# Patient Record
Sex: MALE | Race: BLACK OR AFRICAN AMERICAN | NOT HISPANIC OR LATINO | Employment: FULL TIME | URBAN - METROPOLITAN AREA
[De-identification: names, ages, dates, MRNs, and addresses within clinical notes are randomized per-mention and may not be internally consistent; named-entity substitution may affect disease eponyms.]

---

## 2018-03-26 ENCOUNTER — OFFICE VISIT (OUTPATIENT)
Dept: INTERNAL MEDICINE | Facility: CLINIC | Age: 48
End: 2018-03-26
Payer: COMMERCIAL

## 2018-03-26 VITALS
BODY MASS INDEX: 29.91 KG/M2 | TEMPERATURE: 99.1 F | SYSTOLIC BLOOD PRESSURE: 113 MMHG | WEIGHT: 168.8 LBS | OXYGEN SATURATION: 98 % | DIASTOLIC BLOOD PRESSURE: 77 MMHG | HEIGHT: 63 IN | HEART RATE: 91 BPM

## 2018-03-26 DIAGNOSIS — H53.8 BLURRED VISION: ICD-10-CM

## 2018-03-26 DIAGNOSIS — R35.0 URINARY FREQUENCY: Primary | ICD-10-CM

## 2018-03-26 DIAGNOSIS — K59.00 CONSTIPATION, UNSPECIFIED CONSTIPATION TYPE: ICD-10-CM

## 2018-03-26 DIAGNOSIS — R06.81 APNEA, TRANSIENT: ICD-10-CM

## 2018-03-26 DIAGNOSIS — Z13.1 SCREENING FOR DIABETES MELLITUS: ICD-10-CM

## 2018-03-26 DIAGNOSIS — R06.83 SNORING: ICD-10-CM

## 2018-03-26 LAB
ALBUMIN UR-MCNC: NEGATIVE MG/DL
APPEARANCE UR: CLEAR
BACTERIA #/AREA URNS HPF: ABNORMAL /HPF
BILIRUB UR QL STRIP: NEGATIVE
COLOR UR AUTO: YELLOW
GLUCOSE UR STRIP-MCNC: >=1000 MG/DL
HBA1C MFR BLD: >15 % (ref 4.3–6)
HGB UR QL STRIP: ABNORMAL
KETONES UR STRIP-MCNC: 15 MG/DL
LEUKOCYTE ESTERASE UR QL STRIP: NEGATIVE
NITRATE UR QL: NEGATIVE
PH UR STRIP: 5.5 PH (ref 5–7)
RBC #/AREA URNS AUTO: ABNORMAL /HPF
SOURCE: ABNORMAL
SP GR UR STRIP: 1.01 (ref 1–1.03)
UROBILINOGEN UR STRIP-ACNC: 0.2 EU/DL (ref 0.2–1)
WBC #/AREA URNS AUTO: ABNORMAL /HPF

## 2018-03-26 PROCEDURE — 84443 ASSAY THYROID STIM HORMONE: CPT | Performed by: INTERNAL MEDICINE

## 2018-03-26 PROCEDURE — G0103 PSA SCREENING: HCPCS | Performed by: INTERNAL MEDICINE

## 2018-03-26 PROCEDURE — 82043 UR ALBUMIN QUANTITATIVE: CPT | Performed by: INTERNAL MEDICINE

## 2018-03-26 PROCEDURE — 83036 HEMOGLOBIN GLYCOSYLATED A1C: CPT | Performed by: INTERNAL MEDICINE

## 2018-03-26 PROCEDURE — 81001 URINALYSIS AUTO W/SCOPE: CPT | Performed by: INTERNAL MEDICINE

## 2018-03-26 PROCEDURE — 36415 COLL VENOUS BLD VENIPUNCTURE: CPT | Performed by: INTERNAL MEDICINE

## 2018-03-26 PROCEDURE — 99204 OFFICE O/P NEW MOD 45 MIN: CPT | Performed by: INTERNAL MEDICINE

## 2018-03-26 PROCEDURE — 80053 COMPREHEN METABOLIC PANEL: CPT | Performed by: INTERNAL MEDICINE

## 2018-03-26 NOTE — PATIENT INSTRUCTIONS
Preventive Health Recommendations  Male Ages 40 to 49    Yearly exam:             See your health care provider every year in order to  o   Review health changes.   o   Discuss preventive care.    o   Review your medicines if your doctor has prescribed any.    You should be tested each year for STDs (sexually transmitted diseases) if you re at risk.     Have a cholesterol test every 5 years.     Have a colonoscopy (test for colon cancer) if someone in your family has had colon cancer or polyps before age 50.     After age 45, have a diabetes test (fasting glucose). If you are at risk for diabetes, you should have this test every 3 years.      Talk with your health care provider about whether or not a prostate cancer screening test (PSA) is right for you.    Shots: Get a flu shot each year. Get a tetanus shot every 10 years.     Nutrition:    Eat at least 5 servings of fruits and vegetables daily.     Eat whole-grain bread, whole-wheat pasta and brown rice instead of white grains and rice.     Talk to your provider about Calcium and Vitamin D.     Lifestyle    Exercise for at least 150 minutes a week (30 minutes a day, 5 days a week). This will help you control your weight and prevent disease.     Limit alcohol to one drink per day.     No smoking.     Wear sunscreen to prevent skin cancer.     See your dentist every six months for an exam and cleaning.

## 2018-03-26 NOTE — PROGRESS NOTES
SUBJECTIVE:   CC: Norma Arcos is an 47 year old male who presents for preventative health visit.     Healthy Habits:    Do you get at least three servings of calcium containing foods daily (dairy, green leafy vegetables, etc.)? yes    Amount of exercise or daily activities, outside of work: No.    Problems taking medications regularly No    Medication side effects: No    Have you had an eye exam in the past two years? yes    Do you see a dentist twice per year? no    Do you have sleep apnea, excessive snoring or daytime drowsiness?yes, sleep apnea.       Urinary frequency. He reports having to urinate every 30mins-1hr.      No fevers/no penile discharge/no dysuria.     Polyphagia, polydypsia, polyuria.       He has blurred vision. He has difficulty seeing and reading.     He works at a machine, . A lot of physical work.     Today's PHQ-2 Score:   PHQ-2 ( 1999 [employer]) 3/26/2018   Q1: Little interest or pleasure in doing things 0   Q2: Feeling down, depressed or hopeless 0   PHQ-2 Score 0       Abuse: Current or Past(Physical, Sexual or Emotional)- No  Do you feel safe in your environment - Yes    Social History   Substance Use Topics     Smoking status: Never Smoker     Smokeless tobacco: Never Used     Alcohol use Yes      Comment: Socially      If you drink alcohol do you typically have >3 drinks per day or >7 drinks per week? Yes - AUDIT SCORE:     No flowsheet data found.                      Last PSA: No results found for: PSA    Reviewed orders with patient. Reviewed health maintenance and updated orders accordingly - Yes  There is no problem list on file for this patient.    History reviewed. No pertinent surgical history.    Social History   Substance Use Topics     Smoking status: Never Smoker     Smokeless tobacco: Never Used     Alcohol use Yes      Comment: Socially     Family History   Problem Relation Age of Onset     Hypertension Mother      DIABETES Other            Reviewed and  "updated as needed this visit by clinical staff  Tobacco  Allergies  Med Hx  Surg Hx  Fam Hx  Soc Hx        Reviewed and updated as needed this visit by Provider            ROS:  C: +weight loss, fatigue  I: NEGATIVE for worrisome rashes, moles or lesions  E: +blurred vision  ENT: NEGATIVE for ear, mouth and throat problems  R: NEGATIVE for significant cough or SOB  CV: NEGATIVE for chest pain, palpitations or peripheral edema  GI: +constipation   male: +urinary frequency  M: NEGATIVE for significant arthralgias or myalgia  N: NEGATIVE for weakness, dizziness or paresthesias  P: NEGATIVE for changes in mood or affect    OBJECTIVE:   /77 (BP Location: Right arm, Patient Position: Sitting, Cuff Size: Adult Regular)  Pulse 91  Temp 99.1  F (37.3  C) (Oral)  Ht 5' 3.25\" (1.607 m)  Wt 168 lb 12.8 oz (76.6 kg)  SpO2 98%  BMI 29.67 kg/m2  EXAM:  GENERAL: healthy, alert and no distress  EYES: Eyes grossly normal to inspection, PERRL and conjunctivae and sclerae normal  HENT: ear canals and TM's normal, nose and mouth without ulcers or lesions  NECK: no adenopathy, no asymmetry, masses, or scars and thyroid normal to palpation  RESP: lungs clear to auscultation - no rales, rhonchi or wheezes  CV: regular rate and rhythm, normal S1 S2, no S3 or S4, no murmur, click or rub, no peripheral edema and peripheral pulses strong  ABDOMEN: soft, nontender, no hepatosplenomegaly, no masses and bowel sounds normal  MS: no gross musculoskeletal defects noted, no edema  SKIN: no suspicious lesions or rashes  NEURO: Normal strength and tone, mentation intact and speech normal  PSYCH: mentation appears normal, affect normal/bright    ASSESSMENT/PLAN:   (R35.0) Urinary frequency  (primary encounter diagnosis)  Comment:     Given his symptoms, and fam hx of DMII very suspicious of this.  Will screen A1c, kidneys, get UA.    Plan: Comprehensive metabolic panel, Albumin Random         Urine Quantitative with Creat Ratio, *UA " "reflex        to Microscopic and Culture ([place] and [facility] (except [place] and [place]), PSA,         screen, Urine Microscopic            (Z13.1) Screening for diabetes mellitus  Comment: Screen as above  Plan: Hemoglobin A1c, Albumin Random Urine         Quantitative with Creat Ratio        Update:  HgbA1c returned at >15%.  Will start patient on lantus 15 units with instructions to increase by 3 units every 4 days.  In addition, will start metformin.  I will also start asa 81mg.  Will order lipids to determine if statin needed or not.    (H53.8) Blurred vision  Comment: likely due to marked elevation of BS  Plan: OPHTHALMOLOGY ADULT REFERRAL, TSH with free T4         reflex            (K59.00) Constipation, unspecified constipation type  Comment: Likely gastoparesis from severe hyperglycemia  Plan: TSH with free T4 reflex            (R06.83) Snoring  Comment: Will refer for sleep study  Plan: SLEEP EVALUATION & MANAGEMENT REFERRAL - [facility] [place]          [phone] (Age 18 and up)            (R06.81) Apnea, transient  Comment: see above  Plan: SLEEP EVALUATION & MANAGEMENT REFERRAL - [facility] [place]          [phone] (Age 18 and up)              COUNSELING:  Reviewed preventive health counseling, as reflected in patient instructions       Regular exercise       Healthy diet/nutrition       Vision screening       reports that he has never smoked. He has never used smokeless tobacco.    Estimated body mass index is 29.67 kg/(m^2) as calculated from the following:    Height as of this encounter: 5' 3.25\" (1.607 m).    Weight as of this encounter: 168 lb 12.8 oz (76.6 kg).   Weight management plan: Discussed healthy diet and exercise guidelines and patient will follow up in 3 months in clinic to re-evaluate. Specific weight management program called low carb discussed and follow up in 3 months in clinic to " re-evaluate.    Counseling Resources:  ATP IV Guidelines  Pooled Cohorts Equation Calculator  FRAX Risk Assessment  ICSI Preventive Guidelines  Dietary Guidelines for Americans, 2010  USDA's MyPlate  ASA Prophylaxis  Lung CA Screening    Mckenzie Wood MD  [facility]

## 2018-03-26 NOTE — MR AVS SNAPSHOT
After Visit Summary   3/26/2018    Norma Arcos    MRN: [id number]           Patient Information     Date Of Birth          1970        Visit Information        Provider Department      3/26/2018 3:00 PM Mckenzie Wood MD; [facility]        Today's Diagnoses     Urinary frequency    -  1    Screening for diabetes mellitus        Blurred vision        Constipation, unspecified constipation type        Snoring        Apnea, transient          Care Instructions             Preventive Health Recommendations  Male Ages 40 to 49    Yearly exam:             See your health care provider every year in order to  o   Review health changes.   o   Discuss preventive care.    o   Review your medicines if your doctor has prescribed any.    You should be tested each year for STDs (sexually transmitted diseases) if you re at risk.     Have a cholesterol test every 5 years.     Have a colonoscopy (test for colon cancer) if someone in your family has had colon cancer or polyps before age 50.     After age 45, have a diabetes test (fasting glucose). If you are at risk for diabetes, you should have this test every 3 years.      Talk with your health care provider about whether or not a prostate cancer screening test (PSA) is right for you.    Shots: Get a flu shot each year. Get a tetanus shot every 10 years.     Nutrition:    Eat at least 5 servings of fruits and vegetables daily.     Eat whole-grain bread, whole-wheat pasta and brown rice instead of white grains and rice.     Talk to your provider about Calcium and Vitamin D.     Lifestyle    Exercise for at least 150 minutes a week (30 minutes a day, 5 days a week). This will help you control your weight and prevent disease.     Limit alcohol to one drink per day.     No smoking.     Wear sunscreen to prevent skin cancer.     See your dentist every six months for an exam and cleaning.              Follow-ups after your visit         Additional Services     OPHTHALMOLOGY ADULT REFERRAL       Your provider has referred you to: [place]: [facility] [place]  [phone]  [email or web address]    Please be aware that coverage of these services is subject to the terms and limitations of your health insurance plan.  Call member services at your health plan with any benefit or coverage questions.      Please bring the following with you to your appointment:    (1) Any X-Rays, CTs or MRIs which have been performed.  Contact the facility where they were done to arrange for  prior to your scheduled appointment.    (2) List of current medications  (3) This referral request   (4) Any documents/labs given to you for this referral            [place]ALUATION [place] MANAGEMENT [place] [place]  [phone] (Age 18 and up)       Please be aware that coverage of these services is subject to the terms and limitations of your health insurance plan.  Call member services at your health plan with any benefit or coverage questions.      Please bring the following to your appointment:    >>   List of current medications   >>   This referral request   >>   Any documents/labs given to you for this referral                      Future tests that were ordered for you today     Open Future Orders        Priority Expected Expires Ordered    SLEEP EVALUATION & MANAGEMENT REF[place] [place]  [phone] (Age 18 and up) Routine  3/26/2019 3/26/2018            Who to contact     If you have questions or need follow up information about today's clinic visit or your schedule please contact [facility] directly at [phone].  Normal or non-critical lab and imaging results will be communicated to you by MyChart, letter or phone within 4 business days after the clinic has received the results. If you do not hear from us within 7 days, please contact  "the clinic through Teramind or phone. If you have a critical or abnormal lab result, we will notify you by phone as soon as possible.  Submit refill requests through Teramind or call your pharmacy and they will forward the refill request to us. Please allow 3 business days for your refill to be completed.          Additional Information About Your Visit        TranZfinityhart Information     Teramind lets you send messages to your doctor, view your test results, renew your prescriptions, schedule appointments and more. To sign up, go to [email or web address] . Click on \"Log in\" on the left side of the screen, which will take you to the Welcome page. Then click on \"Sign up Now\" on the right side of the page.     You will be asked to enter the access code listed below, as well as some personal information. Please follow the directions to create your username and password.     Your access code is: [email or web address]  Expires: 2018  4:07 PM     Your access code will  in 90 days. If you need help or a new code, please call your [place] clinic or [phone].        Care EveryWhere ID     This is your Care EveryWhere ID. This could be used by other organizations to access your [place] medical records  [id number]        Your Vitals Were     Pulse Temperature Height Pulse Oximetry BMI (Body Mass Index)       91 99.1  F (37.3  C) (Oral) 5' 3.25\" (1.607 m) 98% 29.67 kg/m2        Blood Pressure from Last 3 Encounters:   18 113/77    Weight from Last 3 Encounters:   18 168 lb 12.8 oz (76.6 kg)              We Performed the Following     *UA reflex to Microscopic and Culture ([place] and [place] [facility] (except [place] and [place])     Albumin Random Urine Quantitative with Creat Ratio     Comprehensive metabolic panel     Hemoglobin A1c     OPHTHALMOLOGY ADULT REFERRAL     PSA, screen     TSH with free T4 reflex        Primary Care Provider    None Specified       No primary provider on file.        Equal Access " to Services     BRIDGETTE MACK : Malachi Duong, waalka luna, qajammie grewal. So [facility] [phone].    ATENCIÓN: Si habla español, tiene a mei disposición servicios gratuitos de asistencia lingüística. Llame al [phone].    We comply with applicable federal civil rights laws and [place] laws. We do not discriminate on the basis of race, color, national origin, age, disability, sex, sexual orientation, or gender identity.            Thank you!     Thank you for choosing [facility]  for your care. Our goal is always to provide you with excellent care. Hearing back from our patients is one way we can continue to improve our services. Please take a few minutes to complete the written survey that you may receive in the mail after your visit with us. Thank you!             Your Updated Medication List - Protect others around you: Learn how to safely use, store and throw away your medicines at www.disposemymeds.org.          This list is accurate as of 3/26/18  4:09 PM.  Always use your most recent med list.                   Brand Name Dispense Instructions for use Diagnosis    IBUPROFEN PO      Take 1 tablet by mouth daily as needed for moderate pain        TYLENOL PO      Take 1 tablet by mouth daily as needed for mild pain or fever

## 2018-03-26 NOTE — NURSING NOTE
"Chief Complaint   Patient presents with     Physical       Initial /77 (BP Location: Right arm, Patient Position: Sitting, Cuff Size: Adult Regular)  Pulse 91  Temp 99.1  F (37.3  C) (Oral)  Ht 5' 3.25\" (1.607 m)  Wt 168 lb 12.8 oz (76.6 kg)  SpO2 98%  BMI 29.67 kg/m2 Estimated body mass index is 29.67 kg/(m^2) as calculated from the following:    Height as of this encounter: 5' 3.25\" (1.607 m).    Weight as of this encounter: 168 lb 12.8 oz (76.6 kg).  Medication Reconciliation: complete   Sobeida Azar MA      "

## 2018-03-27 ENCOUNTER — TELEPHONE (OUTPATIENT)
Dept: INTERNAL MEDICINE | Facility: CLINIC | Age: 48
End: 2018-03-27

## 2018-03-27 ENCOUNTER — OFFICE VISIT (OUTPATIENT)
Dept: PHARMACY | Facility: CLINIC | Age: 48
End: 2018-03-27
Payer: COMMERCIAL

## 2018-03-27 DIAGNOSIS — Z79.4 TYPE 2 DIABETES MELLITUS WITHOUT COMPLICATION, WITH LONG-TERM CURRENT USE OF INSULIN (H): Primary | ICD-10-CM

## 2018-03-27 DIAGNOSIS — E11.8 TYPE 2 DIABETES MELLITUS WITH COMPLICATION, WITHOUT LONG-TERM CURRENT USE OF INSULIN (H): Primary | ICD-10-CM

## 2018-03-27 DIAGNOSIS — E11.9 TYPE 2 DIABETES MELLITUS WITHOUT COMPLICATION, WITH LONG-TERM CURRENT USE OF INSULIN (H): Primary | ICD-10-CM

## 2018-03-27 LAB
ALBUMIN SERPL-MCNC: 4.1 G/DL (ref 3.4–5)
ALP SERPL-CCNC: 74 U/L (ref 40–150)
ALT SERPL W P-5'-P-CCNC: 19 U/L (ref 0–70)
ANION GAP SERPL CALCULATED.3IONS-SCNC: 9 MMOL/L (ref 3–14)
AST SERPL W P-5'-P-CCNC: 17 U/L (ref 0–45)
BILIRUB SERPL-MCNC: 1 MG/DL (ref 0.2–1.3)
BUN SERPL-MCNC: 12 MG/DL (ref 7–30)
CALCIUM SERPL-MCNC: 9.7 MG/DL (ref 8.5–10.1)
CHLORIDE SERPL-SCNC: 101 MMOL/L (ref 94–109)
CO2 SERPL-SCNC: 26 MMOL/L (ref 20–32)
CREAT SERPL-MCNC: 0.82 MG/DL (ref 0.66–1.25)
CREAT UR-MCNC: 62 MG/DL
GFR SERPL CREATININE-BSD FRML MDRD: >90 ML/MIN/1.7M2
GLUCOSE SERPL-MCNC: 278 MG/DL (ref 70–99)
MICROALBUMIN UR-MCNC: 46 MG/L
MICROALBUMIN/CREAT UR: 73.28 MG/G CR (ref 0–17)
POTASSIUM SERPL-SCNC: 4.5 MMOL/L (ref 3.4–5.3)
PROT SERPL-MCNC: 7.9 G/DL (ref 6.8–8.8)
PSA SERPL-ACNC: 0.3 UG/L (ref 0–4)
SODIUM SERPL-SCNC: 136 MMOL/L (ref 133–144)
TSH SERPL DL<=0.005 MIU/L-ACNC: 1.75 MU/L (ref 0.4–4)

## 2018-03-27 PROCEDURE — 99605 MTMS BY PHARM NP 15 MIN: CPT | Performed by: PHARMACIST

## 2018-03-27 RX ORDER — LISINOPRIL 5 MG/1
5 TABLET ORAL DAILY
Qty: 90 TABLET | Refills: 3 | Status: SHIPPED | OUTPATIENT
Start: 2018-03-27 | End: 2018-06-18

## 2018-03-27 RX ORDER — METFORMIN HCL 500 MG
500 TABLET, EXTENDED RELEASE 24 HR ORAL
Qty: 90 TABLET | Refills: 3 | Status: SHIPPED | OUTPATIENT
Start: 2018-03-27 | End: 2018-04-03

## 2018-03-27 RX ORDER — GLUCOSAMINE HCL/CHONDROITIN SU 500-400 MG
CAPSULE ORAL
Qty: 100 EACH | Refills: 3 | Status: SHIPPED | OUTPATIENT
Start: 2018-03-27 | End: 2019-08-15

## 2018-03-27 NOTE — PATIENT INSTRUCTIONS
Recommendations from today's MTM visit:                                                    MTM (medication therapy management) is a service provided by a clinical pharmacist designed to help you get the most of out of your medicines.   Today we reviewed what your medicines are for, how to know if they are working, that your medicines are safe and how to make your medicine regimen as easy as possible.     1. Start insulin 15 units daily, same time every day    2.  Check blood sugars daily - before eating    Next MTM visit: 1 week    To schedule another MTM appointment, please call the clinic directly or you may call the MTM scheduling line at [phone] or toll-free at [phone].     My Clinical Pharmacist's contact information:                                                      It was a pleasure seeing you today!  Please feel free to contact me with any questions or concerns you have.     Kassidy Craft , Pharm D  [phone] (phone)  [phone] (pager)  Medication Therapy Management Pharmacist     You may receive a survey about the MTM services you received.  I would appreciate your feedback to help me serve you better in the future. Please fill it out and return it when you can. Your comments will be anonymous.      My healthcare goals:                                                      Healthcare Goals for Patients with Diabetes:     Home Monitoring of Blood Sugar:  Morning Pre-meal: 80-130mg/dl  2-Hours After a Meal: 80-150mg/dl     Blood Pressure: Less than 130/80 mm/hg     Hemoglobin A1C: Less than 7.0%      This is a 3 month average of your blood sugars. We should do this test every 3 to 6 months depending on your diabetes control.      Goals for Cholesterol Levels:  Total Cholesterol: Less than 200 mg/dl  Triglycerides: Less than 150 mg/dl  LDL: Less than 100mg/dl (less than 70 mg/dl with heart disease)  HDL: Above 40 mg/dl (or as high as we can)      We should check your cholesterol and liver  function at least every year or 3 months after a change in dose or medication.     By being more aware of your goals for your healthcare, you can take a more active role in managing your medications and lifestyle changes. We all need to work together to help you get the best healthcare.

## 2018-03-27 NOTE — TELEPHONE ENCOUNTER
Patient called clinic stating he was told to call us but there is a language barrier and he wants a call back using a Serbian  to review lab results.  There is no result note nor phone message regarding lab results.  Please review and advise.  JULIA Keating R.N.

## 2018-03-27 NOTE — TELEPHONE ENCOUNTER
Called patient with  to discuss lab results    His HgbA1c is markedly elevated >15%    I would like to do the followin) Start at lantus 15 U nightly, titrate up 3 units every 4 days to goal AM fasting BG of <150  2) Start metformin 500mg 24 hr with dinner  3) Start baby aspirin  4) Have patient return for fasting lipids to determine if we need to start statin. Order placed  5) BP okay, but evidence of microalbuminuria. Will also send for lisinopril 5mg daily    All meds sent to [facility] pharmacy    Thank you!    Mckenzie Wood MD

## 2018-03-27 NOTE — PROGRESS NOTES
SUBJECTIVE/OBJECTIVE:                           Norma Arcos is a 47 year old male coming in for an initial visit for Medication Therapy Management.  He was referred to me from Dr. Wood.   His wife and an  joined us today.    Chief Complaint: New diagnosis - insulin start and meter teach    Allergies/ADRs: Reviewed in Epic  Tobacco: No tobacco use  Alcohol: Less than 1 beverages / week    Medication Adherence/Access:  Just starting medications today - has not been on them before       Diabetes:  Pt currently taking nothing - prescribed Lantus and metformin today.  Reviewed medications and provided education today.  Does not like needles.   SMBG: BG meter taught today.     Microalbumin is not < 30 mg/g. Pt is taking an ACEi/ARB - prescribed lisinopril today.  Aspirin: Not taking - advise to start today  Diet/Exercise:       Current labs include:  BP Readings from Last 3 Encounters:   03/26/18 113/77     Lab Results   Component Value Date    A1C >15.0 03/26/2018     Liver Function Studies -   Recent Labs   Lab Test  03/26/18   1621   PROTTOTAL  7.9   ALBUMIN  4.1   BILITOTAL  1.0   ALKPHOS  74   AST  17   ALT  19       Lab Results   Component Value Date    UCRR 62 03/26/2018    MICROL 46 03/26/2018    UMALCR 73.28 (H) 03/26/2018       Last Basic Metabolic Panel:  Lab Results   Component Value Date     03/26/2018      Lab Results   Component Value Date    POTASSIUM 4.5 03/26/2018     Lab Results   Component Value Date    CHLORIDE 101 03/26/2018     Lab Results   Component Value Date    BUN 12 03/26/2018     Lab Results   Component Value Date    CR 0.82 03/26/2018     GFR Estimate   Date Value Ref Range Status   03/26/2018 >90 >60 mL/min/1.7m2 Final     Comment:     Non  GFR Calc     GFR Estimate If Black   Date Value Ref Range Status   03/26/2018 >90 >60 mL/min/1.7m2 Final     Comment:      GFR Calc     TSH   Date Value Ref Range Status   03/26/2018 1.75 0.40 - 4.00  mU/L Final       ASSESSMENT:                             Current medications were reviewed today.     Medication Adherence: just starting meds today     Diabetes: initial. Patient is not meeting A1c goal of < 7%. Provided patient with a new meter.  He will start metformin, ASA, lisinopril  and Lantus as prescribed.  Meter teaching and insulin administration taught today.    Provided education on hypoglycemia today.    PLAN:                            1. Check blood sugars daily - before eating    2. Start insulin 15 units daily, same time every day    I spent 60 minutes with this patient today. All changes were made via collaborative practice agreement with Mckenzie Wood. A copy of the visit note was provided to the patient's primary care provider.    Will follow up in 1 week.    The patient was given a summary of these recommendations as an after visit summary.     Kassidy Craft , Pharm D  [phone] (phone)  [phone] (pager)  Medication Therapy Management Pharmacist

## 2018-03-27 NOTE — TELEPHONE ENCOUNTER
Pharmacy received order for Lantus pens but no script for the needles, does patient have at home or can we please get an order for pen needles so can bill to patients insurance? Thanks!     Adriane Christianson, PharmD    [facility]  Phone:  [phone]  Fax:  [phone]    [facility]  Phone:  [phone]  Fax:  [phone]

## 2018-03-27 NOTE — MR AVS SNAPSHOT
After Visit Summary   3/27/2018    Norma Arcos    MRN: [id number]           Patient Information     Date Of Birth          1970        Visit Information        Provider Department      3/27/2018 3:00 PM Kassidy Craft, [facility]; [facility] MTM        Today's Diagnoses     Type 2 diabetes mellitus without complication, with long-term current use of insulin (H)    -  1      Care Instructions    Recommendations from today's MTM visit:                                                    MTM (medication therapy management) is a service provided by a clinical pharmacist designed to help you get the most of out of your medicines.   Today we reviewed what your medicines are for, how to know if they are working, that your medicines are safe and how to make your medicine regimen as easy as possible.     1. Start insulin 15 units daily, same time every day    2.  Check blood sugars daily - before eating      Next MTM visit: 1 week    To schedule another MTM appointment, please call the clinic directly or you may call the MTM scheduling line at [phone] or toll-free at [phone].     My Clinical Pharmacist's contact information:                                                      It was a pleasure seeing you today!  Please feel free to contact me with any questions or concerns you have.     Kassidy Craft , Pharm D  [phone] (phone)  [phone] (pager)  Medication Therapy Management Pharmacist     You may receive a survey about the MTM services you received.  I would appreciate your feedback to help me serve you better in the future. Please fill it out and return it when you can. Your comments will be anonymous.      My healthcare goals:                                                      Healthcare Goals for Patients with Diabetes:     Home Monitoring of Blood Sugar:  Morning Pre-meal: 80-130mg/dl  2-Hours After a Meal: 80-150mg/dl     Blood Pressure: Less than 130/80  mm/hg     Hemoglobin A1C: Less than 7.0%      This is a 3 month average of your blood sugars. We should do this test every 3 to 6 months depending on your diabetes control.      Goals for Cholesterol Levels:  Total Cholesterol: Less than 200 mg/dl  Triglycerides: Less than 150 mg/dl  LDL: Less than 100mg/dl (less than 70 mg/dl with heart disease)  HDL: Above 40 mg/dl (or as high as we can)      We should check your cholesterol and liver function at least every year or 3 months after a change in dose or medication.     By being more aware of your goals for your healthcare, you can take a more active role in managing your medications and lifestyle changes. We all need to work together to help you get the best healthcare.                  Follow-ups after your visit        Your next 10 appointments already scheduled     Apr 03, 2018  3:00 PM CDT   [facility]    [address]  [address]  [facility] [address] [phone]           Bring a current list of meds and any records pertaining to this visit. For Physicals, please bring immunization records and any forms needing to be filled out. Please arrive 10 minutes early to complete paperwork.              Future tests that were ordered for you today     Open Future Orders        Priority Expected Expires Ordered    Lipid panel reflex to direct LDL Fasting Routine  3/27/2019 3/27/2018    SLEEP EVALUATION & MANAGEMENT REFERRAL - ADULT -[facility]  [phone] (Age 18 and up) Routine  3/26/2019 3/26/2018            Who to contact     If you have questions or need follow up information about today's clinic visit or your schedule please contact [facility] directly at [phone].  Normal or non-critical lab and imaging results will be communicated to you by MyChart, letter or phone within 4 business days after the clinic has received the  "results. If you do not hear from us within 7 days, please contact the clinic through [employer] or phone. If you have a critical or abnormal lab result, we will notify you by phone as soon as possible.  Submit refill requests through [employer] or call your pharmacy and they will forward the refill request to us. Please allow 3 business days for your refill to be completed.          Additional Information About Your Visit        [employer] Information     [employer] lets you send messages to your doctor, view your test results, renew your prescriptions, schedule appointments and more. To sign up, go to [email or web address] . Click on \"Log in\" on the left side of the screen, which will take you to the Welcome page. Then click on \"Sign up Now\" on the right side of the page.     You will be asked to enter the access code listed below, as well as some personal information. Please follow the directions to create your username and password.     Your access code is: [email or web address]  Expires: 2018  4:07 PM     Your access code will  in 90 days. If you need help or a new code, please call your [place] clinic or [phone].        Care EveryWhere ID     This is your Care EveryWhere ID. This could be used by other organizations to access your [place] medical records  [id number]         Blood Pressure from Last 3 Encounters:   18 113/77    Weight from Last 3 Encounters:   18 168 lb 12.8 oz (76.6 kg)              Today, you had the following     No orders found for display         Today's Medication Changes          These changes are accurate as of 3/27/18  4:31 PM.  If you have any questions, ask your nurse or doctor.               Start taking these medicines.        Dose/Directions    alcohol swab prep pads   Used for:  Type 2 diabetes mellitus with complication, without long-term current use of insulin (H)   Started by:  Mckenzie Wood MD        Use to swab area of injection/shelby as directed.   Quantity:  " 100 each   Refills:  3       aspirin 81 MG tablet   Used for:  Type 2 diabetes mellitus with complication, without long-term current use of insulin (H)   Started by:  Mckenzie Wood MD        Dose:  81 mg   Take 1 tablet (81 mg) by mouth daily   Quantity:  90 tablet   Refills:  1       insulin glargine 100 UNIT/ML injection   Commonly known as:  LANTUS SOLOSTAR   Used for:  Type 2 diabetes mellitus with complication, without long-term current use of insulin (H)   Started by:  Mckenzie Wood MD        Dose:  15 Units   Inject 15 Units Subcutaneous At Bedtime   Quantity:  30 mL   Refills:  1       * insulin pen needle 32G X 4 MM   Commonly known as:  ULTICARE MICRO   Used for:  Type 2 diabetes mellitus with complication, without long-term current use of insulin (H)   Started by:  Mckenzie Wood MD        Use  pen needles daily or as directed.   Quantity:  100 each   Refills:  3       * insulin pen needle 31G X 6 MM   Commonly known as:  ULTICARE MINI   Used for:  Type 2 diabetes mellitus with complication, without long-term current use of insulin (H)   Started by:  Mckenzie Wood MD        Use  pen needles daily or as directed.   Quantity:  100 each   Refills:  3       * insulin pen needle 31G X 8 MM   Commonly known as:  ULTICARE SHORT   Used for:  Type 2 diabetes mellitus with complication, without long-term current use of insulin (H)   Started by:  Mckenzie Wood MD        Use pen needles daily or as directed.   Quantity:  100 each   Refills:  3       * insulin pen needle 29G X 12.7MM   Commonly known as:  ULTICARE   Used for:  Type 2 diabetes mellitus with complication, without long-term current use of insulin (H)   Started by:  Mckenzie Wood MD        Use pen needles daily or as directed.   Quantity:  100 each   Refills:  3       lisinopril 5 MG tablet   Commonly known as:  PRINIVIL/ZESTRIL   Used for:  Type 2 diabetes mellitus with complication, without long-term current use of insulin (H)   Started by:  [place]  MD Mckenzie        Dose:  5 mg   Take 1 tablet (5 mg) by mouth daily   Quantity:  90 tablet   Refills:  3       metFORMIN 500 MG 24 hr tablet   Commonly known as:  GLUCOPHAGE-XR   Used for:  Type 2 diabetes mellitus with complication, without long-term current use of insulin (H)   Started by:  Mckenzie Wood MD        Dose:  500 mg   Take 1 tablet (500 mg) by mouth daily (with dinner)   Quantity:  90 tablet   Refills:  3       * Notice:  This list has 4 medication(s) that are the same as other medications prescribed for you. Read the directions carefully, and ask your doctor or other care provider to review them with you.         Where to get your medicines      These medications were sent to [facility] [place], [place] [facility] [address] [facility] [address] [address]     Phone:  [phone]     alcohol swab prep pads    aspirin 81 MG tablet    insulin glargine 100 UNIT/ML injection    insulin pen needle 29G X 12.7MM    insulin pen needle 31G X 6 MM    insulin pen needle 31G X 8 MM    insulin pen needle 32G X 4 MM    lisinopril 5 MG tablet    metFORMIN 500 MG 24 hr tablet                Primary Care Provider Office Phone # Fax #    Mckenzie Wood [phone]       [address]  [facility] [address]        Equal Access to Services     BRIDGETTE MACK AH: Hadii cecily ku hadasho Soomaali, waaxda luqadaha, qaybta kaalmada adeegyada, waxay idiin hayaan flora khawais purcell. So [facility] [phone].    ATENCIÓN: Si habla español, tiene a mei disposición servicios gratuitos de asistencia lingüística. Llame al [phone].    We comply with applicable federal civil rights laws and [place] laws. We do not discriminate on the basis of race, color, national origin, age, disability, sex, sexual orientation, or gender identity.            Thank you!     Thank you for choosing [facility] [place]  for your care. Our goal is always to provide you with excellent care. Hearing back  from our patients is one way we can continue to improve our services. Please take a few minutes to complete the written survey that you may receive in the mail after your visit with us. Thank you!             Your Updated Medication List - Protect others around you: Learn how to safely use, store and throw away your medicines at www.disposemymeds.org.          This list is accurate as of 3/27/18  4:31 PM.  Always use your most recent med list.                   Brand Name Dispense Instructions for use Diagnosis    alcohol swab prep pads     100 each    Use to swab area of injection/shelby as directed.    Type 2 diabetes mellitus with complication, without long-term current use of insulin (H)       aspirin 81 MG tablet     90 tablet    Take 1 tablet (81 mg) by mouth daily    Type 2 diabetes mellitus with complication, without long-term current use of insulin (H)       IBUPROFEN PO      Take 1 tablet by mouth daily as needed for moderate pain        insulin glargine 100 UNIT/ML injection    LANTUS SOLOSTAR    30 mL    Inject 15 Units Subcutaneous At Bedtime    Type 2 diabetes mellitus with complication, without long-term current use of insulin (H)       * insulin pen needle 32G X 4 MM    ULTICARE MICRO    100 each    Use  pen needles daily or as directed.    Type 2 diabetes mellitus with complication, without long-term current use of insulin (H)       * insulin pen needle 31G X 6 MM    ULTICARE MINI    100 each    Use  pen needles daily or as directed.    Type 2 diabetes mellitus with complication, without long-term current use of insulin (H)       * insulin pen needle 31G X 8 MM    ULTICARE SHORT    100 each    Use pen needles daily or as directed.    Type 2 diabetes mellitus with complication, without long-term current use of insulin (H)       * insulin pen needle 29G X 12.7MM    ULTICARE    100 each    Use pen needles daily or as directed.    Type 2 diabetes mellitus with complication, without long-term current use  of insulin (H)       lisinopril 5 MG tablet    PRINIVIL/ZESTRIL    90 tablet    Take 1 tablet (5 mg) by mouth daily    Type 2 diabetes mellitus with complication, without long-term current use of insulin (H)       metFORMIN 500 MG 24 hr tablet    GLUCOPHAGE-XR    90 tablet    Take 1 tablet (500 mg) by mouth daily (with dinner)    Type 2 diabetes mellitus with complication, without long-term current use of insulin (H)       TYLENOL PO      Take 1 tablet by mouth daily as needed for mild pain or fever        * Notice:  This list has 4 medication(s) that are the same as other medications prescribed for you. Read the directions carefully, and ask your doctor or other care provider to review them with you.

## 2018-04-03 ENCOUNTER — OFFICE VISIT (OUTPATIENT)
Dept: PHARMACY | Facility: CLINIC | Age: 48
End: 2018-04-03
Payer: COMMERCIAL

## 2018-04-03 VITALS — SYSTOLIC BLOOD PRESSURE: 101 MMHG | DIASTOLIC BLOOD PRESSURE: 66 MMHG | HEART RATE: 78 BPM

## 2018-04-03 DIAGNOSIS — Z79.4 TYPE 2 DIABETES MELLITUS WITHOUT COMPLICATION, WITH LONG-TERM CURRENT USE OF INSULIN (H): Primary | ICD-10-CM

## 2018-04-03 DIAGNOSIS — R80.9 MICROALBUMINURIA: ICD-10-CM

## 2018-04-03 DIAGNOSIS — E11.9 TYPE 2 DIABETES MELLITUS WITHOUT COMPLICATION, WITH LONG-TERM CURRENT USE OF INSULIN (H): Primary | ICD-10-CM

## 2018-04-03 PROCEDURE — 99606 MTMS BY PHARM EST 15 MIN: CPT | Performed by: PHARMACIST

## 2018-04-03 PROCEDURE — 99607 MTMS BY PHARM ADDL 15 MIN: CPT | Performed by: PHARMACIST

## 2018-04-03 RX ORDER — BLOOD-GLUCOSE METER
1 EACH MISCELLANEOUS 2 TIMES DAILY
Qty: 1 KIT | Refills: 0 | COMMUNITY
Start: 2018-04-03 | End: 2019-08-15

## 2018-04-03 RX ORDER — METFORMIN HCL 500 MG
1000 TABLET, EXTENDED RELEASE 24 HR ORAL
Qty: 60 TABLET | Refills: 3 | Status: SHIPPED | OUTPATIENT
Start: 2018-04-03 | End: 2018-04-18

## 2018-04-03 NOTE — Clinical Note
Andrae Wood,  Please see note as FYI. Increased metformin this encounter, patient to test blood sugars and will return in 2 weeks for insulin adjustment.   Devora Castaneda, PharmD Pharmaceutical Care Resident  Pager: [phone]

## 2018-04-03 NOTE — MR AVS SNAPSHOT
After Visit Summary   4/3/2018    Norma Arcos    MRN: [id number]           Patient Information     Date Of Birth          1970        Visit Information        Provider Department      4/3/2018 2:45 PM Kassidy Craft, [facility]; MULTILINGUAL WORD [facility]        Today's Diagnoses     Type 2 diabetes mellitus without complication, with long-term current use of insulin (H)    -  1    Type 2 diabetes mellitus with complication, without long-term current use of insulin (H)          Care Instructions    Recommendations from today's MTM visit:                                                      1. Schedule appointment with diabetes education for diet education    2. Check blood sugars once daily - before eating.  Prescription for test strips and lancets sent to the pharmacy today.    3. Increase metformin to 2 tablets daily - okay to take both tablets at the same time      Next MTM visit: 2 weeks    To schedule another MTM appointment, please call the clinic directly or you may call the MTM scheduling line at [phone] or toll-free at [phone].     My Clinical Pharmacist's contact information:                                                      It was a pleasure seeing you today!  Please feel free to contact me with any questions or concerns you have.      Devora Castaneda, PharmD  Pharmaceutical Care Resident   Pager: [phone]      You may receive a survey about the MT services you received.  I would appreciate your feedback to help me serve you better in the future. Please fill it out and return it when you can. Your comments will be anonymous.                    Follow-ups after your visit        Additional Services     DIABETES EDUCATOR REFERRAL       DIABETES SELF MANAGEMENT TRAINING (DSMT)      Your provider has referred you to Diabetes Education: FMG: Diabetes Education - All [facility] [phone]   [email or web address]      If an urgent visit is needed or A1C is above 12, Care Team to call the Diabetes  Education Team at [phone] or send an In Basket message to the Diabetes Education Pool (P DIAB ED-PATIENT CARE).    A  will call you to make your appointment. If it has been more than 3 business days since your referral was placed, please call the above phone number to schedule.    Type of training and number of hours: New Diagnosis: Initial group DSMT - 10 hours.      Medicare covers: 10 hours of initial DSMT in 12 month period from the time of first visit, plus 2 hours of follow-up DSMT annually, and additional hours as requested for insulin training.    Diabetes Type: Type 2 - On Insulin   Medicare covers: 10 hours of initial DSMT in 12 month period from the time of first visit, plus 2 hours of follow-up DSMT annually, and additional hours as requested for insulin training.         Diabetes Co-Morbidities: none               A1C Goal:  <7.0       A1C is: Lab Results       Component                Value               Date                       A1C                      >15.0               03/26/2018              MEDICAL NUTRITION THERAPY (MNT) for Diabetes    Medical Nutrition Therapy with a Registered Dietitian can be provided in coordination with Diabetes Self-Management Training to assist in achieving optimal diabetes management.    MNT Type and Hours: New diagnosis: Initial MNT - 3 hours                       Medicare will cover: 3 hours initial MNT in 12 month period after first visit, plus 2 hours of follow-up MNT annually             Diabetes Co-Morbidities: none               A1C Goal:  <7.0       A1C is: Lab Results       Component                Value               Date                       A1C                      >15.0               03/26/2018              Diabetes Education Topics: Comprehensive Knowledge Assessment and Instruction and Knowledge: Healthy Eating, Being Active and Monitoring Blood  Sugar    Special Educational Needs Requiring Individual DSMT: Other: Pashto  needed       MEDICAL NUTRITION THERAPY (MNT) for Diabetes    Medical Nutrition Therapy with a Registered Dietitian can be provided in coordination with Diabetes Self-Management Training to assist in achieving optimal diabetes management.    MNT Type and Hours: New diagnosis: Initial MNT - 3 hours                       Medicare will cover: 3 hours initial MNT in 12 month period after first visit, plus 2 hours of follow-up MNT annually    Please be aware that coverage of these services is subject to the terms and limitations of your health insurance plan.  Call member services at your health plan to determine Diabetes Self-Management Training (Codes  &amp; ) and Medical Nutrition Therapy (Codes 56441 & 85280) benefits and ask which blood glucose monitor brands are covered by your plan.  Please bring the following with you to your appointment:    (1)  List of current medications   (2)  List of Blood Glucose Monitor brands that are covered by your insurance plan  (3)  Blood Glucose Monitor and log book  (4)   Food records for the 3 days prior to your visit    The Certified Diabetes Educator may make diabetes medication adjustments per the CDE Protocol and Collaborative Practice Agreement.                  Your next 10 appointments already scheduled     Apr 17, 2018 10:00 AM CDT   Office Visit with Kassidy Craft [facility] [place] ([facility]) [address]  [address]  [facility] [address] [phone]           Bring a current list of meds and any records pertaining to this visit. For Physicals, please bring immunization records and any forms needing to be filled out. Please arrive 10 minutes early to complete paperwork.              Who to contact     If you have questions or need follow up information about today's clinic visit or your schedule please contact [place]  "[facility] directly at [phone].  Normal or non-critical lab and imaging results will be communicated to you by MyChart, letter or phone within 4 business days after the clinic has received the results. If you do not hear from us within 7 days, please contact the clinic through [employer] or phone. If you have a critical or abnormal lab result, we will notify you by phone as soon as possible.  Submit refill requests through Moment or call your pharmacy and they will forward the refill request to us. Please allow 3 business days for your refill to be completed.          Additional Information About Your Visit        [employer] Information     Moment lets you send messages to your doctor, view your test results, renew your prescriptions, schedule appointments and more. To sign up, go to [email or web address] . Click on \"Log in\" on the left side of the screen, which will take you to the Welcome page. Then click on \"Sign up Now\" on the right side of the page.     You will be asked to enter the access code listed below, as well as some personal information. Please follow the directions to create your username and password.     Your access code is: [email or web address]  Expires: 2018  4:07 PM     Your access code will  in 90 days. If you need help or a new code, please call your [place] clinic or [phone].        Care EveryWhere ID     This is your Care EveryWhere ID. This could be used by other organizations to access your [place] medical records  [id number]        Your Vitals Were     Pulse                   78            Blood Pressure from Last 3 Encounters:   18 101/66   18 113/77    Weight from Last 3 Encounters:   18 168 lb 12.8 oz (76.6 kg)              We Performed the Following     DIABETES EDUCATOR REFERRAL          Today's Medication Changes          These changes are accurate as of 4/3/18  3:54 PM.  If you have any questions, ask your nurse or doctor.               Start taking " these medicines.        Dose/Directions    blood glucose monitoring lancets   Used for:  Type 2 diabetes mellitus without complication, with long-term current use of insulin (H)   Started by:  Kassidy Craft RPH        Use to test blood sugar 2 times daily or as directed.  Ok to substitute alternative if insurance prefers.   Quantity:  100 each   Refills:  11       blood glucose monitoring test strip   Commonly known as:  NATHANIEL CONTOUR NEXT   Used for:  Type 2 diabetes mellitus without complication, with long-term current use of insulin (H)   Started by:  Kassidy Craft RPH        Use to test blood sugar 2 times daily or as directed.  Ok to substitute alternative if insurance prefers.   Quantity:  100 strip   Refills:  prn         These medicines have changed or have updated prescriptions.        Dose/Directions    metFORMIN 500 MG 24 hr tablet   Commonly known as:  GLUCOPHAGE-XR   This may have changed:  how much to take   Used for:  Type 2 diabetes mellitus with complication, without long-term current use of insulin (H)   Changed by:  Kassidy Craft RPH        Dose:  1000 mg   Take 2 tablets (1,000 mg) by mouth daily (with dinner)   Quantity:  60 tablet   Refills:  3            Where to get your medicines      These medications [facility] [place] [address] [facility] [address] [id number]     Phone:  [phone]     blood glucose monitoring lancets    blood glucose monitoring test strip    metFORMIN 500 MG 24 hr tablet                Primary Care Provider Office Phone # Fax #    Mckenzie Wood [phone]       [address]  [facility] [address]        Equal Access to Services     [facility] AH: Hadii cecily agueroo Sosilver, waaxda luqadaha, qaybta kaalmada adeegyada, jammie purcell. [facility] [phone].    ATENCIÓN: Si habla español, tiene a mei disposición servicios gratuitos de asistencia  lingüística. Xander al [phone].    We comply with applicable federal civil rights laws and [place] laws. We do not discriminate on the basis of race, color, national origin, age, disability, sex, sexual orientation, or gender identity.            Thank you!     Thank you for choosing [facility]  for your care. Our goal is always to provide you with excellent care. Hearing back from our patients is one way we can continue to improve our services. Please take a few minutes to complete the written survey that you may receive in the mail after your visit with us. Thank you!             Your Updated Medication List - Protect others around you: Learn how to safely use, store and throw away your medicines at www.disposemymeds.org.          This list is accurate as of 4/3/18  3:54 PM.  Always use your most recent med list.                   Brand Name Dispense Instructions for use Diagnosis    alcohol swab prep pads     100 each    Use to swab area of injection/shelby as directed.    Type 2 diabetes mellitus with complication, without long-term current use of insulin (H)       aspirin 81 MG tablet     90 tablet    Take 1 tablet (81 mg) by mouth daily    Type 2 diabetes mellitus with complication, without long-term current use of insulin (H)       blood glucose monitoring lancets     100 each    Use to test blood sugar 2 times daily or as directed.  Ok to substitute alternative if insurance prefers.    Type 2 diabetes mellitus without complication, with long-term current use of insulin (H)       blood glucose monitoring test strip    NATHANIEL CONTOUR NEXT    100 strip    Use to test blood sugar 2 times daily or as directed.  Ok to substitute alternative if insurance prefers.    Type 2 diabetes mellitus without complication, with long-term current use of insulin (H)       IBUPROFEN PO      Take 1 tablet by mouth daily as needed for moderate pain        insulin glargine 100 UNIT/ML injection    LANTUS SOLOSTAR    30  mL    Inject 15 Units Subcutaneous At Bedtime    Type 2 diabetes mellitus with complication, without long-term current use of insulin (H)       insulin pen needle 32G X 4 MM    ULTICARE MICRO    100 each    Use  pen needles daily or as directed.    Type 2 diabetes mellitus with complication, without long-term current use of insulin (H)       lisinopril 5 MG tablet    PRINIVIL/ZESTRIL    90 tablet    Take 1 tablet (5 mg) by mouth daily    Type 2 diabetes mellitus with complication, without long-term current use of insulin (H)       metFORMIN 500 MG 24 hr tablet    GLUCOPHAGE-XR    60 tablet    Take 2 tablets (1,000 mg) by mouth daily (with dinner)    Type 2 diabetes mellitus with complication, without long-term current use of insulin (H)       TYLENOL PO      Take 1 tablet by mouth daily as needed for mild pain or fever

## 2018-04-03 NOTE — PROGRESS NOTES
SUBJECTIVE/OBJECTIVE:                Norma Arcos is a 47 year old male coming in for a follow-up visit for Medication Therapy Management.  He was referred to me from Dr. Cleveland. Using phone .     Chief Complaint: Follow up from our visit on 3/27/18.  Started insulin last week    Tobacco: No tobacco use  Alcohol: not currently using    Medication Adherence/Access:  no issues reported    Diabetes:  Pt currently taking Lantus 15 units daily and metformin  mg once daily. Pt is not experiencing side effects.  SMBG: tried it once since last week.   Ranges 473 at 6pm; blood sugar at 330 pm today (ate at 240 today) was 383.  Pt not able/willing to check on his own, has his wife help him at home.  Patient is experiencing hypoglycemia - a little bit shaky if he doesn't eat on time  Recent symptoms of high blood sugar?  Reports taste of urine as changed since starting insulin; prior to starting insulin it was sugary.  Eye exam: due -apt April 10th  Foot exam: due  Microalbumin is not < 30 mg/g. Pt is taking an ACEi/ARB.  Aspirin: Taking 81mg daily and denies side effects  Diet/Exercise: very active at work; has not been seen by CDE for diet education    Hypertension: Current medications include lisinopril 5 mg daily - started for microalbuminuria.  Patient does not self-monitor BP.  Patient reports no current medication side effects.      Current labs include:  Today's Vitals: /66  Pulse 78  BP Readings from Last 3 Encounters:   04/03/18 101/66   03/26/18 113/77     Lab Results   Component Value Date    A1C >15.0 03/26/2018   .    Liver Function Studies -   Recent Labs   Lab Test  03/26/18   1621   PROTTOTAL  7.9   ALBUMIN  4.1   BILITOTAL  1.0   ALKPHOS  74   AST  17   ALT  19       Lab Results   Component Value Date    UCRR 62 03/26/2018    MICROL 46 03/26/2018    UMALCR 73.28 (H) 03/26/2018       Last Basic Metabolic Panel:  Lab Results   Component Value Date     03/26/2018      Lab Results    Component Value Date    POTASSIUM 4.5 03/26/2018     Lab Results   Component Value Date    CHLORIDE 101 03/26/2018     Lab Results   Component Value Date    BUN 12 03/26/2018     Lab Results   Component Value Date    CR 0.82 03/26/2018     GFR Estimate   Date Value Ref Range Status   03/26/2018 >90 >60 mL/min/1.7m2 Final     Comment:     Non  GFR Calc     GFR Estimate If Black   Date Value Ref Range Status   03/26/2018 >90 >60 mL/min/1.7m2 Final     Comment:      GFR Calc     TSH   Date Value Ref Range Status   03/26/2018 1.75 0.40 - 4.00 mU/L Final     ASSESSMENT:              Current medications were reviewed today as discussed above.      Medication Adherence: excellent, no issues identified    Diabetes:  Needs improvement.  A1c not at goal < 7%.  Pt recommended to check blood sugars once daily at home.  Increase metformin  mg tablets to 2 tablets daily with meals.  Did not want to adjust insulin without any home readings.  Referred to CDE for diet education. Pt scheduled for eye exam.    Hypertension: stable.  Blood pressure at goal <140/90 mmHg.      PLAN:                  1. Schedule appointment with diabetes education for diet education    2. Check blood sugars once daily - before eating    3. Increase metformin  mg tablets to 2 tablets daily - okay to take both tablets at the same time.     I spent 50 minutes with this patient today. All changes were made via collaborative practice agreement with Mckenzie Wood. A copy of the visit note was provided to the patient's primary care provider.     Will follow up in 2 weeks.    The patient was given a summary of these recommendations as an after visit summary.    Kassidy Craft , Pharm D  [phone] (phone)  [phone] (pager)  Medication Therapy Management Pharmacist     Devora Castaneda, PharmD  Pharmaceutical Care Resident   Pager: [phone]

## 2018-04-17 ENCOUNTER — OFFICE VISIT (OUTPATIENT)
Dept: PHARMACY | Facility: CLINIC | Age: 48
End: 2018-04-17
Payer: COMMERCIAL

## 2018-04-17 VITALS
WEIGHT: 174.4 LBS | OXYGEN SATURATION: 99 % | DIASTOLIC BLOOD PRESSURE: 70 MMHG | BODY MASS INDEX: 30.65 KG/M2 | HEART RATE: 79 BPM | SYSTOLIC BLOOD PRESSURE: 103 MMHG

## 2018-04-17 DIAGNOSIS — Z79.4 TYPE 2 DIABETES MELLITUS WITHOUT COMPLICATION, WITH LONG-TERM CURRENT USE OF INSULIN (H): Primary | ICD-10-CM

## 2018-04-17 DIAGNOSIS — R07.9 CHEST PAIN, UNSPECIFIED TYPE: ICD-10-CM

## 2018-04-17 DIAGNOSIS — E11.9 TYPE 2 DIABETES MELLITUS WITHOUT COMPLICATION, WITH LONG-TERM CURRENT USE OF INSULIN (H): Primary | ICD-10-CM

## 2018-04-17 PROCEDURE — 99607 MTMS BY PHARM ADDL 15 MIN: CPT | Performed by: PHARMACIST

## 2018-04-17 PROCEDURE — 99606 MTMS BY PHARM EST 15 MIN: CPT | Performed by: PHARMACIST

## 2018-04-17 NOTE — MR AVS SNAPSHOT
After Visit Summary   4/17/2018    Norma Arcos    MRN: [id number]           Patient Information     Date Of Birth          1970        Visit Information        Provider Department      4/17/2018 3:15 PM Kassidy Craft [facility]; MULTILINGUAL WORD [facility]        Care Instructions    Recommendations from today's MTM visit:                                                      1. Will talk to Dr. Wood about increasing metformin XL to 1500 mg (3 tablets) once daily    2. Take blood sugar at least 3 times weekly in the morning fasting before breakfast.     3. If having pains, such as chest pain, should go to emergency room.     4. Schedule another appointment with Dr. Wood.     5. MTM pharmacist will follow-up so you will get a call from Diabetes Education.     Next MTM visit: 1 month     To schedule another MTM appointment, please call the clinic directly or you may call the MTM scheduling line at [phone] or toll-free at [phone].     My Clinical Pharmacist's contact information:                                                      It was a pleasure seeing you today!  Please feel free to contact me with any questions or concerns you have.      Devora Castaneda, PharmD  Pharmaceutical Care Resident   Pager: [phone]      You may receive a survey about the MTM services you received.  I would appreciate your feedback to help me serve you better in the future. Please fill it out and return it when you can. Your comments will be anonymous.                    Follow-ups after your visit        Your next 10 appointments already scheduled     May 01, 2018  4:00 PM CDT   SHORT with Mckenzie Wood MD   [facility] ([facility])    [facility] [address]  [facility] [address]   [phone]            May 29, 2018  2:00 PM CDT   SHORT with Kassidy Craft RPH   [facility] ([address]  [facility]  [address]  [facility] [id number] [phone]              Who to contact     If you have questions or need follow up information about today's clinic visit or your schedule please contact [facility] directly at [phone].  Normal or non-critical lab and imaging results will be communicated to you by MyChart, letter or phone within 4 business days after the clinic has received the results. If you do not hear from us within 7 days, please contact the clinic through MyChart or phone. If you have a critical or abnormal lab result, we will notify you by phone as soon as possible.  Submit refill requests through [employer]Yoga or call your pharmacy and they will forward the refill request to us. Please allow 3 business days for your refill to be completed.          Additional Information About Your Visit        MyCharID90T Information     YYoga lets you send messages to your doctor, view your test results, renew your prescriptions, schedule appointments and more. To sign up, go to [email or web address] . Click on \"Log in\" on the left side of the screen, which will take you to the Welcome page. Then click on \"Sign up Now\" on the right side of the page.     You will be asked to enter the access code listed below, as well as some personal information. Please follow the directions to create your username and password.     Your access code is: [email or web address]  Expires: 2018  4:07 PM     Your access code will  in 90 days. If you need help or a new code, please call your [place] clinic or [phone].        Care EveryWhere ID     This is your Care EveryWhere ID. This could be used by other organizations to access your [place] medical records  [id number]        Your Vitals Were     Pulse BMI (Body Mass Index)                79 30.65 kg/m2           Blood Pressure from Last 3 Encounters:   18 103/70   18 101/66   18 113/77    Weight from Last 3 Encounters:   18 174 lb 6.4 oz (79.1 kg) "   03/26/18 168 lb 12.8 oz (76.6 kg)              Today, you had the following     No orders found for display       Primary Care Provider Office Phone # Fax #    Mckenzie Wood [phone]       [address]  [facility] [address]        Equal Access to Services     JEFFERY MARTINA : Hadii aad ku hadasho Soomaali, waaxda luqadaha, qaybta kaalmada adeegyada, waxay idiin hayaan adeeg chiquis laramilan ah. [facility] [phone].    ATENCIÓN: Si habla español, tiene a mei disposición servicios gratuitos de asistencia lingüística. Llame al [phone].    We comply with applicable federal civil rights laws and [place] laws. We do not discriminate on the basis of race, color, national origin, age, disability, sex, sexual orientation, or gender identity.            Thank you!     Thank you for choosing [facility]  for your care. Our goal is always to provide you with excellent care. Hearing back from our patients is one way we can continue to improve our services. Please take a few minutes to complete the written survey that you may receive in the mail after your visit with us. Thank you!             Your Updated Medication List - Protect others around you: Learn how to safely use, store and throw away your medicines at www.disposemymeds.org.          This list is accurate as of 4/17/18  4:11 PM.  Always use your most recent med list.                   Brand Name Dispense Instructions for use Diagnosis    alcohol swab prep pads     100 each    Use to swab area of injection/shelby as directed.    Type 2 diabetes mellitus with complication, without long-term current use of insulin (H)       aspirin 81 MG tablet     90 tablet    Take 1 tablet (81 mg) by mouth daily    Type 2 diabetes mellitus with complication, without long-term current use of insulin (H)       blood glucose monitoring lancets     100 each    Use to test blood sugar 1-2 times daily or as directed.    Type 2 diabetes mellitus without  complication, with long-term current use of insulin (H)       blood glucose monitoring test strip    ONETOUCH VERIO IQ    100 each    Use to test blood sugar 1-2 times daily or as directed.    Type 2 diabetes mellitus without complication, with long-term current use of insulin (H)       IBUPROFEN PO      Take 1 tablet by mouth daily as needed for moderate pain        insulin glargine 100 UNIT/ML injection    LANTUS SOLOSTAR    30 mL    Inject 15 Units Subcutaneous At Bedtime    Type 2 diabetes mellitus with complication, without long-term current use of insulin (H)       insulin pen needle 32G X 4 MM    ULTICARE MICRO    100 each    Use  pen needles daily or as directed.    Type 2 diabetes mellitus with complication, without long-term current use of insulin (H)       lisinopril 5 MG tablet    PRINIVIL/ZESTRIL    90 tablet    Take 1 tablet (5 mg) by mouth daily    Type 2 diabetes mellitus with complication, without long-term current use of insulin (H)       metFORMIN 500 MG 24 hr tablet    GLUCOPHAGE-XR    60 tablet    Take 2 tablets (1,000 mg) by mouth daily (with dinner)        Anywhere to Go FLEX SYSTEM w/Device Kit     1 kit    1 strip 2 times daily    Type 2 diabetes mellitus without complication, with long-term current use of insulin (H)       TYLENOL PO      Take 1 tablet by mouth daily as needed for mild pain or fever

## 2018-04-17 NOTE — PROGRESS NOTES
"SUBJECTIVE/OBJECTIVE:                Norma Arcos is a 47 year old male coming in for a follow-up visit for Medication Therapy Management.  He was referred to me from Dr. Wood. Korean  present.     Chief Complaint: Follow up from our visit on 4/3/18. Diabetes, has pain in back/chest for past 5 days.     Tobacco: No tobacco use  Alcohol: none    Medication Adherence/Access:  no issues reported    Pain: Later in visit, patient stated that he is having pain that starts in the middle of the back that follows to front of chest. He states pain has been going on for 5 days. Describes pain as \"somewhat bothersome, sharp at times\". No SOB at rest or upon exertion. No dizziness, lightheadedness. No headache, No blurred vision. Chest doesn't feel tight. He has had this pain once previously when he was living in [place] 1 year ago, pain went away on its own after a couple days. Doesn't know what brought about pain, pain is intermittent. His work is labor intensive, but doesn't think this is a work related injury.     Diabetes:  Pt currently taking Lantus 15 units daily and metformin XR 1000 mg once daily. Pt is not experiencing side effects. Hasn't gotten call to schedule appointment with Diabetes Education. States that he is having pain when taking blood sugar, so hasn't been taking very often. Patient wishes to follow-up with Dr. Wood for physical examination.   SMB time a week.   Ranges (patient reported):   4/7: 169  4/9: 158  4/15: 131  4/17: 123   Patient is not experiencing hypoglycemia  Recent symptoms of high blood sugar? none  Eye exam: up to date, had 4/10 - found beginning of cataracts and got prescription for eye glasses.   Foot exam: due  Microalbumin is not < 30 mg/g. Pt is taking an ACEi/ARB (lisinopril).  Aspirin: Taking 81mg daily and denies side effects.   Has not met with Diabetes Education yet, hasn't gotten call.     Current labs include:  Today's Vitals: /70  Pulse 79  Wt 174 lb " 6.4 oz (79.1 kg)  BMI 30.65 kg/m2  BP Readings from Last 3 Encounters:   04/17/18 103/70   04/03/18 101/66   03/26/18 113/77     Lab Results   Component Value Date    A1C >15.0 03/26/2018     Liver Function Studies -   Recent Labs   Lab Test  03/26/18   1621   PROTTOTAL  7.9   ALBUMIN  4.1   BILITOTAL  1.0   ALKPHOS  74   AST  17   ALT  19       Lab Results   Component Value Date    UCRR 62 03/26/2018    MICROL 46 03/26/2018    UMALCR 73.28 (H) 03/26/2018       Last Basic Metabolic Panel:  Lab Results   Component Value Date     03/26/2018      Lab Results   Component Value Date    POTASSIUM 4.5 03/26/2018     Lab Results   Component Value Date    CHLORIDE 101 03/26/2018     Lab Results   Component Value Date    BUN 12 03/26/2018     Lab Results   Component Value Date    CR 0.82 03/26/2018     GFR Estimate   Date Value Ref Range Status   03/26/2018 >90 >60 mL/min/1.7m2 Final     Comment:     Non  GFR Calc     GFR Estimate If Black   Date Value Ref Range Status   03/26/2018 >90 >60 mL/min/1.7m2 Final     Comment:      GFR Calc     TSH   Date Value Ref Range Status   03/26/2018 1.75 0.40 - 4.00 mU/L Final     ASSESSMENT:              Current medications were reviewed today as discussed above.      Medication Adherence: good, no issues identified    Pain: Needs improvement. Discussed current pain patient is experiencing with Dr. Wood - was recommended that if patient is having chest pain to go to ED. Patient declined stating he didn't his pain was bothersome enough to go to ED, encouraged patient to be seen at ED if pain continues/worsens. Recommended that patient be seen by PCP within the next few days if he declines going to the ED, patient preferred scheduling for 2 weeks out.     Diabetes: Needs Improvement. Patient is not meeting A1c goal of < 7%. Self monitoring of blood glucose is improving. Pt would benefit from Metformin: increase dose to 1500 mg once daily. Patient  encouraged to test fasting blood sugars daily.     PLAN:                  1. If pain continues/worsens, encouraged patient to go to Emergency Room.     2. Increase metformin XL dose to 1500 mg (3 tablets) daily.     3. Test blood sugar daily AM fasting     4. MTM will follow-up about diabetes education referral. If not hear from them within the week, patient can call Diabetes Education [phone].     I spent 30 minutes with this patient today. All changes were made via verbal approval with Mckenzie oWod. A copy of the visit note was provided to the patient's primary care provider.     Will follow up in 6 weeks with MTM and 2 weeks with Dr. Wood - patient preference    I concur with the note as dictated above which reflects our joint assessment and plan.   Kassidy Craft, PharmD    The patient was given a summary of these recommendations as an after visit summary.    Devora Castaneda, PharmD  Pharmaceutical Care Resident   Pager: [phone]

## 2018-04-17 NOTE — PATIENT INSTRUCTIONS
Recommendations from today's MTM visit:                                                      1. If pain continues or worsens, should go to emergency room.     2. Will talk to Dr. Wood about increasing metformin XL to 1500 mg (3 tablets) once daily    3. Take blood sugar at least 3 times weekly, if not every day, in the morning fasting before breakfast.     4. MTM pharmacist will follow-up so you will get a call from Diabetes Education. Can call Diabetes Education: FMG: Diabetes Education - [facility] [phone]       Next MTM visit: 1 month     To schedule another MTM appointment, please call the clinic directly or you may call the MTM scheduling line at [phone] or toll-free at [phone].     My Clinical Pharmacist's contact information:                                                      It was a pleasure seeing you today!  Please feel free to contact me with any questions or concerns you have.      Devora Castaneda, PharmD  Pharmaceutical Care Resident   Pager: [phone]      You may receive a survey about the MTM services you received.  I would appreciate your feedback to help me serve you better in the future. Please fill it out and return it when you can. Your comments will be anonymous.

## 2018-04-19 RX ORDER — METFORMIN HCL 500 MG
1500 TABLET, EXTENDED RELEASE 24 HR ORAL
Qty: 90 TABLET | Refills: 1 | Status: SHIPPED | OUTPATIENT
Start: 2018-04-19 | End: 2018-05-14

## 2018-04-19 RX ORDER — METFORMIN HCL 500 MG
1500 TABLET, EXTENDED RELEASE 24 HR ORAL
Qty: 90 TABLET | Refills: 1 | Status: SHIPPED | OUTPATIENT
Start: 2018-04-19 | End: 2018-04-19

## 2018-05-01 ENCOUNTER — OFFICE VISIT (OUTPATIENT)
Dept: INTERNAL MEDICINE | Facility: CLINIC | Age: 48
End: 2018-05-01
Payer: COMMERCIAL

## 2018-05-01 VITALS
HEIGHT: 63 IN | RESPIRATION RATE: 16 BRPM | SYSTOLIC BLOOD PRESSURE: 114 MMHG | TEMPERATURE: 98.6 F | WEIGHT: 174 LBS | DIASTOLIC BLOOD PRESSURE: 80 MMHG | HEART RATE: 82 BPM | OXYGEN SATURATION: 99 % | BODY MASS INDEX: 30.83 KG/M2

## 2018-05-01 DIAGNOSIS — R07.9 INTERMITTENT CHEST PAIN: ICD-10-CM

## 2018-05-01 DIAGNOSIS — E11.8 TYPE 2 DIABETES MELLITUS WITH COMPLICATION, WITHOUT LONG-TERM CURRENT USE OF INSULIN (H): ICD-10-CM

## 2018-05-01 DIAGNOSIS — E11.9 TYPE 2 DIABETES MELLITUS WITHOUT COMPLICATION, WITH LONG-TERM CURRENT USE OF INSULIN (H): Primary | ICD-10-CM

## 2018-05-01 DIAGNOSIS — Z79.4 TYPE 2 DIABETES MELLITUS WITHOUT COMPLICATION, WITH LONG-TERM CURRENT USE OF INSULIN (H): Primary | ICD-10-CM

## 2018-05-01 PROCEDURE — 99214 OFFICE O/P EST MOD 30 MIN: CPT | Performed by: INTERNAL MEDICINE

## 2018-05-01 NOTE — PROGRESS NOTES
SUBJECTIVE:                                                    Norma Arcos is a 47 year old male who presents to clinic today for the following health issues:   here with        Diabetes Follow-up      Patient is checking blood sugars: twice a week - 125 -211- checks at different times of the day     Diabetic concerns: Feels like Metform is causing abdominal pain      Symptoms of hypoglycemia (low blood sugar): none     Paresthesias (numbness or burning in feet) or sores: No     Date of last diabetic eye exam: about one month ago       He has been following with the pharmacist.  He is taking 15 U lantus, and his metformin XL 1000 mg daily. It was recommended he take metformin XL 1500mg however he feels more GI side effects at this dose.     He brings blood sugar log, most BGs in 100-150 range.  He is checking his sugars at different times daily.       Chest pain  Patient also reports intermittent left-sided chest pain, with occasional radiation down to arm. He does not clearly relate this to exertion.  No associated n/v, diaphoresis.  He reports being tested when in [place] and them saying he had a very big heart with some abnormalities, can't remember if it was stress test    BP Readings from Last 2 Encounters:   05/01/18 114/80   04/17/18 103/70     Hemoglobin A1C (%)   Date Value   03/26/2018 >15.0 (H)     Health Maintenance              Problem list and histories reviewed & adjusted, as indicated.  Additional history: as documented    Patient Active Problem List   Diagnosis     Type 2 diabetes mellitus without complication, with long-term current use of insulin (H)     History reviewed. No pertinent surgical history.    Social History   Substance Use Topics     Smoking status: Never Smoker     Smokeless tobacco: Never Used     Alcohol use Yes      Comment: Socially     Family History   Problem Relation Age of Onset     Hypertension Mother      DIABETES Other            ROS:  Constitutional, HEENT,  "cardiovascular, pulmonary, gi and gu systems are negative, except as otherwise noted.    OBJECTIVE:     /80  Pulse 82  Temp 98.6  F (37  C) (Oral)  Resp 16  Ht 5' 3\" (1.6 m)  Wt 174 lb (78.9 kg)  SpO2 99%  BMI 30.82 kg/m2  Body mass index is 30.82 kg/(m^2).  GENERAL: healthy, alert and no distress  NECK: no adenopathy, no asymmetry, masses, or scars and thyroid normal to palpation  RESP: lungs clear to auscultation - no rales, rhonchi or wheezes  CV: regular rate and rhythm, normal S1 S2, no S3 or S4, no murmur, click or rub, no peripheral edema and peripheral pulses strong  ABDOMEN: soft, nontender, no hepatosplenomegaly, no masses and bowel sounds normal  MS: no gross musculoskeletal defects noted, no edema  Diabetic foot exam: normal DP and PT pulses, no trophic changes or ulcerative lesions and normal sensory exam      Diagnostic Test Results:  none     ASSESSMENT/PLAN:         (E11.9,  Z79.4) Type 2 diabetes mellitus without complication, with long-term current use of insulin (H)  (primary encounter diagnosis)  Comment:   HgbA1c >15%.  Will recheck in July      Prevention:  On Asa 81mg.  Will return for fasting lipids  Regimen:  Continue on Insulin lantus 15 U, plus 1g metformin daily for now  Miroalbuminuria present, started on lisinopril   Foot exam: done today  Eye exam: done, no retinopathy        Plan: Pneumococcal vaccine 23 valent PPSV23          (Pneumovax) [59708], DIABETES EDUCATOR REFERRAL      (R07.9) Intermittent chest pain  Comment: Given cardiac risk factors in including DMII, will proceed with stress test  Plan: Exercise Stress Echocardiogram    Health Maintenance  Pneumovax 23 due---did not administer today but will at next visit  Lipid test-will return for fasting labs              Mckenzie Wood MD  [facility]      "

## 2018-05-01 NOTE — MR AVS SNAPSHOT
After Visit Summary   5/1/2018    Norma Arcos    MRN: [id number]           Patient Information     Date Of Birth          1970        Visit Information        Provider Department      5/1/2018 3:45 PM Mckenzie Wood MD; [facility]        Today's Diagnoses     Type 2 diabetes mellitus without complication, with long-term current use of insulin (H)    -  1    Type 2 diabetes mellitus with complication, without long-term current use of insulin (H)        Intermittent chest pain          Care Instructions    Call Cardiac:  [phone] to schedule the stress test                Follow-ups after your visit        Additional Services     DIABETES EDUCATOR REFERRAL       DIABETES SELF MANAGEMENT TRAINING (DSMT)      Your provider has referred you to Diabetes Education: FMG: Diabetes Education - [facility] [phone]   [email or web address]     If an urgent visit is needed or A1C is above 12, Care Team to call the Diabetes  Education Team at [phone] or send an In Basket message to the Diabetes Education Pool (P DIAB ED-PATIENT CARE).    A  will call you to make your appointment. If it has been more than 3 business days since your referral was placed, please call the above phone number to schedule.    Type of training and number of hours: New Diagnosis: Initial group DSMT - 10 hours.      Diabetes Type: Type 2 - On Insulin   Medicare covers: 10 hours of initial DSMT in 12 month period from the time of first visit, plus 2 hours of follow-up DSMT annually, and additional hours as requested for insulin training.         Diabetes Co-Morbidities: kidney disease               A1C Goal:  <8.0       A1C is: Lab Results       Component                Value               Date                       A1C                      >15.0               03/26/2018              MEDICAL NUTRITION THERAPY (MNT) for  Diabetes    Medical Nutrition Therapy with a Registered Dietitian can be provided in coordination with Diabetes Self-Management Training to assist in achieving optimal diabetes management.    MNT Type and Hours: New diagnosis: Initial MNT - 3 hours                       Medicare will cover: 3 hours initial MNT in 12 month period after first visit, plus 2 hours of follow-up MNT annually        Diabetes Education Topics: Comprehensive Knowledge Assessment and Instruction and Knowledge: Healthy Eating, Being Active and Monitoring Blood Sugar    Special Educational Needs Requiring Individual DSMT: None      Please be aware that coverage of these services is subject to the terms and limitations of your health insurance plan.  Call member services at your health plan to determine Diabetes Self-Management Training (Codes  and ) and Medical Nutrition Therapy (Codes 84588 and 21711) benefits and ask which blood glucose monitor brands are covered by your plan.  Please bring the following with you to your appointment:    (1)  List of current medications   (2)  List of Blood Glucose Monitor brands that are covered by your insurance plan  (3)  Blood Glucose Monitor and log book  (4)   Food records for the 3 days prior to your visit    The Certified Diabetes Educator may make diabetes medication adjustments per the CDE Protocol and Collaborative Practice Agreement.                  Your next 10 appointments already scheduled     May 29, 2018  1:45 PM CDT   SHORT with Kassidy Craft [facility] [address]  [address]  [facility] [address] [phone]              Future tests that were ordered for you today     Open Future Orders        Priority Expected Expires Ordered    Exercise Stress Echocardiogram Routine  5/1/2019 5/1/2018            Who to contact     If you have questions or need follow up information about today's clinic visit or your  "schedule please contact [facility] directly at [phone].  Normal or non-critical lab and imaging results will be communicated to you by MyChart, letter or phone within 4 business days after the clinic has received the results. If you do not hear from us within 7 days, please contact the clinic through MyChart or phone. If you have a critical or abnormal lab result, we will notify you by phone as soon as possible.  Submit refill requests through [employer] or call your pharmacy and they will forward the refill request to us. Please allow 3 business days for your refill to be completed.          Additional Information About Your Visit        [employer] Information     [employer] lets you send messages to your doctor, view your test results, renew your prescriptions, schedule appointments and more. To sign up, go to [email or web address] . Click on \"Log in\" on the left side of the screen, which will take you to the Welcome page. Then click on \"Sign up Now\" on the right side of the page.     You will be asked to enter the access code listed below, as well as some personal information. Please follow the directions to create your username and password.     Your access code is: [email or web address]  Expires: 2018  4:07 PM     Your access code will  in 90 days. If you need help or a new code, please call your [place] clinic or [phone].        Care EveryWhere ID     This is your Care EveryWhere ID. This could be used by other organizations to access your [place] medical records  [id number]        Your Vitals Were     Pulse Temperature Respirations Height Pulse Oximetry BMI (Body Mass Index)    82 98.6  F (37  C) (Oral) 16 5' 3\" (1.6 m) 99% 30.82 kg/m2       Blood Pressure from Last 3 Encounters:   18 114/80   18 103/70   18 101/66    Weight from Last 3 Encounters:   18 174 lb (78.9 kg)   18 174 lb 6.4 oz (79.1 kg)   18 168 lb 12.8 oz (76.6 kg)              We " Performed the Following     DIABETES EDUCATOR REFERRAL     Pneumococcal vaccine 23 valent PPSV23  (Pneumovax) [35183]        Primary Care Provider Office Phone # Fax #    Mckenzie Wood [phone]       [address]  [facility] [address]        Equal Access to Services     [facility] MARTINA : Hadii aad ku hadasho Soomaali, waaxda luqadaha, qaybta kaalmada adeegyada, waxay bridgetin haysowmyan flora hebertmelisa miller . So [facility] [phone].    ATENCIÓN: Si habla español, tiene a mei disposición servicios gratuitos de asistencia lingüística. Brendename al [phone].    We comply with applicable federal civil rights laws and [place] laws. We do not discriminate on the basis of race, color, national origin, age, disability, sex, sexual orientation, or gender identity.            Thank you!     Thank you for choosing [facility]  for your care. Our goal is always to provide you with excellent care. Hearing back from our patients is one way we can continue to improve our services. Please take a few minutes to complete the written survey that you may receive in the mail after your visit with us. Thank you!             Your Updated Medication List - Protect others around you: Learn how to safely use, store and throw away your medicines at www.disposemymeds.org.          This list is accurate as of 5/1/18  4:51 PM.  Always use your most recent med list.                   Brand Name Dispense Instructions for use Diagnosis    alcohol swab prep pads     100 each    Use to swab area of injection/shelby as directed.    Type 2 diabetes mellitus with complication, without long-term current use of insulin (H)       aspirin 81 MG tablet     90 tablet    Take 1 tablet (81 mg) by mouth daily    Type 2 diabetes mellitus with complication, without long-term current use of insulin (H)       blood glucose monitoring lancets     100 each    Use to test blood sugar 1-2 times daily or as directed.    Type 2 diabetes  mellitus without complication, with long-term current use of insulin (H)       blood glucose monitoring test strip    ONETOUCH VERIO IQ    100 each    Use to test blood sugar 1-2 times daily or as directed.    Type 2 diabetes mellitus without complication, with long-term current use of insulin (H)       IBUPROFEN PO      Take 1 tablet by mouth daily as needed for moderate pain        insulin glargine 100 UNIT/ML injection    LANTUS SOLOSTAR    30 mL    Inject 15 Units Subcutaneous At Bedtime    Type 2 diabetes mellitus with complication, without long-term current use of insulin (H)       insulin pen needle 32G X 4 MM    ULTICARE MICRO    100 each    Use  pen needles daily or as directed.    Type 2 diabetes mellitus with complication, without long-term current use of insulin (H)       lisinopril 5 MG tablet    PRINIVIL/ZESTRIL    90 tablet    Take 1 tablet (5 mg) by mouth daily    Type 2 diabetes mellitus with complication, without long-term current use of insulin (H)       metFORMIN 500 MG 24 hr tablet    GLUCOPHAGE-XR    90 tablet    Take 3 tablets (1,500 mg) by mouth daily (with dinner)    Type 2 diabetes mellitus without complication, with long-term current use of insulin (H)       AVISTOAdifyIO FLEX SYSTEM w/Device Kit     1 kit    1 strip 2 times daily    Type 2 diabetes mellitus without complication, with long-term current use of insulin (H)       TYLENOL PO      Take 1 tablet by mouth daily as needed for mild pain or fever

## 2018-05-01 NOTE — NURSING NOTE
"Chief Complaint   Patient presents with     Hypertension     Diabetes       Initial /80  Pulse 82  Temp 98.6  F (37  C) (Oral)  Resp 16  Ht 5' 3\" (1.6 m)  Wt 174 lb (78.9 kg)  SpO2 99%  BMI 30.82 kg/m2 Estimated body mass index is 30.82 kg/(m^2) as calculated from the following:    Height as of this encounter: 5' 3\" (1.6 m).    Weight as of this encounter: 174 lb (78.9 kg).  Medication Reconciliation: complete    "

## 2018-05-07 ENCOUNTER — TELEPHONE (OUTPATIENT)
Dept: INTERNAL MEDICINE | Facility: CLINIC | Age: 48
End: 2018-05-07

## 2018-05-07 DIAGNOSIS — E11.9 TYPE 2 DIABETES MELLITUS WITHOUT COMPLICATION, WITH LONG-TERM CURRENT USE OF INSULIN (H): Primary | ICD-10-CM

## 2018-05-07 DIAGNOSIS — Z79.4 TYPE 2 DIABETES MELLITUS WITHOUT COMPLICATION, WITH LONG-TERM CURRENT USE OF INSULIN (H): Primary | ICD-10-CM

## 2018-05-07 RX ORDER — INSULIN GLARGINE 100 [IU]/ML
15 INJECTION, SOLUTION SUBCUTANEOUS DAILY
Qty: 30 ML | Refills: 1 | Status: SHIPPED | OUTPATIENT
Start: 2018-05-07 | End: 2018-05-29

## 2018-05-07 NOTE — TELEPHONE ENCOUNTER
Letter received from [facility] states that Lantus Solostar is not formulary.  Suggested alternative Basaglar.  Please send alternative RX or advise if PA should be initiated.

## 2018-05-14 DIAGNOSIS — E11.8 TYPE 2 DIABETES MELLITUS WITH COMPLICATION, WITHOUT LONG-TERM CURRENT USE OF INSULIN (H): ICD-10-CM

## 2018-05-14 DIAGNOSIS — E11.9 TYPE 2 DIABETES MELLITUS WITHOUT COMPLICATION, WITH LONG-TERM CURRENT USE OF INSULIN (H): ICD-10-CM

## 2018-05-14 DIAGNOSIS — Z79.4 TYPE 2 DIABETES MELLITUS WITHOUT COMPLICATION, WITH LONG-TERM CURRENT USE OF INSULIN (H): ICD-10-CM

## 2018-05-14 RX ORDER — METFORMIN HCL 500 MG
1000 TABLET, EXTENDED RELEASE 24 HR ORAL
Qty: 60 TABLET | Refills: 1 | Status: SHIPPED | OUTPATIENT
Start: 2018-05-14 | End: 2018-06-18

## 2018-05-15 ENCOUNTER — HOSPITAL ENCOUNTER (OUTPATIENT)
Dept: CARDIOLOGY | Facility: CLINIC | Age: 48
Discharge: HOME | End: 2018-05-15
Attending: INTERNAL MEDICINE | Admitting: INTERNAL MEDICINE
Payer: COMMERCIAL

## 2018-05-15 DIAGNOSIS — R07.9 INTERMITTENT CHEST PAIN: ICD-10-CM

## 2018-05-15 PROCEDURE — 93017 CV STRESS TEST TRACING ONLY: CPT

## 2018-05-15 PROCEDURE — 93018 CV STRESS TEST I&R ONLY: CPT | Performed by: INTERNAL MEDICINE

## 2018-05-15 PROCEDURE — 93016 CV STRESS TEST SUPVJ ONLY: CPT | Performed by: INTERNAL MEDICINE

## 2018-05-15 PROCEDURE — 93321 DOPPLER ECHO F-UP/LMTD STD: CPT | Mod: 26 | Performed by: INTERNAL MEDICINE

## 2018-05-15 PROCEDURE — 93350 STRESS TTE ONLY: CPT | Mod: 26 | Performed by: INTERNAL MEDICINE

## 2018-05-15 PROCEDURE — 93325 DOPPLER ECHO COLOR FLOW MAPG: CPT | Mod: 26 | Performed by: INTERNAL MEDICINE

## 2018-05-29 ENCOUNTER — OFFICE VISIT (OUTPATIENT)
Dept: PHARMACY | Facility: CLINIC | Age: 48
End: 2018-05-29
Payer: COMMERCIAL

## 2018-05-29 VITALS
HEART RATE: 84 BPM | WEIGHT: 178 LBS | DIASTOLIC BLOOD PRESSURE: 69 MMHG | SYSTOLIC BLOOD PRESSURE: 110 MMHG | BODY MASS INDEX: 31.53 KG/M2

## 2018-05-29 DIAGNOSIS — E11.8 TYPE 2 DIABETES MELLITUS WITH COMPLICATION, WITHOUT LONG-TERM CURRENT USE OF INSULIN (H): ICD-10-CM

## 2018-05-29 DIAGNOSIS — R80.9 MICROALBUMINURIA: ICD-10-CM

## 2018-05-29 DIAGNOSIS — R07.9 CHEST PAIN, UNSPECIFIED TYPE: Primary | ICD-10-CM

## 2018-05-29 DIAGNOSIS — J30.1 ACUTE ALLERGIC RHINITIS DUE TO POLLEN, UNSPECIFIED SEASONALITY: ICD-10-CM

## 2018-05-29 PROCEDURE — 99606 MTMS BY PHARM EST 15 MIN: CPT | Performed by: PHARMACIST

## 2018-05-29 PROCEDURE — 99607 MTMS BY PHARM ADDL 15 MIN: CPT | Performed by: PHARMACIST

## 2018-05-29 NOTE — PATIENT INSTRUCTIONS
Recommendations from today's MTM visit:                                                      1. You can try cetirizine 10 mg for the cough    2.  Recheck A1c end of June/ early July    3.  Talk to  regarding insurance coverage    Next MTM visit: 2 months    To schedule another MTM appointment, please call the clinic directly or you may call the MTM scheduling line at [phone] or toll-free at [phone].     My Clinical Pharmacist's contact information:                                                      It was a pleasure seeing you today!  Please feel free to contact me with any questions or concerns you have.      Kassidy Craft , Pharm D  [phone] (phone)  [phone] (pager)  Medication Therapy Management Pharmacist     You may receive a survey about the MTM services you received.  I would appreciate your feedback to help me serve you better in the future. Please fill it out and return it when you can. Your comments will be anonymous.

## 2018-05-29 NOTE — PROGRESS NOTES
SUBJECTIVE/OBJECTIVE:                Norma Arcos is a 47 year old male coming in for a follow-up visit for Medication Therapy Management.  He was referred to me from Dr. Wood. Persian  present.     Chief Complaint: Follow up from our visit on 18 - has been coughing lately and using cough syrup that caused blood sugar to go up; found out he is losing his insurance at the end of the month    Tobacco: No tobacco use  Alcohol: none    Medication Adherence/Access:  no issues reported    Cough:  Coughing more when exposed to dust.  Was taking cough syrup but stopped because it was raising blood sugars.  Unsure if he has allergies.  No other symptoms.      Pain: Seen by Dr. Wood.  ECHO was normal.  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  Doesn't think he discussed this with provider at last visit.  Will take ibuprofen if pain is really bad; has not used for some time.    Diabetes:  Pt currently taking Lantus 15 units daily and metformin XR 1000 mg once daily. Pt is not experiencing side effects.   SMB time a week.   Ranges (patient log):   110, 125, 109, 112, 115, 120, 116, 114, 121, 108, 71, 103, 122    Patient is not experiencing hypoglycemia  Recent symptoms of high blood sugar? none  Eye exam: up to date, had 4/10 - found beginning of cataracts and got prescription for eye glasses.   Foot exam: due  Microalbumin is not < 30 mg/g. Pt is taking an ACEi/ARB (lisinopril).  Aspirin: Taking 81mg daily and denies side effects.   Has not met with Diabetes Education yet, hasn't gotten call.     Lab Results   Component Value Date    A1C >15.0 2018     Hypertension: Current medications include lisinopril 5 mg daily.  Patient does not self-monitor BP.  Patient reports no current medication side effects.      Current labs include:  Today's Vitals: /69  Pulse 84   Wt 178 lb (80.7 kg)  BMI 31.53 kg/m2  BP Readings from Last 3 Encounters:   05/01/18 114/80   04/17/18 103/70   04/03/18 101/66     Lab Results   Component Value Date    A1C >15.0 03/26/2018     Liver Function Studies -   Recent Labs   Lab Test  03/26/18   1621   PROTTOTAL  7.9   ALBUMIN  4.1   BILITOTAL  1.0   ALKPHOS  74   AST  17   ALT  19       Lab Results   Component Value Date    UCRR 62 03/26/2018    MICROL 46 03/26/2018    UMALCR 73.28 (H) 03/26/2018       Last Basic Metabolic Panel:  Lab Results   Component Value Date     03/26/2018      Lab Results   Component Value Date    POTASSIUM 4.5 03/26/2018     Lab Results   Component Value Date    CHLORIDE 101 03/26/2018     Lab Results   Component Value Date    BUN 12 03/26/2018     Lab Results   Component Value Date    CR 0.82 03/26/2018     GFR Estimate   Date Value Ref Range Status   03/26/2018 >90 >60 mL/min/1.7m2 Final     Comment:     Non  GFR Calc     GFR Estimate If Black   Date Value Ref Range Status   03/26/2018 >90 >60 mL/min/1.7m2 Final     Comment:      GFR Calc     TSH   Date Value Ref Range Status   03/26/2018 1.75 0.40 - 4.00 mU/L Final     ASSESSMENT:              Current medications were reviewed today as discussed above.      Medication Adherence: good, no issues identified.  Referred to [facility] for help with insurance coverage.    Cough:  Recommend trial of antihistamine as it seems his symptoms are triggered by environmental factors      Pain: no openings in providers schedules this week and patient really wants to be seen before he loses insurance, after discussing with provider in clinic it was recommended he see urgent care at [facility] today.    Diabetes:  Improved.  Home readings are much improved and at goal.  He will continue current regimen and recheck labs in June/July    Hypertension: Stable. Patient is meeting BP goal of < 130/80mmHg.    PLAN:                  1. You can try cetirizine 10 mg for the  cough  2.  Recheck A1c end of June/ early July  3.  Referred to  regarding insurance coverage  4.  Pt to see [facility] for locked thumb      I spent 30 minutes with this patient today. All changes were made via verbal approval with Mckenzie Wood. A copy of the visit note was provided to the patient's primary care provider.     Will follow up in 2 months with MTM         The patient was given a summary of these recommendations as an after visit summary.    Kassidy Craft , Pharm D  [phone] (phone)  [phone] (pager)  Medication Therapy Management Pharmacist

## 2018-05-29 NOTE — MR AVS SNAPSHOT
After Visit Summary   5/29/2018    Norma Arcos    MRN: [id number]           Patient Information     Date Of Birth          1970        Visit Information        Provider Department      5/29/2018 1:45 PM Kassidy Craft, [facility]; MULTILINGUAL WORD [facility] MTM        Today's Diagnoses     Type 2 diabetes mellitus with complication, without long-term current use of insulin (H)          Care Instructions    Recommendations from today's MTM visit:                                                      1. You can try cetirizine 10 mg for the cough    2.  Recheck A1c end of June/ early July    3.  Talk to  regarding insurance coverage    Next MTM visit: 2 months    To schedule another MTM appointment, please call the clinic directly or you may call the MTM scheduling line at [phone] or toll-free at [phone].     My Clinical Pharmacist's contact information:                                                      It was a pleasure seeing you today!  Please feel free to contact me with any questions or concerns you have.      Kassidy Craft , Pharm D  [phone] (phone)  [phone] (pager)  Medication Therapy Management Pharmacist     You may receive a survey about the MTM services you received.  I would appreciate your feedback to help me serve you better in the future. Please fill it out and return it when you can. Your comments will be anonymous.                      Follow-ups after your visit        Your next 10 appointments already scheduled     Jun 05, 2018  9:45 AM CDT   Diabetic Education with [place] [facility]   [facility] ([facility])    [address]  [facility] [id number] [phone]            Jul 03, 2018  1:30 PM CDT   SHORT with Mckenzie Wood MD   [facility]    [address]  [facility] [id number] [phone]              Who to  "contact     If you have questions or need follow up information about today's clinic visit or your schedule please contact [facility] directly at [phone].  Normal or non-critical lab and imaging results will be communicated to you by MyChart, letter or phone within 4 business days after the clinic has received the results. If you do not hear from us within 7 days, please contact the clinic through MyChart or phone. If you have a critical or abnormal lab result, we will notify you by phone as soon as possible.  Submit refill requests through [employer] or call your pharmacy and they will forward the refill request to us. Please allow 3 business days for your refill to be completed.          Additional Information About Your Visit        [employer] Information     [employer] lets you send messages to your doctor, view your test results, renew your prescriptions, schedule appointments and more. To sign up, go to [email or web address] . Click on \"Log in\" on the left side of the screen, which will take you to the Welcome page. Then click on \"Sign up Now\" on the right side of the page.     You will be asked to enter the access code listed below, as well as some personal information. Please follow the directions to create your username and password.     Your access code is: [email or web address]  Expires: 2018  4:07 PM     Your access code will  in 90 days. If you need help or a new code, please call your [facility] clinic or [phone].        Care EveryWhere ID     This is your Care EveryWhere ID. This could be used by other organizations to access your [facility] medical records  [id number]        Your Vitals Were     Pulse BMI (Body Mass Index)                84 31.53 kg/m2           Blood Pressure from Last 3 Encounters:   18 110/69   18 114/80   18 103/70    Weight from Last 3 Encounters:   18 178 lb (80.7 kg)   18 174 lb (78.9 kg)   18 174 lb 6.4 oz (79.1 kg)            "   Today, you had the following     No orders found for display         Where to get your medicines      These medications were sent to [facility] [place], [place] - [address]  [address] [facility] [address]     Phone:  [phone]     insulin glargine 100 UNIT/ML injection          Primary Care Provider Office Phone # Fax #    Mckenzie Wood [phone]       [address]  [facility] [address]        Equal Access to Services     [facility]: Hadii aad ku hadasho Soomaali, waaxda luqadaha, qaybta kaalmada adeegyada, waxay idiin hayaan adeeg kharamelisa miller . So [facility] [phone].    ATENCIÓN: Si habla español, tiene a mei disposición servicios gratuitos de asistencia lingüística. [facility] [phone].    We comply with applicable federal civil rights laws and [place] laws. We do not discriminate on the basis of race, color, national origin, age, disability, sex, sexual orientation, or gender identity.            Thank you!     Thank you for choosing [facility]  for your care. Our goal is always to provide you with excellent care. Hearing back from our patients is one way we can continue to improve our services. Please take a few minutes to complete the written survey that you may receive in the mail after your visit with us. Thank you!             Your Updated Medication List - Protect others around you: Learn how to safely use, store and throw away your medicines at www.disposemymeds.org.          This list is accurate as of 5/29/18  3:04 PM.  Always use your most recent med list.                   Brand Name Dispense Instructions for use Diagnosis    alcohol swab prep pads     100 each    Use to swab area of injection/shelby as directed.    Type 2 diabetes mellitus with complication, without long-term current use of insulin (H)       aspirin 81 MG tablet     90 tablet    Take 1 tablet (81 mg) by mouth daily    Type 2 diabetes mellitus with  complication, without long-term current use of insulin (H)       blood glucose monitoring lancets     100 each    Use to test blood sugar 1-2 times daily or as directed.    Type 2 diabetes mellitus without complication, with long-term current use of insulin (H)       blood glucose monitoring test strip    ONETOUCH VERIO IQ    100 each    Use to test blood sugar 1-2 times daily or as directed.    Type 2 diabetes mellitus without complication, with long-term current use of insulin (H)       IBUPROFEN PO      Take 1 tablet by mouth daily as needed for moderate pain        insulin glargine 100 UNIT/ML injection    LANTUS SOLOSTAR    30 mL    Inject 15 Units Subcutaneous At Bedtime    Type 2 diabetes mellitus with complication, without long-term current use of insulin (H)       insulin pen needle 32G X 4 MM    ULTICARE MICRO    100 each    Use  pen needles daily or as directed.    Type 2 diabetes mellitus with complication, without long-term current use of insulin (H)       lisinopril 5 MG tablet    PRINIVIL/ZESTRIL    90 tablet    Take 1 tablet (5 mg) by mouth daily    Type 2 diabetes mellitus with complication, without long-term current use of insulin (H)       metFORMIN 500 MG 24 hr tablet    GLUCOPHAGE-XR    60 tablet    Take 2 tablets (1,000 mg) by mouth daily (with dinner)    Type 2 diabetes mellitus without complication, with long-term current use of insulin (H)       ONETOUCH VERIO FLEX SYSTEM w/Device Kit     1 kit    1 strip 2 times daily    Type 2 diabetes mellitus without complication, with long-term current use of insulin (H)       TYLENOL PO      Take 1 tablet by mouth daily as needed for mild pain or fever

## 2018-05-30 ENCOUNTER — PATIENT OUTREACH (OUTPATIENT)
Dept: CARE COORDINATION | Facility: CLINIC | Age: 48
End: 2018-05-30

## 2018-05-30 NOTE — PROGRESS NOTES
Clinic Care Coordination Contact  [facility]/Voicemail       Clinical Data: Care Coordinator Outreach  Outreach attempted x 1.  Left message on voicemail with call back information and requested return call using an Mauricio . Pt called back and SW obtained an  to assist with getting pt information for insurance. Spoke to pt who stated that he will be losing his insurance at the end of the month. 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 Pt stated that his spouse is working and does not have insurance and his children are on MA. SW provided information for pt to look into insurance through [employer] or [facility]. SW explained that pt would need an appointment for [employer] (phone number given) and that [facility] is at the [facility] in [place] and no appointment is needed. SW indicated that pt should bring bank statements and other financial information to prove income. Pt was given address information for both locations. Pt stated that he tends to work 3-5 days/week and that his spouse works 40 hours/week. Pt stated that he might be over income for [place] insurance. SW indicated that a financial work through [employer] or [facility] could provide options if pt is over income. Pt stated that he needs the outcome of insurance when he leaves from meeting with the resources. SW stated that it will be based on what his income is and if it would be eligible for MA or other insurance programs.     Plan: Care Coordinator will try to reach patient again in 3-5 business days.    HO Dugan, [facility]  Social Work - Care Coordinator  [facility] [place] [place] [place]  Phone: [phone]

## 2018-06-05 ENCOUNTER — TELEPHONE (OUTPATIENT)
Dept: INTERNAL MEDICINE | Facility: CLINIC | Age: 48
End: 2018-06-05

## 2018-06-05 ENCOUNTER — TELEPHONE (OUTPATIENT)
Dept: PHARMACY | Facility: CLINIC | Age: 48
End: 2018-06-05

## 2018-06-05 DIAGNOSIS — E11.8 TYPE 2 DIABETES MELLITUS WITH COMPLICATION, WITHOUT LONG-TERM CURRENT USE OF INSULIN (H): ICD-10-CM

## 2018-06-05 NOTE — TELEPHONE ENCOUNTER
After talking to Dr. Wood- called pt to discuss cough possibly related to ACE'I.  Pt didn't feel it was due to medication because he only has symptoms when he breaths in dust or something outside.  Told him we can try to hold lisinopril to see if symptoms improve. He doesn't have insurance right now so unable to start ARB inplace of the ACE'I.  He will return to the clinic for follow-up once he has insurance again.

## 2018-06-05 NOTE — TELEPHONE ENCOUNTER
His insurance plan doesn't cover lantus, they prefer basaglar.. Need new rx for basaglar if possible.. Thanks..

## 2018-06-11 RX ORDER — INSULIN GLARGINE 100 [IU]/ML
15 INJECTION, SOLUTION SUBCUTANEOUS AT BEDTIME
Qty: 30 ML | Refills: 1 | Status: SHIPPED | OUTPATIENT
Start: 2018-06-11 | End: 2018-09-06

## 2018-06-12 ENCOUNTER — TELEPHONE (OUTPATIENT)
Dept: INTERNAL MEDICINE | Facility: CLINIC | Age: 48
End: 2018-06-12

## 2018-06-12 NOTE — TELEPHONE ENCOUNTER
Panel Management Review      Patient has the following on his problem list:     Diabetes    ASA: Passed    Last A1C  Lab Results   Component Value Date    A1C >15.0 03/26/2018     A1C tested: FAILED    Last LDL:    No results found for: CHOL  No results found for: HDL  No results found for: LDL  No results found for: TRIG  No results found for: CHOLHDLRATIO  No results found for: NHDL    Is the patient on a Statin? NO             Is the patient on Aspirin? YES    Medications     Salicylates    aspirin 81 MG tablet          Last three blood pressure readings:  BP Readings from Last 3 Encounters:   05/29/18 110/69   05/01/18 114/80   04/17/18 103/70       Date of last diabetes office visit: 5/1/18.     Tobacco History:     History   Smoking Status     Never Smoker   Smokeless Tobacco     Never Used           Composite cancer screening  Chart review shows that this patient is due/due soon for the following None  Summary:    Patient is due/failing the following:   Diabetes.    Action needed:   No action needed. Patient was seen 5/1/18.    Type of outreach:    NA.    Questions for provider review:    None                                                                                                                                    Sobeida Azar MA       Chart routed to closed .

## 2018-06-18 DIAGNOSIS — E11.9 TYPE 2 DIABETES MELLITUS WITHOUT COMPLICATION, WITH LONG-TERM CURRENT USE OF INSULIN (H): ICD-10-CM

## 2018-06-18 DIAGNOSIS — Z79.4 TYPE 2 DIABETES MELLITUS WITHOUT COMPLICATION, WITH LONG-TERM CURRENT USE OF INSULIN (H): ICD-10-CM

## 2018-06-18 DIAGNOSIS — E11.8 TYPE 2 DIABETES MELLITUS WITH COMPLICATION, WITHOUT LONG-TERM CURRENT USE OF INSULIN (H): ICD-10-CM

## 2018-06-18 RX ORDER — METFORMIN HCL 500 MG
1000 TABLET, EXTENDED RELEASE 24 HR ORAL
Qty: 60 TABLET | Refills: 1 | Status: SHIPPED | OUTPATIENT
Start: 2018-06-18 | End: 2018-08-29

## 2018-06-18 RX ORDER — LISINOPRIL 5 MG/1
5 TABLET ORAL DAILY
Qty: 90 TABLET | Refills: 3 | Status: SHIPPED | OUTPATIENT
Start: 2018-06-18 | End: 2018-08-29

## 2018-07-23 ENCOUNTER — TELEPHONE (OUTPATIENT)
Dept: PHARMACY | Facility: CLINIC | Age: 48
End: 2018-07-23

## 2018-07-23 NOTE — TELEPHONE ENCOUNTER
Pt called requesting refill on metformin.  This was refilled in June for qty 60 and 1 refill.  Spoke with pt and told him he could get that refilled and he is due to see Dr Wood and for labs.  He said he would call back to schedule appointment.

## 2018-08-22 ENCOUNTER — TELEPHONE (OUTPATIENT)
Dept: INTERNAL MEDICINE | Facility: CLINIC | Age: 48
End: 2018-08-22

## 2018-08-22 NOTE — TELEPHONE ENCOUNTER
Called patient. He needs a Diabetes follow up appointment with Dr. Wood. Patient will check his schedule and call back to schedule the appointment.

## 2018-08-29 ENCOUNTER — TELEPHONE (OUTPATIENT)
Dept: PHARMACY | Facility: CLINIC | Age: 48
End: 2018-08-29

## 2018-08-29 DIAGNOSIS — E11.8 TYPE 2 DIABETES MELLITUS WITH COMPLICATION, WITHOUT LONG-TERM CURRENT USE OF INSULIN (H): ICD-10-CM

## 2018-08-29 DIAGNOSIS — Z79.4 TYPE 2 DIABETES MELLITUS WITHOUT COMPLICATION, WITH LONG-TERM CURRENT USE OF INSULIN (H): ICD-10-CM

## 2018-08-29 DIAGNOSIS — E11.9 TYPE 2 DIABETES MELLITUS WITHOUT COMPLICATION, WITH LONG-TERM CURRENT USE OF INSULIN (H): ICD-10-CM

## 2018-08-29 RX ORDER — METFORMIN HCL 500 MG
1000 TABLET, EXTENDED RELEASE 24 HR ORAL
Qty: 60 TABLET | Refills: 0 | Status: SHIPPED | OUTPATIENT
Start: 2018-08-29 | End: 2018-10-01

## 2018-08-29 RX ORDER — LISINOPRIL 5 MG/1
5 TABLET ORAL DAILY
Qty: 30 TABLET | Refills: 0 | Status: SHIPPED | OUTPATIENT
Start: 2018-08-29 | End: 2018-09-06 | Stop reason: ALTCHOICE

## 2018-08-29 NOTE — TELEPHONE ENCOUNTER
Patient called to schedule apt and for med refill.  He wanted to be seen tomorrow but Dr. Wood's schedule is full and I'm out.  Scheduled for Thurs. 9/6.  Refills sent for the lisinopril and metformin.

## 2018-09-06 ENCOUNTER — OFFICE VISIT (OUTPATIENT)
Dept: INTERNAL MEDICINE | Facility: CLINIC | Age: 48
End: 2018-09-06
Payer: COMMERCIAL

## 2018-09-06 ENCOUNTER — OFFICE VISIT (OUTPATIENT)
Dept: PHARMACY | Facility: CLINIC | Age: 48
End: 2018-09-06
Payer: COMMERCIAL

## 2018-09-06 VITALS
DIASTOLIC BLOOD PRESSURE: 72 MMHG | BODY MASS INDEX: 31.01 KG/M2 | SYSTOLIC BLOOD PRESSURE: 118 MMHG | WEIGHT: 175 LBS | HEART RATE: 80 BPM | TEMPERATURE: 98.2 F | OXYGEN SATURATION: 99 % | HEIGHT: 63 IN

## 2018-09-06 VITALS
BODY MASS INDEX: 31.04 KG/M2 | DIASTOLIC BLOOD PRESSURE: 74 MMHG | HEART RATE: 83 BPM | SYSTOLIC BLOOD PRESSURE: 127 MMHG | WEIGHT: 175.2 LBS

## 2018-09-06 DIAGNOSIS — E11.8 TYPE 2 DIABETES MELLITUS WITH COMPLICATION, WITHOUT LONG-TERM CURRENT USE OF INSULIN (H): ICD-10-CM

## 2018-09-06 DIAGNOSIS — I10 ESSENTIAL HYPERTENSION: ICD-10-CM

## 2018-09-06 DIAGNOSIS — R05.3 CHRONIC COUGH: ICD-10-CM

## 2018-09-06 DIAGNOSIS — Z79.4 TYPE 2 DIABETES MELLITUS WITHOUT COMPLICATION, WITH LONG-TERM CURRENT USE OF INSULIN (H): Primary | ICD-10-CM

## 2018-09-06 DIAGNOSIS — J30.1 ACUTE ALLERGIC RHINITIS DUE TO POLLEN, UNSPECIFIED SEASONALITY: Primary | ICD-10-CM

## 2018-09-06 DIAGNOSIS — R80.9 MICROALBUMINURIA: ICD-10-CM

## 2018-09-06 DIAGNOSIS — E11.9 TYPE 2 DIABETES MELLITUS WITHOUT COMPLICATION, WITH LONG-TERM CURRENT USE OF INSULIN (H): Primary | ICD-10-CM

## 2018-09-06 PROCEDURE — 99214 OFFICE O/P EST MOD 30 MIN: CPT | Performed by: INTERNAL MEDICINE

## 2018-09-06 PROCEDURE — 99606 MTMS BY PHARM EST 15 MIN: CPT | Performed by: PHARMACIST

## 2018-09-06 PROCEDURE — 99607 MTMS BY PHARM ADDL 15 MIN: CPT | Performed by: PHARMACIST

## 2018-09-06 RX ORDER — BENZONATATE 200 MG/1
200 CAPSULE ORAL 3 TIMES DAILY PRN
Qty: 21 CAPSULE | Refills: 0 | Status: SHIPPED | OUTPATIENT
Start: 2018-09-06 | End: 2018-10-23

## 2018-09-06 RX ORDER — LOSARTAN POTASSIUM 25 MG/1
25 TABLET ORAL DAILY
Qty: 90 TABLET | Refills: 1 | Status: SHIPPED | OUTPATIENT
Start: 2018-09-06 | End: 2018-11-12

## 2018-09-06 NOTE — MR AVS SNAPSHOT
"              After Visit Summary   9/6/2018    Foli RG Arcos    MRN: [id number]           Patient Information     Date Of Birth          1970        Visit Information        Provider Department      9/6/2018 2:00 PM Mckenzie Wood MD; MULTILINGUAL WORD [facility]        Today's Diagnoses     Type 2 diabetes mellitus without complication, with long-term current use of insulin (H)    -  1    Microalbuminuria           Follow-ups after your visit        Future tests that were ordered for you today     Open Future Orders        Priority Expected Expires Ordered    Hemoglobin A1c Routine  9/6/2019 9/6/2018    Lipid panel reflex to direct LDL Fasting Routine  9/6/2019 9/6/2018            Who to contact     If you have questions or need follow up information about today's clinic visit or your schedule please contact [facility] directly at [phone].  Normal or non-critical lab and imaging results will be communicated to you by MyChart, letter or phone within 4 business days after the clinic has received the results. If you do not hear from us within 7 days, please contact the clinic through MyChart or phone. If you have a critical or abnormal lab result, we will notify you by phone as soon as possible.  Submit refill requests through [employer] or call your pharmacy and they will forward the refill request to us. Please allow 3 business days for your refill to be completed.          Additional Information About Your Visit        Care EveryWhere ID     This is your Care EveryWhere ID. This could be used by other organizations to access your [facility] medical records  [id number]        Your Vitals Were     Pulse Temperature Height Pulse Oximetry BMI (Body Mass Index)       80 98.2  F (36.8  C) (Oral) 5' 3\" (1.6 m) 99% 31 kg/m2        Blood Pressure from Last 3 Encounters:   09/06/18 118/72   09/06/18 127/74   05/29/18 110/69    Weight from Last 3 Encounters:   09/06/18 175 lb (79.4 " kg)   09/06/18 175 lb 3.2 oz (79.5 kg)   05/29/18 178 lb (80.7 kg)                 Today's Medication Changes          These changes are accurate as of 9/6/18  2:57 PM.  If you have any questions, ask your nurse or doctor.               Start taking these medicines.        Dose/Directions    losartan 25 MG tablet   Commonly known as:  COZAAR   Used for:  Microalbuminuria   Started by:  Mckenzie Wood MD        Dose:  25 mg   Take 1 tablet (25 mg) by mouth daily   Quantity:  90 tablet   Refills:  1         Stop taking these medicines if you haven't already. Please contact your care team if you have questions.     lisinopril 5 MG tablet   Commonly known as:  PRINIVIL/ZESTRIL   Stopped by:  Mckenzie Wood MD                Where to get your medicines      These medications were sent to [id number] - [place], [place] - [address]  [address], [facility] [address]     Phone:  [phone]     aspirin 81 MG tablet    losartan 25 MG tablet                Primary Care Provider Office Phone # Fax #    Mckenzie Wood [phone]       [address]  [facility] [address]        Equal Access to Services     [facility]: Hadii cecily alna hadasho Soomaali, waaxda luqadaha, qaybta kaalmada adeegyada, jammie purcell. So [facility] [phone].    ATENCIÓN: Si habla español, tiene a mei disposición servicios gratuitos de asistencia lingüística. Xander al [phone].    We comply with applicable federal civil rights laws and [place] laws. We do not discriminate on the basis of race, color, national origin, age, disability, sex, sexual orientation, or gender identity.            Thank you!     Thank you for choosing [facility]  for your care. Our goal is always to provide you with excellent care. Hearing back from our patients is one way we can continue to improve our services. Please take a few minutes to complete the written survey that you may receive in  the mail after your visit with us. Thank you!             Your Updated Medication List - Protect others around you: Learn how to safely use, store and throw away your medicines at www.disposemymeds.org.          This list is accurate as of 9/6/18  2:57 PM.  Always use your most recent med list.                   Brand Name Dispense Instructions for use Diagnosis    alcohol swab prep pads     100 each    Use to swab area of injection/shelby as directed.    Type 2 diabetes mellitus with complication, without long-term current use of insulin (H)       aspirin 81 MG tablet     90 tablet    Take 1 tablet (81 mg) by mouth daily    Type 2 diabetes mellitus with complication, without long-term current use of insulin (H)       blood glucose monitoring lancets     100 each    Use to test blood sugar 1-2 times daily or as directed.    Type 2 diabetes mellitus without complication, with long-term current use of insulin (H)       blood glucose monitoring test strip    ONETOUCH VERIO IQ    100 each    Use to test blood sugar 1-2 times daily or as directed.    Type 2 diabetes mellitus without complication, with long-term current use of insulin (H)       IBUPROFEN PO      Take 1 tablet by mouth daily as needed for moderate pain        losartan 25 MG tablet    COZAAR    90 tablet    Take 1 tablet (25 mg) by mouth daily    Microalbuminuria       metFORMIN 500 MG 24 hr tablet    GLUCOPHAGE-XR    60 tablet    Take 2 tablets (1,000 mg) by mouth daily (with dinner)    Type 2 diabetes mellitus without complication, with long-term current use of insulin (H)       ONETOUCH VERIO FLEX SYSTEM w/Device Kit     1 kit    1 strip 2 times daily    Type 2 diabetes mellitus without complication, with long-term current use of insulin (H)       TYLENOL PO      Take 1 tablet by mouth daily as needed for mild pain or fever

## 2018-09-06 NOTE — PROGRESS NOTES
SUBJECTIVE/OBJECTIVE:                Norma Arcos is a 47 year old male coming in for a follow-up visit for Medication Therapy Management.  He was referred to me from Dr. Wood. French  present and his son joined us     Chief Complaint: Follow up from our visit on 18 - blood sugars    Tobacco: No tobacco use  Alcohol: none    Medication Adherence/Access: stopped insulin on his own about 3 months ago    Cough:  Still having cough and congestion.  Taking Diabetic Tussin as needed; helps a little bit.  Has not tried stopping the lisinopril as recommended.  Questions if this could be allergies - symptoms triggered by strong smells. Also having watery eyes.  Antihistamine helps a little with the symptoms; not currently taking.    Diabetes:  Pt currently taking metformin XR 1000 mg once daily. Has been off insulin for 3 months - ran out of refills and stopped.  Pt is not experiencing side effects.   SMB time a week.   Ranges (patient log):     Date FBG/ 2hours post Lunch/2hours post Dinner /2hours post    8/6  99     8/5  113     8/4 96      8/3 116      8/ 99      / 105      /  81       88       95      103          Patient is not experiencing hypoglycemia  Recent symptoms of high blood sugar? none  Eye exam: up to date, had 4/10 - found beginning of cataracts and got prescription for eye glasses.   Foot exam: due  Microalbumin is not < 30 mg/g. Pt is taking an ACEi/ARB (lisinopril).  Aspirin: Not taking 81mg daily   Has been watching what he eats and exercising more.    Lab Results   Component Value Date    A1C >15.0 2018     Hypertension: Current medications include lisinopril 5 mg daily.  Patient does not self-monitor BP.  Patient reports current medication side effects - cough.      Current labs include:  Today's Vitals: /74  Pulse 83  Wt 175 lb 3.2 oz (79.5 kg)  BMI 31.04 kg/m2  BP Readings from Last 3 Encounters:   18 110/69   18 114/80   18  103/70     Lab Results   Component Value Date    A1C >15.0 03/26/2018     Liver Function Studies -   Recent Labs   Lab Test  03/26/18   1621   PROTTOTAL  7.9   ALBUMIN  4.1   BILITOTAL  1.0   ALKPHOS  74   AST  17   ALT  19       Lab Results   Component Value Date    UCRR 62 03/26/2018    MICROL 46 03/26/2018    UMALCR 73.28 (H) 03/26/2018       Last Basic Metabolic Panel:  Lab Results   Component Value Date     03/26/2018      Lab Results   Component Value Date    POTASSIUM 4.5 03/26/2018     Lab Results   Component Value Date    CHLORIDE 101 03/26/2018     Lab Results   Component Value Date    BUN 12 03/26/2018     Lab Results   Component Value Date    CR 0.82 03/26/2018     GFR Estimate   Date Value Ref Range Status   03/26/2018 >90 >60 mL/min/1.7m2 Final     Comment:     Non  GFR Calc     GFR Estimate If Black   Date Value Ref Range Status   03/26/2018 >90 >60 mL/min/1.7m2 Final     Comment:      GFR Calc     TSH   Date Value Ref Range Status   03/26/2018 1.75 0.40 - 4.00 mU/L Final     ASSESSMENT:              Current medications were reviewed today as discussed above.      Medication Adherence: issues    Cough:  Unimproved.  May benefit from holding ACE'i to rule out side effect    Diabetes:  Home readings improved greatly.  He is due for A1c recheck.  Pt to restart ASA daily.  Recommend lipid labs - he is not fasting today.    Hypertension: blood pressure at goal. He may benefit from holding ACE'I to see if cough improves  .    PLAN:                  1.  Aspirin 81 mg daily  2.  Talk to Dr. Wood about holding the lisinopril to see if your cough improves  3.  Due for labs    Future Consideration  1.  Statin therapy      I spent 30 minutes with this patient today. All changes were made via verbal approval with Mckenzie Wood. A copy of the visit note was provided to the patient's primary care provider.     Seeing PCP today.  Will follow up in 2 months     The patient was  given a summary of these recommendations as an after visit summary.    Kassidy Craft , Pharm D  [phone] (phone)  [phone] (pager)  Medication Therapy Management Pharmacist

## 2018-09-06 NOTE — PATIENT INSTRUCTIONS
Recommendations from today's MTM visit:                                                      1.  Take aspirin 81 mg daily    2.  Talk to Dr. Wood about holding the lisinopril to see if your cough improves    3.  Due for labs    Future Consideration  1.  Statin therapy    Next MTM visit: 2 months    To schedule another MTM appointment, please call the clinic directly or you may call the MTM scheduling line at [phone] or toll-free at [phone].     My Clinical Pharmacist's contact information:                                                      It was a pleasure seeing you today!  Please feel free to contact me with any questions or concerns you have.      Kassidy Craft , Pharm D  [phone] (phone)  [phone] (pager)  Medication Therapy Management Pharmacist     You may receive a survey about the MTM services you received.  I would appreciate your feedback to help me serve you better in the future. Please fill it out and return it when you can. Your comments will be anonymous.

## 2018-09-06 NOTE — PROGRESS NOTES
ASSESSMENT/PLAN:         1. Type 2 diabetes mellitus without complication, with long-term current use of insulin (H)    Current regimen: Patient only taking metformin 1g daily (previousy     Microalbumin: Present, now on ARB  Foot exam: Good pulses  Eye exam: Eye exam.     Prevention:  Asa started, lipid panel to assess need for lipitor    - Hemoglobin A1c; Future  - Lipid panel reflex to direct LDL Fasting; Future    2. Microalbuminuria    Give his cough, will switch him off lisinopril and cozaar.   - losartan (COZAAR) 25 MG tablet; Take 1 tablet (25 mg) by mouth daily  Dispense: 90 tablet; Refill: 1        Mckenzie Wood MD  [facility]        SUBJECTIVE:   Norma Arcos is a 48 year old male who presents to clinic today for the following health issues:      Diabetes Follow-up    Patient is checking blood sugars: 2-3 times daily.    Blood sugar testing frequency justification: Adjustment of medication(s)  Results are as follows:         Average results are around 110's and this is without insulin now    Diabetic concerns: None     Symptoms of hypoglycemia (low blood sugar): none     Paresthesias (numbness or burning in feet) or sores: No     Date of last diabetic eye exam: Eye exam done within this year.     Patient stopped insulin 3 months ago, insurance was not covering.  He improved his diet.  He is only taking metformin now.      Diabetes Management Resources    Hyperlipidemia Follow-Up      Rate your low fat/cholesterol diet?: fair    Taking statin?  No    Other lipid medications/supplements?:  none    Hypertension Follow-up      Outpatient blood pressures are not being checked.    Low Salt Diet: not monitoring salt    BP Readings from Last 2 Encounters:   09/06/18 127/74   05/29/18 110/69     Hemoglobin A1C (%)   Date Value   03/26/2018 >15.0 (H)       Amount of exercise or physical activity: None    Problems taking medications regularly: No    Medication side effects: possible cough, patient  "believes from lisinopril     Diet: regular (no restrictions)            Problem list and histories reviewed & adjusted, as indicated.  Additional history: as documented    Patient Active Problem List   Diagnosis     Type 2 diabetes mellitus without complication, with long-term current use of insulin (H)     History reviewed. No pertinent surgical history.    Social History   Substance Use Topics     Smoking status: Never Smoker     Smokeless tobacco: Never Used     Alcohol use Yes      Comment: Socially     Family History   Problem Relation Age of Onset     Hypertension Mother      Diabetes Other            Reviewed and updated as needed this visit by clinical staff  Tobacco  Allergies  Meds  Med Hx  Surg Hx  Fam Hx  Soc Hx      Reviewed and updated as needed this visit by Provider         ROS:  Constitutional, HEENT, cardiovascular, pulmonary, gi and gu systems are negative, except as otherwise noted.    OBJECTIVE:     /72 (BP Location: Left arm, Cuff Size: Adult Large)  Pulse 80  Temp 98.2  F (36.8  C) (Oral)  Ht 5' 3\" (1.6 m)  Wt 175 lb (79.4 kg)  SpO2 99%  BMI 31 kg/m2  Body mass index is 31 kg/(m^2).  GENERAL: healthy, alert and no distress  EYES: Eyes grossly normal to inspection, PERRL and conjunctivae and sclerae normal  NECK: no adenopathy, no asymmetry, masses, or scars and thyroid normal to palpation  RESP: lungs clear to auscultation - no rales, rhonchi or wheezes  CV: regular rate and rhythm, normal S1 S2, no S3 or S4, no murmur, click or rub, no peripheral edema and peripheral pulses strong  ABDOMEN: soft, nontender, no hepatosplenomegaly, no masses and bowel sounds normal  MS: no gross musculoskeletal defects noted, no edema          "

## 2018-09-06 NOTE — MR AVS SNAPSHOT
After Visit Summary   9/6/2018    Norma Arcos    MRN: [id number]           Patient Information     Date Of Birth          1970        Visit Information        Provider Department      9/6/2018 1:15 PM Kassidy Craft, [facility]; MULTILINGUAL WORD [facility]        Today's Diagnoses     Type 2 diabetes mellitus with complication, without long-term current use of insulin (H)          Care Instructions    Recommendations from today's MTM visit:                                                      1.  Take aspirin 81 mg daily    2.  Talk to Dr. Wood about holding the lisinopril to see if your cough improves    3.  Due for labs    Future Consideration  1.  Statin therapy    Next MTM visit: 2 months    To schedule another MTM appointment, please call the clinic directly or you may call the MTM scheduling line at [phone] or toll-free at [phone].     My Clinical Pharmacist's contact information:                                                      It was a pleasure seeing you today!  Please feel free to contact me with any questions or concerns you have.      Kassidy Craft , Pharm D  [phone] (phone)  [phone] (pager)  Medication Therapy Management Pharmacist     You may receive a survey about the MTM services you received.  I would appreciate your feedback to help me serve you better in the future. Please fill it out and return it when you can. Your comments will be anonymous.                      Follow-ups after your visit        Your next 10 appointments already scheduled     Sep 06, 2018  2:00 PM CDT   SHORT with Mckenzie Wood MD   [facility]    [facility] [address]  [facility] [id number] [phone]              Who to contact     If you have questions or need follow up information about today's clinic visit or your schedule please contact [facility] directly at [phone].  Normal or non-critical lab and  imaging results will be communicated to you by MyChart, letter or phone within 4 business days after the clinic has received the results. If you do not hear from us within 7 days, please contact the clinic through MyChart or phone. If you have a critical or abnormal lab result, we will notify you by phone as soon as possible.  Submit refill requests through [employer] or call your pharmacy and they will forward the refill request to us. Please allow 3 business days for your refill to be completed.          Additional Information About Your Visit        Care EveryWhere ID     This is your Care EveryWhere ID. This could be used by other organizations to access your [facility] medical records  [id number]        Your Vitals Were     Pulse BMI (Body Mass Index)                83 31.04 kg/m2           Blood Pressure from Last 3 Encounters:   09/06/18 127/74   05/29/18 110/69   05/01/18 114/80    Weight from Last 3 Encounters:   09/06/18 175 lb 3.2 oz (79.5 kg)   05/29/18 178 lb (80.7 kg)   05/01/18 174 lb (78.9 kg)              Today, you had the following     No orders found for display         Where to get your medicines      These medications were sent to [id number] - [place], [place] - [address]  [address], [facility] [address]     Phone:  [phone]     aspirin 81 MG tablet          Primary Care Provider Office Phone # Fax #    Mckenzie Wood [phone]       [address]  [facility] [address]        Equal Access to Services     BRIDGETTE MACK : Hadii aad ku hadasho Soomaali, waaxda luqadaha, qaybta kaalmada adeegyada, jammie purcell. So [facility] [phone].    ATENCIÓN: Si habla español, tiene a mei disposición servicios gratuitos de asistencia lingüística. Llame al [phone].    We comply with applicable federal civil rights laws and [place] laws. We do not discriminate on the basis of race, color, national origin, age, disability, sex, sexual  orientation, or gender identity.            Thank you!     Thank you for choosing [facility]  for your care. Our goal is always to provide you with excellent care. Hearing back from our patients is one way we can continue to improve our services. Please take a few minutes to complete the written survey that you may receive in the mail after your visit with us. Thank you!             Your Updated Medication List - Protect others around you: Learn how to safely use, store and throw away your medicines at www.disposemymeds.org.          This list is accurate as of 9/6/18  1:51 PM.  Always use your most recent med list.                   Brand Name Dispense Instructions for use Diagnosis    alcohol swab prep pads     100 each    Use to swab area of injection/shelby as directed.    Type 2 diabetes mellitus with complication, without long-term current use of insulin (H)       aspirin 81 MG tablet     90 tablet    Take 1 tablet (81 mg) by mouth daily    Type 2 diabetes mellitus with complication, without long-term current use of insulin (H)       blood glucose monitoring lancets     100 each    Use to test blood sugar 1-2 times daily or as directed.    Type 2 diabetes mellitus without complication, with long-term current use of insulin (H)       blood glucose monitoring test strip    ONETOUCH VERIO IQ    100 each    Use to test blood sugar 1-2 times daily or as directed.    Type 2 diabetes mellitus without complication, with long-term current use of insulin (H)       IBUPROFEN PO      Take 1 tablet by mouth daily as needed for moderate pain        lisinopril 5 MG tablet    PRINIVIL/ZESTRIL    30 tablet    Take 1 tablet (5 mg) by mouth daily    Type 2 diabetes mellitus with complication, without long-term current use of insulin (H)       metFORMIN 500 MG 24 hr tablet    GLUCOPHAGE-XR    60 tablet    Take 2 tablets (1,000 mg) by mouth daily (with dinner)    Type 2 diabetes mellitus without complication, with  long-term current use of insulin (H)       ONETOUCH VERIO FLEX SYSTEM w/Device Kit     1 kit    1 strip 2 times daily    Type 2 diabetes mellitus without complication, with long-term current use of insulin (H)       TYLENOL PO      Take 1 tablet by mouth daily as needed for mild pain or fever

## 2018-09-20 ENCOUNTER — TELEPHONE (OUTPATIENT)
Dept: PHARMACY | Facility: CLINIC | Age: 48
End: 2018-09-20

## 2018-09-20 NOTE — TELEPHONE ENCOUNTER
Called patient to remind him he is due for labs.  He plans to go to the [facility].    Asking for a letter from Dr. Wood notifying his new boss that he has diabetes.  Will forward this request to her.

## 2018-09-20 NOTE — LETTER
September 20, 2018      Foli A Isrrael  [address] [facility] [address]        To Whom It May Concern,      I am the primary care doctor for Mr. Arcos. He is undergoing treatment for diabetes with follow up scheduled          Sincerely,      Mckenzie Wood MD

## 2018-10-01 DIAGNOSIS — Z79.4 TYPE 2 DIABETES MELLITUS WITHOUT COMPLICATION, WITH LONG-TERM CURRENT USE OF INSULIN (H): ICD-10-CM

## 2018-10-01 DIAGNOSIS — E11.9 TYPE 2 DIABETES MELLITUS WITHOUT COMPLICATION, WITH LONG-TERM CURRENT USE OF INSULIN (H): ICD-10-CM

## 2018-10-01 RX ORDER — METFORMIN HCL 500 MG
1000 TABLET, EXTENDED RELEASE 24 HR ORAL
Qty: 60 TABLET | Refills: 0 | Status: SHIPPED | OUTPATIENT
Start: 2018-10-01 | End: 2018-10-23

## 2018-10-23 ENCOUNTER — OFFICE VISIT (OUTPATIENT)
Dept: PHARMACY | Facility: CLINIC | Age: 48
End: 2018-10-23
Payer: COMMERCIAL

## 2018-10-23 VITALS
DIASTOLIC BLOOD PRESSURE: 80 MMHG | HEART RATE: 76 BPM | SYSTOLIC BLOOD PRESSURE: 118 MMHG | BODY MASS INDEX: 31.18 KG/M2 | WEIGHT: 176 LBS

## 2018-10-23 DIAGNOSIS — R05.3 CHRONIC COUGH: ICD-10-CM

## 2018-10-23 DIAGNOSIS — Z79.4 TYPE 2 DIABETES MELLITUS WITHOUT COMPLICATION, WITH LONG-TERM CURRENT USE OF INSULIN (H): ICD-10-CM

## 2018-10-23 DIAGNOSIS — E11.9 TYPE 2 DIABETES MELLITUS WITHOUT COMPLICATION, WITH LONG-TERM CURRENT USE OF INSULIN (H): ICD-10-CM

## 2018-10-23 DIAGNOSIS — R80.9 MICROALBUMINURIA: ICD-10-CM

## 2018-10-23 LAB — HBA1C MFR BLD: 6 % (ref 0–5.6)

## 2018-10-23 PROCEDURE — 36415 COLL VENOUS BLD VENIPUNCTURE: CPT | Performed by: INTERNAL MEDICINE

## 2018-10-23 PROCEDURE — 99607 MTMS BY PHARM ADDL 15 MIN: CPT | Performed by: PHARMACIST

## 2018-10-23 PROCEDURE — 83036 HEMOGLOBIN GLYCOSYLATED A1C: CPT | Performed by: INTERNAL MEDICINE

## 2018-10-23 PROCEDURE — 99606 MTMS BY PHARM EST 15 MIN: CPT | Performed by: PHARMACIST

## 2018-10-23 PROCEDURE — 80061 LIPID PANEL: CPT | Performed by: INTERNAL MEDICINE

## 2018-10-23 RX ORDER — METFORMIN HCL 500 MG
1000 TABLET, EXTENDED RELEASE 24 HR ORAL
Qty: 60 TABLET | Refills: 0 | Status: SHIPPED | OUTPATIENT
Start: 2018-10-23 | End: 2018-12-03

## 2018-10-23 RX ORDER — BENZONATATE 200 MG/1
200 CAPSULE ORAL 3 TIMES DAILY PRN
Qty: 21 CAPSULE | Refills: 0 | Status: CANCELLED | OUTPATIENT
Start: 2018-10-23

## 2018-10-23 NOTE — PROGRESS NOTES
SUBJECTIVE/OBJECTIVE:                Norma Arcos is a 47 year old male coming in for a follow-up visit for Medication Therapy Management.  He was referred to me from Dr. Wood. French  not present (was scheduled).    Chief Complaint: Follow up from our visit on 18 - blood sugars    Tobacco: No tobacco use  Alcohol: none    Medication Adherence/Access: taking maintenance medications prn - see below.       HA/Cough:  Taking ibuprofen as needed. Pt has tightness and pain around his head. Worsened by light. Pt denied recent eye check. Ibuprofen somewhat helps. Still having cough, requesting refill of Tessalon.  Stopped the lisinopril as recommended.    Diabetes:  Pt currently taking metformin XR 1000 mg once daily. Needs refill of metformin today. Was off insulin for 3 months - ran out of refills and stopped.  Pt is not experiencing side effects.   SMB day.   Ranges (patient log):     Date FBG/ 2hours post Lunch/2hours post Dinner /2hours post    10/1 98      10/2 109      10/3 128      10/4  89     10/6 108      10/7  110     10/8 106      10/9  108     10/11  95     10/12 103      10/13 109      10/14  105     10/16 95      10/17  101     10/18  96     10/20  75     10/21  100     10/22  91     10/22 101          Patient is not experiencing hypoglycemia  Recent symptoms of high blood sugar? none  Eye exam: up to date, had 4/10 - found beginning of cataracts and got prescription for eye glasses.   Foot exam: due  Microalbumin is not < 30 mg/g. Pt is taking an ACEi/ARB (lisinopril).  Aspirin: Taking 81mg daily PRN  Has been watching what he eats and exercising more.  Lab Results   Component Value Date    A1C 6.0 10/23/2018    A1C >15.0 2018       The 10-year ASCVD risk score ([place] NITA Jr, et al., 2013) is: 10.7%    Values used to calculate the score:      Age: 48 years      Sex: Male      Is Non- : Yes      Diabetic: Yes      Tobacco smoker: No      Systolic Blood  Pressure: 118 mmHg      Is BP treated: Yes      HDL Cholesterol: 50 mg/dL      Total Cholesterol: 158 mg/dL      Hypertension: Current medications include losartan 25 mg prn.  Patient does not self-monitor BP.  Patient reports no current medication side effects.    BP Readings from Last 3 Encounters:   10/23/18 118/80   09/06/18 118/72   09/06/18 127/74       Current labs include:  Today's Vitals: /80  Pulse 76    Liver Function Studies -   Recent Labs   Lab Test  03/26/18   1621   PROTTOTAL  7.9   ALBUMIN  4.1   BILITOTAL  1.0   ALKPHOS  74   AST  17   ALT  19       Lab Results   Component Value Date    UCRR 62 03/26/2018    MICROL 46 03/26/2018    UMALCR 73.28 (H) 03/26/2018       Last Basic Metabolic Panel:  Lab Results   Component Value Date     03/26/2018      Lab Results   Component Value Date    POTASSIUM 4.5 03/26/2018     Lab Results   Component Value Date    CHLORIDE 101 03/26/2018     Lab Results   Component Value Date    BUN 12 03/26/2018     Lab Results   Component Value Date    CR 0.82 03/26/2018     GFR Estimate   Date Value Ref Range Status   03/26/2018 >90 >60 mL/min/1.7m2 Final     Comment:     Non  GFR Calc     GFR Estimate If Black   Date Value Ref Range Status   03/26/2018 >90 >60 mL/min/1.7m2 Final     Comment:      GFR Calc     TSH   Date Value Ref Range Status   03/26/2018 1.75 0.40 - 4.00 mU/L Final     There is no immunization history for the selected administration types on file for this patient.    ASSESSMENT:              Current medications were reviewed today as discussed above.      Medication Adherence: issues    HA/Cough:  Needs improvement. Pt would benefit from rechecking eyes if have not been seen for awhile as HA related to light sensitivity. Consistent daily adherence to BP medications and ASA may also help with HA. Pt would benefit from seeing PCP if HA and/or cough continues to be problematic.     Diabetes: Improved. Patient is  meeting A1c goal of < 7% and SMBG goal of FBG  and PPBG < 180. Home readings improved greatly. Pt to restart ASA daily.  Recommend influenza vaccine today. Pending FLP.     Hypertension: Improved, but still needs improvement. Patient is meeting BP goal of < 140/90mmHg. Pt is taking as needed, would benefit from taking daily for consistent control.     PLAN:                  1. Labs - I will call with .     2. MTM sent refill for metformin. Tessalon pended for PCP refill.    3. Pt to take losartan daily.    4. If headache and/or cough worsens, make an appointment to see PCP.     5. Pt to get flu vaccine at pharmacy.       I spent 30 minutes with this patient today. All changes were made via verbal approval with Mckenzie Wood. A copy of the visit note was provided to the patient's primary care provider.       Will follow up in 3 months     The patient was given a summary of these recommendations as an after visit summary.    Kassidy Craft , Pharm D  [phone] (phone)  [phone] (pager)  Medication Therapy Management Pharmacist

## 2018-10-23 NOTE — MR AVS SNAPSHOT
After Visit Summary   10/23/2018    Norma Arcos    MRN: [id number]           Patient Information     Date Of Birth          1970        Visit Information        Provider Department      10/23/2018 12:00 PM Kassidy Craft, [facility]; MULTILINGUAL WORD [facility]        Today's Diagnoses     Microalbuminuria        Type 2 diabetes mellitus without complication, with long-term current use of insulin (H)        Chronic cough          Care Instructions    Recommendations from today's MTM visit:                                                        1. We sent a refill of the metformin for diabetes and Tessalon for cough.    2. Take the losartan every day.    3. If you headache does not get better, please make an appointment to see Dr. Wood.     4. If you have not have checked your eyes this year, please make an appointment to get your eyes checked.    5. I will call you when your labs are back.     Next MTM visit: 3 months    To schedule another MTM appointment, please call the clinic directly or you may call the MTM scheduling line at [phone] or toll-free at [phone].     My Clinical Pharmacist's contact information:                                                      It was a pleasure seeing you today!  Please feel free to contact me with any questions or concerns you have.      Kassidy Craft , Pharm D  [phone] (phone)  [phone] (pager)  Medication Therapy Management Pharmacist    You may receive a survey about the MTM services you received.  I would appreciate your feedback to help me serve you better in the future. Please fill it out and return it when you can. Your comments will be anonymous.                    Follow-ups after your visit        Follow-up notes from your care team     Return in about 3 months (around 1/23/2019) for Medication Therapy Management Visit.      Who to contact     If you have questions or need follow up information about today's clinic visit  or your schedule please contact [facility] directly at [phone].  Normal or non-critical lab and imaging results will be communicated to you by MyChart, letter or phone within 4 business days after the clinic has received the results. If you do not hear from us within 7 days, please contact the clinic through MyChart or phone. If you have a critical or abnormal lab result, we will notify you by phone as soon as possible.  Submit refill requests through [facility] or call your pharmacy and they will forward the refill request to us. Please allow 3 business days for your refill to be completed.          Additional Information About Your Visit        Care EveryWhere ID     This is your Care EveryWhere ID. This could be used by other organizations to access your [place] medical records  [id number]        Your Vitals Were     Pulse                   76            Blood Pressure from Last 3 Encounters:   10/23/18 118/80   09/06/18 118/72   09/06/18 127/74    Weight from Last 3 Encounters:   09/06/18 175 lb (79.4 kg)   09/06/18 175 lb 3.2 oz (79.5 kg)   05/29/18 178 lb (80.7 kg)              Today, you had the following     No orders found for display         Where to get your medicines      These medications were sent to [id number] - [place], [place] - [address]  [address], [facility] [address]     Phone:  [phone]     metFORMIN 500 MG 24 hr tablet          Primary Care Provider Office Phone # Fax #    Mckenzie Wood [phone]       [address]  [facility] [address]        Equal Access to Services     [facility]: Hadii aad ku hadasho Soomaali, waaxda luqadaha, qaybta kaalmada jammie saravia . So [facility] [phone].    ATENCIÓN: Si habla español, tiene a mei disposición servicios gratuitos de asistencia lingüística. Llame al [phone].    We comply with applicable federal civil rights laws and [place] laws. We do not  discriminate on the basis of race, color, national origin, age, disability, sex, sexual orientation, or gender identity.            Thank you!     Thank you for choosing [facility] [place]  for your care. Our goal is always to provide you with excellent care. Hearing back from our patients is one way we can continue to improve our services. Please take a few minutes to complete the written survey that you may receive in the mail after your visit with us. Thank you!             Your Updated Medication List - Protect others around you: Learn how to safely use, store and throw away your medicines at www.disposemymeds.org.          This list is accurate as of 10/23/18 12:06 PM.  Always use your most recent med list.                   Brand Name Dispense Instructions for use Diagnosis    alcohol swab prep pads     100 each    Use to swab area of injection/shelby as directed.    Type 2 diabetes mellitus with complication, without long-term current use of insulin (H)       aspirin 81 MG tablet     90 tablet    Take 1 tablet (81 mg) by mouth daily    Type 2 diabetes mellitus with complication, without long-term current use of insulin (H)       benzonatate 200 MG capsule    TESSALON    21 capsule    Take 1 capsule (200 mg) by mouth 3 times daily as needed for cough    Chronic cough       blood glucose monitoring lancets     100 each    Use to test blood sugar 1-2 times daily or as directed.    Type 2 diabetes mellitus without complication, with long-term current use of insulin (H)       blood glucose monitoring test strip    ONETOUCH VERIO IQ    100 each    Use to test blood sugar 1-2 times daily or as directed.    Type 2 diabetes mellitus without complication, with long-term current use of insulin (H)       IBUPROFEN PO      Take 1 tablet by mouth daily as needed for moderate pain        losartan 25 MG tablet    COZAAR    90 tablet    Take 1 tablet (25 mg) by mouth daily    Microalbuminuria       metFORMIN 500 MG 24 hr  tablet    GLUCOPHAGE-XR    60 tablet    Take 2 tablets (1,000 mg) by mouth daily (with dinner)    Type 2 diabetes mellitus without complication, with long-term current use of insulin (H)       goCatch VERIO FLEX SYSTEM w/Device Kit     1 kit    1 strip 2 times daily    Type 2 diabetes mellitus without complication, with long-term current use of insulin (H)       TYLENOL PO      Take 1 tablet by mouth daily as needed for mild pain or fever

## 2018-10-23 NOTE — PATIENT INSTRUCTIONS
Recommendations from today's MTM visit:                                                        1. We sent a refill of the metformin for diabetes and Tessalon for cough.    2. Take the losartan every day.    3. If your headache does not get better, please make an appointment to see Dr. Wood.     4. If you have not have checked your eyes this year, please make an appointment to get your eyes checked.    5. I will call you when your labs are back.     Next MTM visit: 3 months    To schedule another MTM appointment, please call the clinic directly or you may call the MTM scheduling line at [phone] or toll-free at [phone].     My Clinical Pharmacist's contact information:                                                      It was a pleasure seeing you today!  Please feel free to contact me with any questions or concerns you have.      Kassidy Craft , Pharm D  [phone] (phone)  [phone] (pager)  Medication Therapy Management Pharmacist    You may receive a survey about the MTM services you received.  I would appreciate your feedback to help me serve you better in the future. Please fill it out and return it when you can. Your comments will be anonymous.

## 2018-10-24 LAB
CHOLEST SERPL-MCNC: 158 MG/DL
HDLC SERPL-MCNC: 50 MG/DL
LDLC SERPL CALC-MCNC: 92 MG/DL
NONHDLC SERPL-MCNC: 108 MG/DL
TRIGL SERPL-MCNC: 78 MG/DL

## 2018-10-24 RX ORDER — BENZONATATE 200 MG/1
200 CAPSULE ORAL 3 TIMES DAILY PRN
Qty: 21 CAPSULE | Refills: 0 | Status: SHIPPED | OUTPATIENT
Start: 2018-10-24 | End: 2019-03-18

## 2018-10-29 ENCOUNTER — TELEPHONE (OUTPATIENT)
Dept: PHARMACY | Facility: CLINIC | Age: 48
End: 2018-10-29

## 2018-10-29 DIAGNOSIS — R80.9 MICROALBUMINURIA: ICD-10-CM

## 2018-10-29 DIAGNOSIS — E11.8 TYPE 2 DIABETES MELLITUS WITH COMPLICATION, WITHOUT LONG-TERM CURRENT USE OF INSULIN (H): ICD-10-CM

## 2018-10-29 DIAGNOSIS — E11.9 TYPE 2 DIABETES MELLITUS WITHOUT COMPLICATION, WITH LONG-TERM CURRENT USE OF INSULIN (H): Primary | ICD-10-CM

## 2018-10-29 DIAGNOSIS — Z79.4 TYPE 2 DIABETES MELLITUS WITHOUT COMPLICATION, WITH LONG-TERM CURRENT USE OF INSULIN (H): Primary | ICD-10-CM

## 2018-10-29 RX ORDER — ATORVASTATIN CALCIUM 40 MG/1
40 TABLET, FILM COATED ORAL DAILY
Qty: 30 TABLET | Refills: 1 | Status: SHIPPED | OUTPATIENT
Start: 2018-10-29 | End: 2019-06-06

## 2018-10-29 NOTE — TELEPHONE ENCOUNTER
----- Message from Mckenzie Wood MD sent at 10/29/2018  9:29 AM CDT -----  Regarding: RE: lipids/statin  Dear Kassidy!    Yes I was waiting for his lipid panel and yes he definitely has an indication for a statin.  Given elevated ASCVD score let's start him on lipitor 40mg or its crestor equivalent    Thank you for the reminder!    Mckenzie    ----- Message -----     From: Kassidy Craft, [facility]     Sent: 10/29/2018   8:25 AM       To: Mckenzie Wood MD  Subject: lipids/statin                                    Hi Dr. Wood,    Sorry if I missed it but did you have a recommendation on a statin for Foli? His 10-yr risk is 10.7% with diabetes.  Currently not on a statin.    Thanks  Kassidy

## 2018-10-29 NOTE — TELEPHONE ENCOUNTER
Called patient to review labs and Dr. Wood's recommendation.  Left message with an  today.  Order for atorvastatin 40 mg sent to his pharmacy today.

## 2018-11-12 RX ORDER — LOSARTAN POTASSIUM 25 MG/1
25 TABLET ORAL DAILY
Qty: 90 TABLET | Refills: 1 | Status: SHIPPED | OUTPATIENT
Start: 2018-11-12 | End: 2019-05-09

## 2018-11-12 NOTE — TELEPHONE ENCOUNTER
Patient called asking for refill on ASA and losartan.  Called back with  to let him know we got the request and to discuss the atorvastatin prescription. Left message for patient.

## 2018-12-03 DIAGNOSIS — E11.9 TYPE 2 DIABETES MELLITUS WITHOUT COMPLICATION, WITH LONG-TERM CURRENT USE OF INSULIN (H): ICD-10-CM

## 2018-12-03 DIAGNOSIS — Z79.4 TYPE 2 DIABETES MELLITUS WITHOUT COMPLICATION, WITH LONG-TERM CURRENT USE OF INSULIN (H): ICD-10-CM

## 2018-12-03 RX ORDER — METFORMIN HCL 500 MG
1000 TABLET, EXTENDED RELEASE 24 HR ORAL
Qty: 60 TABLET | Refills: 3 | Status: SHIPPED | OUTPATIENT
Start: 2018-12-03 | End: 2019-05-09

## 2018-12-03 NOTE — TELEPHONE ENCOUNTER
Patient called to request metformin refilled.    Asked patient if he picked-up and started atorvastatin as prescribed.  He said he took twice and then stopped because he felt better.  Discussed that this should be continued daily for his cholesterol and heart health - he will resume daily dosing.    Kassidy Craft , Pharm D  [phone] (phone)  [phone] (pager)  Medication Therapy Management Pharmacist

## 2018-12-27 ENCOUNTER — TELEPHONE (OUTPATIENT)
Dept: PHARMACY | Facility: CLINIC | Age: 48
End: 2018-12-27

## 2018-12-27 NOTE — TELEPHONE ENCOUNTER
Patient called requesting refills on metformin and ASA - he has refills of both at the pharmacy. Also reporting stopping losartan because he got a cough from that just like he did with lisinopril; cough improved when stopped.  He plans to schedule follow-up with Dr. Wood in January.      Kassidy Craft , Pharm D  [phone] (phone)  [phone] (pager)  Medication Therapy Management Pharmacist

## 2019-02-04 ENCOUNTER — TELEPHONE (OUTPATIENT)
Dept: PHARMACY | Facility: CLINIC | Age: 49
End: 2019-02-04

## 2019-02-04 NOTE — TELEPHONE ENCOUNTER
Spoke to pt with  services (Chinese) regarding scheduling a MTM visit. Pt was very tired from recent move. Asked for me to call back next week to schedule a visit.     Alena Toth PharmD  Pharmaceutical Care Resident  Pager: [phone]

## 2019-03-11 ENCOUNTER — TELEPHONE (OUTPATIENT)
Dept: PHARMACY | Facility: CLINIC | Age: 49
End: 2019-03-11

## 2019-03-11 DIAGNOSIS — E11.9 TYPE 2 DIABETES MELLITUS WITHOUT COMPLICATION, WITH LONG-TERM CURRENT USE OF INSULIN (H): ICD-10-CM

## 2019-03-11 DIAGNOSIS — E11.8 TYPE 2 DIABETES MELLITUS WITH COMPLICATION, WITHOUT LONG-TERM CURRENT USE OF INSULIN (H): ICD-10-CM

## 2019-03-11 DIAGNOSIS — Z79.4 TYPE 2 DIABETES MELLITUS WITHOUT COMPLICATION, WITH LONG-TERM CURRENT USE OF INSULIN (H): ICD-10-CM

## 2019-03-18 ENCOUNTER — OFFICE VISIT (OUTPATIENT)
Dept: PHARMACY | Facility: CLINIC | Age: 49
End: 2019-03-18
Payer: COMMERCIAL

## 2019-03-18 VITALS — WEIGHT: 174.9 LBS | BODY MASS INDEX: 30.98 KG/M2 | SYSTOLIC BLOOD PRESSURE: 112 MMHG | DIASTOLIC BLOOD PRESSURE: 77 MMHG

## 2019-03-18 DIAGNOSIS — Z79.4 TYPE 2 DIABETES MELLITUS WITHOUT COMPLICATION, WITH LONG-TERM CURRENT USE OF INSULIN (H): Primary | ICD-10-CM

## 2019-03-18 DIAGNOSIS — I10 ESSENTIAL HYPERTENSION: ICD-10-CM

## 2019-03-18 DIAGNOSIS — R05.3 CHRONIC COUGH: ICD-10-CM

## 2019-03-18 DIAGNOSIS — E78.5 HYPERLIPIDEMIA LDL GOAL <100: ICD-10-CM

## 2019-03-18 DIAGNOSIS — E11.9 TYPE 2 DIABETES MELLITUS WITHOUT COMPLICATION, WITH LONG-TERM CURRENT USE OF INSULIN (H): Primary | ICD-10-CM

## 2019-03-18 PROCEDURE — 99607 MTMS BY PHARM ADDL 15 MIN: CPT | Performed by: PHARMACIST

## 2019-03-18 PROCEDURE — 99605 MTMS BY PHARM NP 15 MIN: CPT | Performed by: PHARMACIST

## 2019-03-18 NOTE — PROGRESS NOTES
SUBJECTIVE/OBJECTIVE:                Norma Arcos is a 47 year old male coming in for a follow-up visit for Medication Therapy Management.  He was referred to me from Dr. Wood. An  and his son joined us today.  First visit in 2019.    Chief Complaint: Follow up from our visit on 10/23/18 - blood sugars    Tobacco: No tobacco use  Alcohol: none    Medication Adherence/Access: taking maintenance medications prn - see below.     HA/Cough:  Cough and headache have resolved.  No therapy needed at this time.    Diabetes:  Pt currently taking metformin XR 1000 mg once daily.   SMB day.   Ranges (patient log):   Blood sugar during the visit was 180 at 1pm; ate last this morning at 7am.    Date FBG/ 2hours post Lunch/2hours post Dinner /2hours post    3/17  125     3/5  120       100       112      116      /16  107     /  103     2/  112     2/7  88       Patient is not experiencing hypoglycemia  Recent symptoms of high blood sugar? none  Eye exam: up to date, had 4/10 - found beginning of cataracts and got prescription for eye glasses.   Foot exam: due  Microalbumin is not < 30 mg/g. Pt is taking an ACEi/ARB (lisinopril).  Aspirin: Taking 81mg daily   Has been watching what he eats and exercising more.  Getting to the gym 1-2 times per week, has bike and weights at home.  Lab Results   Component Value Date    A1C 6.0 10/23/2018    A1C >15.0 2018       Hypertension: Current medications include  None.  He has been off losartan for past month; was told by pharmacy that he should stop but can't recall why.    Patient does not self-monitor BP.      BP Readings from Last 3 Encounters:   10/23/18 118/80   18 118/72   18 127/74       Hyperlipidemia: Current therapy includes atorvastatin 40 mg once daily - didn't know it was for every day use.  Pt reports no significant myalgias or other side effects.  The 10-year ASCVD risk score (Robert NITA Rivas., et al., 2013) is: 10.7%    Values  used to calculate the score:      Age: 48 years      Sex: Male      Is Non- : Yes      Diabetic: Yes      Tobacco smoker: No      Systolic Blood Pressure: 118 mmHg      Is BP treated: Yes      HDL Cholesterol: 50 mg/dL      Total Cholesterol: 158 mg/dL      Current labs include:  Today's Vitals: /77   Wt 174 lb 14.4 oz (79.3 kg)   BMI 30.98 kg/m      Liver Function Studies -   Recent Labs   Lab Test  03/26/18   1621   PROTTOTAL  7.9   ALBUMIN  4.1   BILITOTAL  1.0   ALKPHOS  74   AST  17   ALT  19       Lab Results   Component Value Date    UCRR 62 03/26/2018    MICROL 46 03/26/2018    UMALCR 73.28 (H) 03/26/2018       Last Basic Metabolic Panel:  Lab Results   Component Value Date     03/26/2018      Lab Results   Component Value Date    POTASSIUM 4.5 03/26/2018     Lab Results   Component Value Date    CHLORIDE 101 03/26/2018     Lab Results   Component Value Date    BUN 12 03/26/2018     Lab Results   Component Value Date    CR 0.82 03/26/2018     GFR Estimate   Date Value Ref Range Status   03/26/2018 >90 >60 mL/min/1.7m2 Final     Comment:     Non  GFR Calc     GFR Estimate If Black   Date Value Ref Range Status   03/26/2018 >90 >60 mL/min/1.7m2 Final     Comment:      GFR Calc     TSH   Date Value Ref Range Status   03/26/2018 1.75 0.40 - 4.00 mU/L Final     There is no immunization history for the selected administration types on file for this patient.    ASSESSMENT:              Current medications were reviewed today as discussed above.      Medication Adherence: issues    HA/Cough:  resolved    Diabetes: Improved. Patient is meeting A1c goal of < 7% and SMBG goal of FBG  and PPBG < 180. Home readings improved greatly. Pt to establish with new provider.    Hyperlipidemia: Needs Improvement. Recommend taking statin daily.     Hypertension: stable. Not on therapy at this time.  Recommend rechecking labs to see if ARB still indicated  for microalbuminuria.  His cough has resolved off therapy so possibly was having a medication side effect.    PLAN:                  1.  Atorvastatin daily  2.  Establish with new provider and due for labs - he was not able to schedule today due to new job schedule      I spent 30 minutes with this patient today. All changes were made via verbal approval with Mckenzie Wood. A copy of the visit note was provided to the patient's primary care provider.       Will follow up in 3 months     The patient was given a summary of these recommendations as an after visit summary.    Kassidy Craft , Pharm D  [phone] (phone)  [phone] (pager)  Medication Therapy Management Pharmacist

## 2019-03-18 NOTE — PATIENT INSTRUCTIONS
Recommendations from today's MTM visit:                                                    MTM (medication therapy management) is a service provided by a clinical pharmacist designed to help you get the most of out of your medicines.   Today we reviewed what your medicines are for, how to know if they are working, that your medicines are safe and how to make your medicine regimen as easy as possible.     1. Take atorvastatin every day    2. Schedule with Dr. Tolbert to establish care and recheck labs in April or May      Next MTM visit: 3 months    To schedule another MTM appointment, please call the clinic directly or you may call the MTM scheduling line at [phone] or toll-free at [phone].     My Clinical Pharmacist's contact information:                                                      It was a pleasure talking with you today!  Please feel free to contact me with any questions or concerns you have.      Kassidy Craft , Pharm D  [phone] (phone)  [phone] (pager)  Medication Therapy Management Pharmacist     You may receive a survey about the MTM services you received by email and/or US Mail.  I would appreciate your feedback to help me serve you better in the future. Your comments will be anonymous.

## 2019-04-03 ENCOUNTER — OFFICE VISIT (OUTPATIENT)
Dept: INTERNAL MEDICINE | Facility: CLINIC | Age: 49
End: 2019-04-03
Payer: COMMERCIAL

## 2019-04-03 VITALS
HEIGHT: 63 IN | BODY MASS INDEX: 30.95 KG/M2 | OXYGEN SATURATION: 98 % | TEMPERATURE: 98.4 F | SYSTOLIC BLOOD PRESSURE: 129 MMHG | DIASTOLIC BLOOD PRESSURE: 88 MMHG | HEART RATE: 76 BPM | RESPIRATION RATE: 18 BRPM | WEIGHT: 174.7 LBS

## 2019-04-03 DIAGNOSIS — E11.9 TYPE 2 DIABETES MELLITUS WITHOUT COMPLICATION, WITH LONG-TERM CURRENT USE OF INSULIN (H): Primary | ICD-10-CM

## 2019-04-03 DIAGNOSIS — Z79.4 TYPE 2 DIABETES MELLITUS WITHOUT COMPLICATION, WITH LONG-TERM CURRENT USE OF INSULIN (H): Primary | ICD-10-CM

## 2019-04-03 PROCEDURE — 99213 OFFICE O/P EST LOW 20 MIN: CPT | Performed by: FAMILY MEDICINE

## 2019-04-03 ASSESSMENT — MIFFLIN-ST. JEOR: SCORE: 1567.56

## 2019-04-04 NOTE — PROGRESS NOTES
"  CHIEF COMPLAINT    Diabetes  Form for drivers license      HISTORY    Patient DX early 2018. He is on oral meds. Last favorable .    He has never had LOC or presyncope.       Patient Active Problem List   Diagnosis     Type 2 diabetes mellitus without complication, with long-term current use of insulin (H)     Current Outpatient Medications   Medication Sig Dispense Refill     alcohol swab prep pads Use to swab area of injection/shelby as directed. 100 each 3     aspirin (ASA) 81 MG tablet Take 1 tablet (81 mg) by mouth daily 90 tablet 3     atorvastatin (LIPITOR) 40 MG tablet Take 1 tablet (40 mg) by mouth daily 30 tablet 1     blood glucose (ONETOUCH VERIO IQ) test strip Use to test blood sugar 1-2 times daily or as directed. 100 each 11     blood glucose monitoring (ONE TOUCH DELICA) lancets Use to test blood sugar 1-2 times daily or as directed. 100 each 11     Blood Glucose Monitoring Suppl (ONETOUCH VERIO FLEX SYSTEM) W/DEVICE KIT 1 strip 2 times daily 1 kit 0     IBUPROFEN PO Take 1 tablet by mouth daily as needed for moderate pain       losartan (COZAAR) 25 MG tablet Take 1 tablet (25 mg) by mouth daily 90 tablet 1     metFORMIN (GLUCOPHAGE-XR) 500 MG 24 hr tablet Take 2 tablets (1,000 mg) by mouth daily (with dinner) 60 tablet 3         REVIEW OF SYSTEMS    Weight stable  No SOB  No CP, palpitation, syncope  No abd pain  No weakness or numbness      History reviewed. No pertinent past medical history.       EXAM  /88 (BP Location: Left arm, Patient Position: Sitting, Cuff Size: Adult Large)   Pulse 76   Temp 98.4  F (36.9  C) (Oral)   Resp 18   Ht 1.6 m (5' 3\")   Wt 79.2 kg (174 lb 11.2 oz)   SpO2 98%   BMI 30.95 kg/m            (E11.9,  Z79.4) Type 2 diabetes mellitus without complication, with long-term current use of insulin (H)  (primary encounter diagnosis)  Comment:   Form completed. I do not see need for driving restriction. Re evaluate in 1 year.  Plan: F/U Aug 2-19.      "

## 2019-05-09 DIAGNOSIS — Z79.4 TYPE 2 DIABETES MELLITUS WITHOUT COMPLICATION, WITH LONG-TERM CURRENT USE OF INSULIN (H): ICD-10-CM

## 2019-05-09 DIAGNOSIS — R80.9 MICROALBUMINURIA: ICD-10-CM

## 2019-05-09 DIAGNOSIS — E11.9 TYPE 2 DIABETES MELLITUS WITHOUT COMPLICATION, WITH LONG-TERM CURRENT USE OF INSULIN (H): ICD-10-CM

## 2019-05-09 RX ORDER — METFORMIN HCL 500 MG
1000 TABLET, EXTENDED RELEASE 24 HR ORAL
Qty: 60 TABLET | Refills: 3 | Status: SHIPPED | OUTPATIENT
Start: 2019-05-09 | End: 2019-06-06

## 2019-05-09 RX ORDER — LOSARTAN POTASSIUM 25 MG/1
25 TABLET ORAL DAILY
Qty: 90 TABLET | Refills: 1 | Status: SHIPPED | OUTPATIENT
Start: 2019-05-09 | End: 2019-06-06

## 2019-06-06 ENCOUNTER — OFFICE VISIT (OUTPATIENT)
Dept: INTERNAL MEDICINE | Facility: CLINIC | Age: 49
End: 2019-06-06
Payer: COMMERCIAL

## 2019-06-06 VITALS
HEART RATE: 98 BPM | RESPIRATION RATE: 18 BRPM | OXYGEN SATURATION: 97 % | DIASTOLIC BLOOD PRESSURE: 80 MMHG | TEMPERATURE: 97.7 F | SYSTOLIC BLOOD PRESSURE: 120 MMHG | BODY MASS INDEX: 31.13 KG/M2 | WEIGHT: 175.7 LBS | HEIGHT: 63 IN

## 2019-06-06 DIAGNOSIS — E78.5 HYPERLIPIDEMIA LDL GOAL <100: ICD-10-CM

## 2019-06-06 DIAGNOSIS — E11.9 TYPE 2 DIABETES MELLITUS WITHOUT COMPLICATION, WITHOUT LONG-TERM CURRENT USE OF INSULIN (H): ICD-10-CM

## 2019-06-06 DIAGNOSIS — B35.1 FUNGAL INFECTION OF TOENAIL: ICD-10-CM

## 2019-06-06 DIAGNOSIS — R80.9 MICROALBUMINURIA: ICD-10-CM

## 2019-06-06 DIAGNOSIS — Z00.00 ROUTINE GENERAL MEDICAL EXAMINATION AT A HEALTH CARE FACILITY: Primary | ICD-10-CM

## 2019-06-06 LAB
ERYTHROCYTE [DISTWIDTH] IN BLOOD BY AUTOMATED COUNT: 12.9 % (ref 10–15)
HBA1C MFR BLD: 6.5 % (ref 0–5.6)
HCT VFR BLD AUTO: 42.5 % (ref 40–53)
HGB BLD-MCNC: 14.4 G/DL (ref 13.3–17.7)
MCH RBC QN AUTO: 29.4 PG (ref 26.5–33)
MCHC RBC AUTO-ENTMCNC: 33.9 G/DL (ref 31.5–36.5)
MCV RBC AUTO: 87 FL (ref 78–100)
PLATELET # BLD AUTO: 337 10E9/L (ref 150–450)
RBC # BLD AUTO: 4.9 10E12/L (ref 4.4–5.9)
WBC # BLD AUTO: 7.2 10E9/L (ref 4–11)

## 2019-06-06 PROCEDURE — 99396 PREV VISIT EST AGE 40-64: CPT | Performed by: INTERNAL MEDICINE

## 2019-06-06 PROCEDURE — 85027 COMPLETE CBC AUTOMATED: CPT | Performed by: INTERNAL MEDICINE

## 2019-06-06 PROCEDURE — 83036 HEMOGLOBIN GLYCOSYLATED A1C: CPT | Performed by: INTERNAL MEDICINE

## 2019-06-06 PROCEDURE — 80053 COMPREHEN METABOLIC PANEL: CPT | Performed by: INTERNAL MEDICINE

## 2019-06-06 PROCEDURE — 99214 OFFICE O/P EST MOD 30 MIN: CPT | Mod: 25 | Performed by: INTERNAL MEDICINE

## 2019-06-06 PROCEDURE — 99207 C FOOT EXAM  NO CHARGE: CPT | Mod: 25 | Performed by: INTERNAL MEDICINE

## 2019-06-06 PROCEDURE — 82043 UR ALBUMIN QUANTITATIVE: CPT | Performed by: INTERNAL MEDICINE

## 2019-06-06 PROCEDURE — 36415 COLL VENOUS BLD VENIPUNCTURE: CPT | Performed by: INTERNAL MEDICINE

## 2019-06-06 PROCEDURE — 84443 ASSAY THYROID STIM HORMONE: CPT | Performed by: INTERNAL MEDICINE

## 2019-06-06 PROCEDURE — 80061 LIPID PANEL: CPT | Performed by: INTERNAL MEDICINE

## 2019-06-06 RX ORDER — LOSARTAN POTASSIUM 25 MG/1
25 TABLET ORAL DAILY
Qty: 90 TABLET | Refills: 2 | Status: SHIPPED | OUTPATIENT
Start: 2019-06-06 | End: 2020-03-16

## 2019-06-06 RX ORDER — METFORMIN HCL 500 MG
1000 TABLET, EXTENDED RELEASE 24 HR ORAL
Qty: 180 TABLET | Refills: 2 | Status: SHIPPED | OUTPATIENT
Start: 2019-06-06 | End: 2019-06-09

## 2019-06-06 RX ORDER — ATORVASTATIN CALCIUM 40 MG/1
40 TABLET, FILM COATED ORAL DAILY
Qty: 90 TABLET | Refills: 2 | Status: SHIPPED | OUTPATIENT
Start: 2019-06-06 | End: 2020-02-24

## 2019-06-06 RX ORDER — TERBINAFINE HYDROCHLORIDE 250 MG/1
250 TABLET ORAL DAILY
Qty: 14 TABLET | Refills: 0 | Status: SHIPPED | OUTPATIENT
Start: 2019-06-06 | End: 2019-07-01

## 2019-06-06 ASSESSMENT — ENCOUNTER SYMPTOMS
NERVOUS/ANXIOUS: 0
FEVER: 0
DYSURIA: 0
JOINT SWELLING: 0
ARTHRALGIAS: 0
COUGH: 0
EYE PAIN: 0
SORE THROAT: 0
PARESTHESIAS: 0
HEMATURIA: 0
NAUSEA: 0
SHORTNESS OF BREATH: 1
MYALGIAS: 0
HEMATOCHEZIA: 0
ABDOMINAL PAIN: 0
DIARRHEA: 0
WEAKNESS: 0
PALPITATIONS: 0
CONSTIPATION: 0
DIZZINESS: 0
FREQUENCY: 0
HEADACHES: 1
HEARTBURN: 0
CHILLS: 0

## 2019-06-06 ASSESSMENT — MIFFLIN-ST. JEOR: SCORE: 1567.1

## 2019-06-06 NOTE — PATIENT INSTRUCTIONS
Plan:  1.  Labs today   2. Continue same meds, same doses for now   3.Terbinafine/  Lamisil 250 mg daily for the toes for 2 weeks. The days you take Terbinafine you will not take the Atorvastatin   4. If the insurance doesn't cover the Terbinafine/  Lamisil tablets, there is Lamisil cream over the counter and you apply it twice a day to the toes and once a day to the entire foot  5. Please make a lab appointment for fasting labs in January 6. Please make an appointment few days after the labs to discuss about the results.

## 2019-06-06 NOTE — PROGRESS NOTES
Dr Garcia's note    Patient's instructions / PLAN:                                                        Plan:  1.  Labs today   2. Continue same meds, same doses for now   3.Terbinafine/  Lamisil 250 mg daily for the toes for 2 weeks. The days you take Terbinafine you will not take the Atorvastatin   4. If the insurance doesn't cover the Terbinafine/  Lamisil tablets, there is Lamisil cream over the counter and you apply it twice a day to the toes and once a day to the entire foot  5. Please make a lab appointment for fasting labs in January 6. Please make an appointment few days after the labs to discuss about the results.       ASSESSMENT & PLAN:                                                      (Z00.00) Routine general medical examination at a health care facility  (primary encounter diagnosis)  Comment:   Plan: CBC with platelets, Comprehensive metabolic         panel, Hemoglobin A1c, TSH with free T4 reflex,        Albumin Random Urine Quantitative with Creat         Ratio, Comprehensive metabolic panel,         Hemoglobin A1c, Lipid panel reflex to direct         LDL Fasting            (E11.9) DM 2, no insulin (H)  Comment: Controlled    Plan: CBC with platelets, Comprehensive metabolic         panel, Hemoglobin A1c, TSH with free T4 reflex,        Albumin Random Urine Quantitative with Creat         Ratio, metFORMIN (GLUCOPHAGE-XR) 500 MG 24 hr         tablet, atorvastatin (LIPITOR) 40 MG tablet,         Comprehensive metabolic panel, Hemoglobin A1c,         Lipid panel reflex to direct LDL Fasting, FOOT         EXAM            (E78.5) Hyperlipidemia LDL goal <100  Comment: Controlled    Plan: CBC with platelets, Comprehensive metabolic         panel, Hemoglobin A1c, TSH with free T4 reflex,        Albumin Random Urine Quantitative with Creat         Ratio, Comprehensive metabolic panel,         Hemoglobin A1c, Lipid panel reflex to direct         LDL Fasting            (B35.1) Fungal infection of  toenail  Comment: We discussed about the new meds, advantages and potential side effects. The patient will read also the info from the pharmacy and call back if questions.   Plan: terbinafine (LAMISIL) 250 MG tablet            (R80.9) Microalbuminuria  Comment:   Plan: losartan (COZAAR) 25 MG tablet               Chief Complaint:                                                      Annual exam  Follow up chronic medical problems    Toe pain  Due to language barrier, an  was present during the history-taking and subsequent discussion (and for part of the physical exam) with this patient.     SUBJECTIVE:                                                    History of present illness     DM  , 128, 115, 103, 102,       Toe pain  -- x 2 months or longer  -- no trauma Exam: Fungal infection between bilat 4th and 5th toe     ROS:    See below        Past Medical History:   Diagnosis Date     Hyperlipidemia LDL goal <100 6/8/2019     Type 2 diabetes mellitus without complication, with long-term current use of insulin (H) 5/1/2018      PSHx: reviewed  History reviewed. No pertinent surgical history.     Soc Hx: No daily alcohol, no smoking  Social History     Socioeconomic History     Marital status:      Spouse name: Not on file     Number of children: Not on file     Years of education: Not on file     Highest education level: Not on file   Occupational History     Not on file   Social Needs     Financial resource strain: Not on file     Food insecurity:     Worry: Not on file     Inability: Not on file     Transportation needs:     Medical: Not on file     Non-medical: Not on file   Tobacco Use     Smoking status: Never Smoker     Smokeless tobacco: Never Used   Substance and Sexual Activity     Alcohol use: Yes     Comment: Socially     Drug use: No     Sexual activity: Not on file   Lifestyle     Physical activity:     Days per week: Not on file     Minutes per session: Not on file     Stress: Not  on file   Relationships     Social connections:     Talks on phone: Not on file     Gets together: Not on file     Attends [employer]ism service: Not on file     Active member of club or organization: Not on file     Attends meetings of clubs or organizations: Not on file     Relationship status: Not on file     Intimate partner violence:     Fear of current or ex partner: Not on file     Emotionally abused: Not on file     Physically abused: Not on file     Forced sexual activity: Not on file   Other Topics Concern     Parent/sibling w/ CABG, MI or angioplasty before 65F 55M? Not Asked   Social History Narrative     Not on file        Fam Hx: reviewed  Family History   Problem Relation Age of Onset     Hypertension Mother      Diabetes Other      No Known Problems Father      No Known Problems Maternal Grandmother      No Known Problems Maternal Grandfather      No Known Problems Paternal Grandmother      No Known Problems Paternal Grandfather          Screening: reviewed    All: reviewed    Meds: reviewed  Current Outpatient Medications   Medication Sig Dispense Refill     alcohol swab prep pads Use to swab area of injection/shelby as directed. 100 each 3     aspirin (ASA) 81 MG tablet Take 1 tablet (81 mg) by mouth daily 90 tablet 3     atorvastatin (LIPITOR) 40 MG tablet Take 1 tablet (40 mg) by mouth daily 30 tablet 1     blood glucose (ONETOUCH VERIO IQ) test strip Use to test blood sugar 1-2 times daily or as directed. 100 each 11     blood glucose monitoring (ONE TOUCH DELICA) lancets Use to test blood sugar 1-2 times daily or as directed. 100 each 11     Blood Glucose Monitoring Suppl (ONETOUCH VERIO FLEX SYSTEM) W/DEVICE KIT 1 strip 2 times daily 1 kit 0     IBUPROFEN PO Take 1 tablet by mouth daily as needed for moderate pain       losartan (COZAAR) 25 MG tablet Take 1 tablet (25 mg) by mouth daily 90 tablet 1     metFORMIN (GLUCOPHAGE-XR) 500 MG 24 hr tablet Take 2 tablets (1,000 mg) by mouth daily (with  "dinner) 60 tablet 3           OBJECTIVE:                                                    Physical Exam :      Blood pressure 120/80, pulse 98, temperature 97.7  F (36.5  C), temperature source Oral, resp. rate 18, height 1.6 m (5' 3\"), weight 79.7 kg (175 lb 11.2 oz), SpO2 97 %.   NAD, appears comfortable  Skin clear, no rashes  HEENT: PERRLA, EOMI, anicteric sclera, pink conjunctiva, external ears appear normal, bilateral tympanic membranes clinically normal, oropharynx normal color.  Neck: supple, no JVD,  no thyroidmegaly  Lymph nodes non palpable in the cervical, supraclavicular axillaries, inguinal areas  Chest: clear to auscultation with good respiratory effort  Cardiac: S1S2, RRR, no mgr appreciated  Abdomen: soft, not tender, not distended, audible bowel sound, no hepatosplenomegaly, no palpable masses, no abdominal bruits  Extremities: no cyanosis, clubbing or edema. Toes,,as above   Neuro: A, Ox3, no focal signs.        Katarina Garcia MD  Internal Medicine     SUBJECTIVE:   CC: Norma Arcos is an 49 year old male who presents for preventative health visit.     Healthy Habits:     Getting at least 3 servings of Calcium per day:  Yes    Bi-annual eye exam:  Yes    Dental care twice a year:  NO    Sleep apnea or symptoms of sleep apnea:  Daytime drowsiness and Excessive snoring    Diet:  Diabetic, Vegetarian/vegan and Other    Frequency of exercise:  2-3 days/week    Duration of exercise:  15-30 minutes    Taking medications regularly:  Yes    Medication side effects:  None    PHQ-2 Total Score: 0    Additional concerns today:  Yes          Diabetes Follow-up      How often are you checking your blood sugar? One time daily    What time of day are you checking your blood sugars (select all that apply)?  Before meals    Have you had any blood sugars above 200?  No    Have you had any blood sugars below 70?  No    What symptoms do you notice when your blood sugar is low?  None    What concerns do you have " today about your diabetes? None     Do you have any of these symptoms? (Select all that apply)  Redness, sores, or blisters on feet     Have you had a diabetic eye exam in the last 12 months? No    BP Readings from Last 2 Encounters:   06/06/19 120/80   04/03/19 129/88     Hemoglobin A1C (%)   Date Value   06/06/2019 6.5 (H)   10/23/2018 6.0 (H)     LDL Cholesterol Calculated (mg/dL)   Date Value   06/06/2019 109 (H)   10/23/2018 92       Diabetes Management Resources  Hyperlipidemia Follow-Up      Are you having any of the following symptoms? (Select all that apply)  Left-sided neck or arm pain    Are you regularly taking any medication or supplement to lower your cholesterol?   Yes- statin    Are you having muscle aches or other side effects that you think could be caused by your cholesterol lowering medication?  No      Hypertension Follow-up      Do you check your blood pressure regularly outside of the clinic? No     Are you following a low salt diet? No    Are your blood pressures ever more than 140 on the top number (systolic) OR more   than 90 on the bottom number (diastolic), for example 140/90? No    Today's PHQ-2 Score:   PHQ-2 ( 1999 [employer]) 6/6/2019   Q1: Little interest or pleasure in doing things 0   Q2: Feeling down, depressed or hopeless 0   PHQ-2 Score 0   Q1: Little interest or pleasure in doing things Not at all   Q2: Feeling down, depressed or hopeless Not at all   PHQ-2 Score 0       Abuse: Current or Past(Physical, Sexual or Emotional)- No  Do you feel safe in your environment? Yes    Social History     Tobacco Use     Smoking status: Never Smoker     Smokeless tobacco: Never Used   Substance Use Topics     Alcohol use: Yes     Comment: Socially     If you drink alcohol do you typically have >3 drinks per day or >7 drinks per week? No    Alcohol Use 6/6/2019   Prescreen: >3 drinks/day or >7 drinks/week? No   Prescreen: >3 drinks/day or >7 drinks/week? -       Last PSA:   PSA   Date Value Ref  "Range Status   03/26/2018 0.30 0 - 4 ug/L Final     Comment:     Assay Method:  Chemiluminescence using Siemens Vista analyzer       Reviewed orders with patient. Reviewed health maintenance and updated orders accordingly - Yes  Labs reviewed in EPIC    Reviewed and updated as needed this visit by clinical staff  Tobacco  Allergies  Med Hx  Surg Hx  Fam Hx  Soc Hx        Reviewed and updated as needed this visit by Provider            Review of Systems   Constitutional: Negative for chills and fever.   HENT: Negative for congestion, ear pain, hearing loss and sore throat.    Eyes: Negative for pain and visual disturbance.   Respiratory: Positive for shortness of breath. Negative for cough.    Cardiovascular: Negative for chest pain, palpitations and peripheral edema.   Gastrointestinal: Negative for abdominal pain, constipation, diarrhea, heartburn, hematochezia and nausea.   Genitourinary: Negative for discharge, dysuria, frequency, genital sores, hematuria, impotence and urgency.   Musculoskeletal: Negative for arthralgias, joint swelling and myalgias.   Skin: Negative for rash.   Neurological: Positive for headaches. Negative for dizziness, weakness and paresthesias.   Psychiatric/Behavioral: Negative for mood changes. The patient is not nervous/anxious.      Works w [employer] company and sometimes SOB at work, but he feels well in weekend     COUNSELING:   Reviewed preventive health counseling, as reflected in patient instructions       Regular exercise       Healthy diet/nutrition    Estimated body mass index is 30.95 kg/m  as calculated from the following:    Height as of 4/3/19: 1.6 m (5' 3\").    Weight as of 4/3/19: 79.2 kg (174 lb 11.2 oz).          reports that he has never smoked. He has never used smokeless tobacco.      Counseling Resources:  ATP IV Guidelines  Pooled Cohorts Equation Calculator  FRAX Risk Assessment  ICSI Preventive Guidelines  Dietary Guidelines for Americans, 2010  USDA's MyPlate  ASA " Prophylaxis  Lung CA Screening    Katarina Shaffer MD  [facility]

## 2019-06-06 NOTE — NURSING NOTE
"/80 (BP Location: Right arm, Patient Position: Sitting, Cuff Size: Adult Large)   Pulse 98   Temp 97.7  F (36.5  C) (Oral)   Resp 18   Ht 1.6 m (5' 3\")   Wt 79.7 kg (175 lb 11.2 oz)   SpO2 97%   BMI 31.12 kg/m    Arelis Tafoya CMA    "

## 2019-06-06 NOTE — LETTER
[facility]  [address], [address]  [place] [address]  [phone]        June 9, 2019    Norma Arcos  [address]  [facility] [address]            Dear Mr. Norma Arcos:    These are the recent blood test results.  They are in the same range as before, but your A1c is higher.  I sent prescription for increased dose of metformin 2 tablets twice a day to help with the blood sugar control.  Follow-up with labs and office visit in January, as we discussed.    Sincerely,    Katarina Garcia MD  Internal Medicine    These are some general explanations for tests  WBC means White Blood Cells  Platelets are small blood cells that help with forming the blood clots along with other blood factors.  Electrolytes are Sodium, Potassium, Calcium, Magnesium, Phosphorus.  Liver tests are: AST, ALT, Bilirubin, Alkaline Phosphatase.  Kidney tests are Creatinine, GFR.  HDL Cholesterol - is the good cholesterol and it is good to have it high.  LDL cholesterol is the bad cholesterol and it is good to have it low.  It is recommended to have LDL less than 130 for people with hypertension and to have it less than 100 for people with heart disease, diabetes and chronic kidney disease.  Thyroid tests are TSH, T4, T3  A1c is a test that gives us an idea about how well was controlled the diabetes for the last 3 months.

## 2019-06-07 LAB
ALBUMIN SERPL-MCNC: 4.1 G/DL (ref 3.4–5)
ALP SERPL-CCNC: 68 U/L (ref 40–150)
ALT SERPL W P-5'-P-CCNC: 35 U/L (ref 0–70)
ANION GAP SERPL CALCULATED.3IONS-SCNC: 6 MMOL/L (ref 3–14)
AST SERPL W P-5'-P-CCNC: 23 U/L (ref 0–45)
BILIRUB SERPL-MCNC: 1.7 MG/DL (ref 0.2–1.3)
BUN SERPL-MCNC: 12 MG/DL (ref 7–30)
CALCIUM SERPL-MCNC: 8.8 MG/DL (ref 8.5–10.1)
CHLORIDE SERPL-SCNC: 104 MMOL/L (ref 94–109)
CHOLEST SERPL-MCNC: 180 MG/DL
CO2 SERPL-SCNC: 29 MMOL/L (ref 20–32)
CREAT SERPL-MCNC: 0.95 MG/DL (ref 0.66–1.25)
CREAT UR-MCNC: 158 MG/DL
GFR SERPL CREATININE-BSD FRML MDRD: >90 ML/MIN/{1.73_M2}
GLUCOSE SERPL-MCNC: 102 MG/DL (ref 70–99)
HDLC SERPL-MCNC: 49 MG/DL
LDLC SERPL CALC-MCNC: 109 MG/DL
MICROALBUMIN UR-MCNC: 82 MG/L
MICROALBUMIN/CREAT UR: 51.77 MG/G CR (ref 0–17)
NONHDLC SERPL-MCNC: 131 MG/DL
POTASSIUM SERPL-SCNC: 3.4 MMOL/L (ref 3.4–5.3)
PROT SERPL-MCNC: 8 G/DL (ref 6.8–8.8)
SODIUM SERPL-SCNC: 139 MMOL/L (ref 133–144)
TRIGL SERPL-MCNC: 108 MG/DL
TSH SERPL DL<=0.005 MIU/L-ACNC: 1.06 MU/L (ref 0.4–4)

## 2019-06-08 PROBLEM — E78.5 HYPERLIPIDEMIA LDL GOAL <100: Status: ACTIVE | Noted: 2019-06-08

## 2019-06-08 PROBLEM — E11.9 TYPE 2 DIABETES MELLITUS WITHOUT COMPLICATION, WITHOUT LONG-TERM CURRENT USE OF INSULIN (H): Status: ACTIVE | Noted: 2019-06-08

## 2019-06-09 DIAGNOSIS — E11.9 TYPE 2 DIABETES MELLITUS WITHOUT COMPLICATION, WITHOUT LONG-TERM CURRENT USE OF INSULIN (H): ICD-10-CM

## 2019-06-09 RX ORDER — METFORMIN HCL 500 MG
1000 TABLET, EXTENDED RELEASE 24 HR ORAL 2 TIMES DAILY WITH MEALS
Qty: 360 TABLET | Refills: 2 | Status: SHIPPED | OUTPATIENT
Start: 2019-06-09 | End: 2020-02-24

## 2019-07-01 ENCOUNTER — OFFICE VISIT (OUTPATIENT)
Dept: PHARMACY | Facility: CLINIC | Age: 49
End: 2019-07-01
Payer: COMMERCIAL

## 2019-07-01 VITALS
SYSTOLIC BLOOD PRESSURE: 119 MMHG | HEART RATE: 69 BPM | DIASTOLIC BLOOD PRESSURE: 79 MMHG | WEIGHT: 176.9 LBS | BODY MASS INDEX: 31.34 KG/M2

## 2019-07-01 DIAGNOSIS — I10 ESSENTIAL HYPERTENSION: Primary | ICD-10-CM

## 2019-07-01 DIAGNOSIS — L03.039 INFLAMMATION OF TOENAIL, UNSPECIFIED LATERALITY: ICD-10-CM

## 2019-07-01 DIAGNOSIS — E11.8 TYPE 2 DIABETES MELLITUS WITH COMPLICATION, WITHOUT LONG-TERM CURRENT USE OF INSULIN (H): ICD-10-CM

## 2019-07-01 DIAGNOSIS — E78.5 HYPERLIPIDEMIA LDL GOAL <100: ICD-10-CM

## 2019-07-01 PROCEDURE — 99607 MTMS BY PHARM ADDL 15 MIN: CPT | Performed by: PHARMACIST

## 2019-07-01 PROCEDURE — 99606 MTMS BY PHARM EST 15 MIN: CPT | Performed by: PHARMACIST

## 2019-07-01 RX ORDER — IBUPROFEN 200 MG
400 TABLET ORAL DAILY PRN
COMMUNITY
End: 2021-11-16

## 2019-07-01 NOTE — PROGRESS NOTES
SUBJECTIVE/OBJECTIVE:                Norma Arcos is a 47 year old male coming in for a follow-up visit for Medication Therapy Management.  He was referred to me from Dr. Wood; was seen by Dr. Garcia in June. An  joined us over the phone today for part of the visit and then an  joined us in the room for the second part of the visit.    Chief Complaint: Follow up from our visit on 3/18/19     Tobacco: No tobacco use  Alcohol: none    Medication Adherence/Access: taking maintenance medications prn - see below.     Diabetes:  Pt currently taking metformin XR 2127-4561 mg daily.   SMBG: Has not been checking blood sugar at home. Ranges (patient log): Pt has not been testing lately.  Patient is not experiencing hypoglycemia  Recent symptoms of high blood sugar? Polyuria  Eye exam: up to date, had 4/10 - found beginning of cataracts and got prescription for eye glasses.   Foot exam: due  Microalbumin is not < 30 mg/g. Pt is taking an ACEi/ARB (losartan).  Aspirin: Taking 81mg daily   Has been watching what he eats and exercising more.  Getting to the gym 1-2 times per week, has bike and weights at home.  Lab Results   Component Value Date    A1C 6.5 06/06/2019    A1C 6.0 10/23/2018    A1C >15.0 03/26/2018     Hypertension: Current medications include losartan 25 mg daily. Patient does not self-monitor BP.  Patient reports getting headaches.  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  Has quit and headaches are better.  Has taken ibuprofen for the headaches; using 2 tablets as needed but no every day.  APAP not effective.    BP Readings from Last 3 Encounters:   06/06/19 120/80   04/03/19 129/88   03/18/19 112/77       Hyperlipidemia: Current therapy includes atorvastatin 40 mg once daily.  Pt reports no significant myalgias or other side effects.  The 10-year ASCVD risk score ([place] NITA Jr., et al., 2013) is: 12.1%    Values used to calculate the score:      Age: 49 years       Sex: Male      Is Non- : Yes      Diabetic: Yes      Tobacco smoker: No      Systolic Blood Pressure: 120 mmHg      Is BP treated: Yes      HDL Cholesterol: 49 mg/dL      Total Cholesterol: 180 mg/dL   Recent Labs   Lab Test 06/06/19  1451 10/23/18  1141   CHOL 180 158   HDL 49 50   * 92   TRIG 108 78     Nail Infection:  Treated with terbinafine.  Reports infection resolved.  Currently therapy not needed.    Current labs include:  Today's Vitals: /79   Pulse 69   Wt 176 lb 14.4 oz (80.2 kg)   BMI 31.34 kg/m      Most Recent Immunizations   Administered Date(s) Administered     Influenza (IIV3) PF 10/26/2018       ASSESSMENT:              Current medications were reviewed today as discussed above.      Medication Adherence: fair, see note below.    Diabetes: Needs Improvement. Pt reports only taking 2-3 tablets (3533-4211 mg) of metformin daily but his dose was changed on June 9 to 1000 mg BID. Pt bottle he brought in said 1000 mg daily but it was old. Pt reports not checking his blood sugars because he wasn't getting any blood from his finger when he tried to test. Pt reports using the same needle for multiple tests. Pt educated on using a new needle each time he tests his blood sugars. After using a new needle and educating patient on proper technique, pt was able to test during the visit. Pt experiencing polyuria, but reports drinking three bottles of water. Pt reports his urine is clear, and increased frequency likely due to large fluid intake. Continue to monitor.    Hypertension: Stable.    Hyperlipidemia: Stable    Toenail Infection: resolved    PLAN:                  1.  Increase metformin 500 mg to 2 tablets every morning and 2 tablets every evening  2.  Check blood sugar every day, using a new lancet with every check  3.  Ibuprofen as needed for headaches, if headaches continue or get worse follow-up with Dr. Garcia.   4. Refill aspirin    I spent 30 minutes with  this patient today. All changes were made via verbal approval with Dr. Garcia. A copy of the visit note was provided to the patient's primary care provider.     Will follow up in 3 months     The patient was given a summary of these recommendations as an after visit summary.    Chandu Lan, Pharmacy Student    Kassidy Craft , Pharm D  [phone] (phone)  [phone] (pager)  Medication Therapy Management Pharmacist

## 2019-07-01 NOTE — PATIENT INSTRUCTIONS
Recommendations from today's MTM visit:                                                      1.  Increase metformin to 2 tablets every morning and 2 tablets every evening.    2.  Check blood sugars once daily, use a new lancet with every check.    3.  Ibuprofen as needed for headaches, if headaches continue or get worse follow-up with Dr. Garcia.      Next MTM visit: 3 months    To schedule another MTM appointment, please call the clinic directly or you may call the MTM scheduling line at [phone] or toll-free at [phone].     My Clinical Pharmacist's contact information:                                                      It was a pleasure talking with you today!  Please feel free to contact me with any questions or concerns you have.      Kassidy Craft , Pharm D  [phone] (phone)  [phone] (pager)  Medication Therapy Management Pharmacist     You may receive a survey about the MTM services you received by email and/or US Mail.  I would appreciate your feedback to help me serve you better in the future. Your comments will be anonymous.

## 2019-08-14 ENCOUNTER — TELEPHONE (OUTPATIENT)
Dept: INTERNAL MEDICINE | Facility: CLINIC | Age: 49
End: 2019-08-14

## 2019-08-14 DIAGNOSIS — E11.9 TYPE 2 DIABETES MELLITUS WITHOUT COMPLICATION, WITHOUT LONG-TERM CURRENT USE OF INSULIN (H): Primary | ICD-10-CM

## 2019-08-14 NOTE — TELEPHONE ENCOUNTER
[facility] calling because patient had insurance change and so they now need a rx for Contour next meter, all diabetic supplies and chema microlet lancets.

## 2019-08-15 RX ORDER — BLOOD-GLUCOSE METER
EACH MISCELLANEOUS
Qty: 1 KIT | Refills: 0 | Status: SHIPPED | OUTPATIENT
Start: 2019-08-15 | End: 2022-09-13

## 2019-08-15 RX ORDER — BLOOD-GLUCOSE CONTROL, LOW
EACH MISCELLANEOUS
Qty: 1 BOTTLE | Refills: 3 | Status: SHIPPED | OUTPATIENT
Start: 2019-08-15 | End: 2022-09-13

## 2019-08-15 NOTE — TELEPHONE ENCOUNTER
Prescription [place] per [facility] [place]. JULIA Keating R.N.

## 2019-10-07 ENCOUNTER — TELEPHONE (OUTPATIENT)
Dept: PHARMACY | Facility: CLINIC | Age: 49
End: 2019-10-07

## 2019-12-05 DIAGNOSIS — R80.9 MICROALBUMINURIA: ICD-10-CM

## 2019-12-05 DIAGNOSIS — E11.9 TYPE 2 DIABETES MELLITUS WITHOUT COMPLICATION, WITHOUT LONG-TERM CURRENT USE OF INSULIN (H): ICD-10-CM

## 2019-12-05 DIAGNOSIS — E11.8 TYPE 2 DIABETES MELLITUS WITH COMPLICATION, WITHOUT LONG-TERM CURRENT USE OF INSULIN (H): ICD-10-CM

## 2019-12-05 NOTE — TELEPHONE ENCOUNTER
Patient called to request refills.  All prescriptions were filled by Dr. Garcia in June for 90-day supply and 2 refills.  Instructed patient to contact pharmacy for refills.

## 2020-01-07 ENCOUNTER — TELEPHONE (OUTPATIENT)
Dept: PHARMACY | Facility: CLINIC | Age: 50
End: 2020-01-07

## 2020-01-07 NOTE — TELEPHONE ENCOUNTER
Called patient to follow-up.  He is still waiting on his insurance to be active.  Will outreach again next month.

## 2020-02-24 ENCOUNTER — TELEPHONE (OUTPATIENT)
Dept: PHARMACY | Facility: CLINIC | Age: 50
End: 2020-02-24

## 2020-02-24 DIAGNOSIS — E11.8 TYPE 2 DIABETES MELLITUS WITH COMPLICATION, WITHOUT LONG-TERM CURRENT USE OF INSULIN (H): ICD-10-CM

## 2020-02-24 DIAGNOSIS — E11.9 TYPE 2 DIABETES MELLITUS WITHOUT COMPLICATION, WITHOUT LONG-TERM CURRENT USE OF INSULIN (H): ICD-10-CM

## 2020-02-24 RX ORDER — METFORMIN HCL 500 MG
1000 TABLET, EXTENDED RELEASE 24 HR ORAL 2 TIMES DAILY WITH MEALS
Qty: 120 TABLET | Refills: 0 | Status: SHIPPED | OUTPATIENT
Start: 2020-02-24 | End: 2020-03-16

## 2020-02-24 RX ORDER — ATORVASTATIN CALCIUM 40 MG/1
40 TABLET, FILM COATED ORAL DAILY
Qty: 30 TABLET | Refills: 0 | Status: SHIPPED | OUTPATIENT
Start: 2020-02-24 | End: 2020-03-16

## 2020-02-24 NOTE — TELEPHONE ENCOUNTER
Called patient with [employer] - he will have insurance activated on March 1st.    He does need refills on his medications.  Scheduled follow-up in March.

## 2020-03-16 ENCOUNTER — TELEPHONE (OUTPATIENT)
Dept: PHARMACY | Facility: CLINIC | Age: 50
End: 2020-03-16

## 2020-03-16 DIAGNOSIS — E11.9 TYPE 2 DIABETES MELLITUS WITHOUT COMPLICATION, WITHOUT LONG-TERM CURRENT USE OF INSULIN (H): ICD-10-CM

## 2020-03-16 DIAGNOSIS — R80.9 MICROALBUMINURIA: ICD-10-CM

## 2020-03-16 DIAGNOSIS — E11.8 TYPE 2 DIABETES MELLITUS WITH COMPLICATION, WITHOUT LONG-TERM CURRENT USE OF INSULIN (H): ICD-10-CM

## 2020-03-16 RX ORDER — LOSARTAN POTASSIUM 25 MG/1
25 TABLET ORAL DAILY
Qty: 30 TABLET | Refills: 0 | Status: SHIPPED | OUTPATIENT
Start: 2020-03-16 | End: 2020-04-30

## 2020-03-16 RX ORDER — METFORMIN HCL 500 MG
1000 TABLET, EXTENDED RELEASE 24 HR ORAL 2 TIMES DAILY WITH MEALS
Qty: 120 TABLET | Refills: 0 | Status: SHIPPED | OUTPATIENT
Start: 2020-03-16 | End: 2021-01-22

## 2020-03-16 RX ORDER — ATORVASTATIN CALCIUM 40 MG/1
40 TABLET, FILM COATED ORAL DAILY
Qty: 30 TABLET | Refills: 0 | Status: SHIPPED | OUTPATIENT
Start: 2020-03-16 | End: 2021-01-22

## 2020-03-16 NOTE — TELEPHONE ENCOUNTER
Contacted patient about insurance - he is still trying to get insurance reinstated.  Will reach out next month.

## 2020-06-12 NOTE — TELEPHONE ENCOUNTER
Patient called for refill on the metformin.  He is due for labs - provided him the number for the [facility] clinic to schedule an appointment.   STATUS POST:  6/9: sigmoid colon resection, small bowel resection, primary bladder repair, ileostomy creation     SUBJECTIVE: Patient seen and examined bedside by chief resident.  patient complains of gas pain and lower back pain. tolerating CLD and some mashed potatoes without nausea and vomiting. ambulated yesterday.     heparin   Injectable 7500 Unit(s) SubCutaneous every 8 hours      Vital Signs Last 24 Hrs  T(C): 37.2 (12 Jun 2020 04:46), Max: 37.2 (11 Jun 2020 20:01)  T(F): 98.9 (12 Jun 2020 04:46), Max: 99 (11 Jun 2020 20:01)  HR: 95 (12 Jun 2020 04:46) (95 - 107)  BP: 134/87 (12 Jun 2020 04:46) (127/83 - 138/85)  BP(mean): --  RR: 18 (12 Jun 2020 04:46) (17 - 18)  SpO2: 93% (12 Jun 2020 04:46) (92% - 94%)  I&O's Detail    11 Jun 2020 07:01  -  12 Jun 2020 07:00  --------------------------------------------------------  IN:    lactated ringers.: 1000 mL    Oral Fluid: 1120 mL  Total IN: 2120 mL    OUT:    Bulb: 17 mL    Bulb: 168 mL    Ileostomy: 370 mL    Indwelling Catheter - Urethral: 1025 mL  Total OUT: 1580 mL    Total NET: 540 mL          General: NAD, resting comfortably in bed  C/V: NSR  Pulm: Nonlabored breathing, no respiratory distress  Abd: obese, +distended, midline incision with staples, NIDA x 2 to suction with serosang output, ileostomy with liquid stool   Extrem: WWP, no edema, SCDs in place        LABS:                        13.5   10.90 )-----------( 264      ( 12 Jun 2020 07:29 )             42.4     06-11    136  |  101  |  15  ----------------------------<  116<H>  4.5   |  25  |  0.84    Ca    8.9      11 Jun 2020 07:32  Phos  2.7     06-11  Mg     2.1     06-11            RADIOLOGY & ADDITIONAL STUDIES:

## 2020-06-17 ENCOUNTER — VIRTUAL VISIT (OUTPATIENT)
Dept: INTERNAL MEDICINE | Facility: CLINIC | Age: 50
End: 2020-06-17
Payer: COMMERCIAL

## 2020-06-17 DIAGNOSIS — Z53.9 ERRONEOUS ENCOUNTER--DISREGARD: Primary | ICD-10-CM

## 2020-06-17 NOTE — PROGRESS NOTES
"I was able to talk to the patient in the earlier encounter.     Norma Arcos is a 50 year old male who is being evaluated via a billable telephone visit.      The patient has been notified of following:     \"This telephone visit will be conducted via a call between you and your physician/provider. We have found that certain health care needs can be provided without the need for a physical exam.  This service lets us provide the care you need with a short phone conversation.  If a prescription is necessary we can send it directly to your pharmacy.  If lab work is needed we can place an order for that and you can then stop by our lab to have the test done at a later time.    Telephone visits are billed at different rates depending on your insurance coverage. During this emergency period, for some insurers they may be billed the same as an in-person visit.  Please reach out to your insurance provider with any questions.    If during the course of the call the physician/provider feels a telephone visit is not appropriate, you will not be charged for this service.\"    Patient has given verbal consent for Telephone visit?  Yes    What phone number would you like to be contacted at? [phone]    How would you like to obtain your AVS? Mail a copy    Subjective     Foli RG Arcos is a 50 year old male who presents via phone visit today for the following health issues:  PATIENT NEEDS LETTER SIGNED.  HPI  Diabetes Follow-up    How often are you checking your blood sugar? A few times a month  What time of day are you checking your blood sugars (select all that apply)?  Before meals  Have you had any blood sugars above 200?  No  Have you had any blood sugars below 70?  No    What symptoms do you notice when your blood sugar is low?  None    What concerns do you have today about your diabetes? None     Do you have any of these symptoms? (Select all that apply)  No numbness or tingling in feet.  No redness, sores or blisters " on feet.  No complaints of excessive thirst.  No reports of blurry vision.  No significant changes to weight.    Have you had a diabetic eye exam in the last 12 months? No                Hyperlipidemia Follow-Up      Are you regularly taking any medication or supplement to lower your cholesterol?   Yes- LIPITOR    Are you having muscle aches or other side effects that you think could be caused by your cholesterol lowering medication?  No    Hypertension Follow-up      Do you check your blood pressure regularly outside of the clinic? No     Are you following a low salt diet? Yes    Are your blood pressures ever more than 140 on the top number (systolic) OR more   than 90 on the bottom number (diastolic), for example 140/90? No    BP Readings from Last 2 Encounters:   07/01/19 119/79   06/06/19 120/80     Hemoglobin A1C (%)   Date Value   06/06/2019 6.5 (H)   10/23/2018 6.0 (H)     LDL Cholesterol Calculated (mg/dL)   Date Value   06/06/2019 109 (H)   10/23/2018 92         How many servings of fruits and vegetables do you eat daily?  0-1    On average, how many sweetened beverages do you drink each day (Examples: soda, juice, sweet tea, etc.  Do NOT count diet or artificially sweetened beverages)?   0    How many days per week do you exercise enough to make your heart beat faster? 7    How many minutes a day do you exercise enough to make your heart beat faster? 30 - 60    How many days per week do you miss taking your medication? 0

## 2020-07-28 ENCOUNTER — TELEPHONE (OUTPATIENT)
Dept: INTERNAL MEDICINE | Facility: CLINIC | Age: 50
End: 2020-07-28

## 2020-08-16 NOTE — PATIENT INSTRUCTIONS
Nephrology Progress Note    Patient feels better.      ROS:  Denies nausea, vomiting, chest pain, shortness of breath,     MEDICATIONS:  • metoPROLOL tartrate  25 mg Oral 2 times per day   • desmopressin  0.1 mg Oral Nightly   • albuterol  2.5 mg Nebulization 4x Daily Resp   • amLODIPine  5 mg Oral Daily   • sodium chloride (PF)  2 mL Intracatheter 2 times per day   • sodium polystyrene sulfonate  15 g Oral Once   • enoxaparin  40 mg Subcutaneous Q24H   • cloNIDine  0.2 mg Oral 2 times per day   • famotidine  20 mg Oral 2 times per day   • polyethylene glycol  17 g Oral Daily   • bacitracin   Topical BID          Physical Examination:  Vital Signs:    Visit Vitals  /69 (BP Location: RLE - Right lower extremity, Patient Position: Supine)   Pulse 83   Temp 97.9 °F (36.6 °C) (Oral)   Resp 16   Ht 5' 7\" (1.702 m)   Wt 109.3 kg (240 lb 15.4 oz)   SpO2 99%   BMI 37.74 kg/m²     General:  No acute distress, conversant  Eyes:  Normal conjunctivae  ENT:   Mucous membranes are moist.    Cardiovascular:  S1, S2 auscultated.  Regular rate and rhythm.  Bilateral peripheral pulses are intact.  trace edema.  Respiratory:  Decreased breath sounds in bases, symmetric chest wall expansion  Gastrointestinal:  Soft and nontender.  Non-distended.    Positive bowel sounds.    Genitourinary: No CVA tenderness  Musculoskeletal:  No joint swelling.    Skin: multiple ecchymosis and bruises noted   Neurologic:   AAO x3, follows commands       I/O's    Intake/Output Summary (Last 24 hours) at 8/16/2020 1411  Last data filed at 8/16/2020 1230  Gross per 24 hour   Intake 480 ml   Output 2100 ml   Net -1620 ml       Labs:    Recent Labs   Lab 08/16/20  0705 08/15/20  0549 08/14/20  0440 08/13/20  0716 08/12/20  0433   SODIUM 139 140 138 137 135   POTASSIUM 4.0 4.3 4.8 5.2* 4.8   CHLORIDE 109* 108* 108* 109* 107   CO2 23 24 24 22 23   BUN 28* 31* 33* 28* 25*   CREATININE 1.10 1.28* 1.39* 1.40* 1.38*   GLUCOSE 96 99 100* 93 98   CALCIUM 8.6  Recommendations from today's MTM visit:                                                      1. Schedule appointment with diabetes education for diet education    2. Check blood sugars once daily - before eating.  Prescription for test strips and lancets sent to the pharmacy today.    3. Increase metformin to 2 tablets daily - okay to take both tablets at the same time      Next MTM visit: 2 weeks    To schedule another MTM appointment, please call the clinic directly or you may call the MTM scheduling line at [phone] or toll-free at [phone].     My Clinical Pharmacist's contact information:                                                      It was a pleasure seeing you today!  Please feel free to contact me with any questions or concerns you have.      Devora Castaneda, PharmD  Pharmaceutical Care Resident   Pager: [phone]      You may receive a survey about the MTM services you received.  I would appreciate your feedback to help me serve you better in the future. Please fill it out and return it when you can. Your comments will be anonymous.             8.9 8.5 8.7 8.7      Recent Labs   Lab 08/16/20  0930  08/10/20  0810   WBC 17.8*   < > 15.3*   RBC 3.42*   < > 3.92*   HGB 10.6*   < > 12.2*   HCT 33.1*   < > 36.9*   *   < > 217   NEUT  --   --  84   LYMP  --   --  8   MON  --   --  2   EOS  --   --  3    < > = values in this interval not displayed.             Assessment:  1.  Acute kidney injury.  Renal function improving.  2.  Hypernatremia/hyperchloremia -resolved.  3.  Renal laceration/adrenal hemorrhage   4.  Hypertension.  5.  Subarachnoid hemorrhage      Recommendations:  1.  D/C desmopressin.  2.  Monitor urine output.  3.  Follow serum chemistries.

## 2020-08-20 ENCOUNTER — TELEPHONE (OUTPATIENT)
Dept: INTERNAL MEDICINE | Facility: CLINIC | Age: 50
End: 2020-08-20

## 2020-08-20 DIAGNOSIS — R80.9 MICROALBUMINURIA: ICD-10-CM

## 2020-08-20 DIAGNOSIS — E11.9 TYPE 2 DIABETES MELLITUS WITHOUT COMPLICATION, WITHOUT LONG-TERM CURRENT USE OF INSULIN (H): ICD-10-CM

## 2020-08-20 DIAGNOSIS — E78.5 HYPERLIPIDEMIA LDL GOAL <100: ICD-10-CM

## 2020-08-20 LAB
ALBUMIN SERPL-MCNC: 3.9 G/DL (ref 3.4–5)
ALP SERPL-CCNC: 73 U/L (ref 40–150)
ALT SERPL W P-5'-P-CCNC: 24 U/L (ref 0–70)
ANION GAP SERPL CALCULATED.3IONS-SCNC: 7 MMOL/L (ref 3–14)
AST SERPL W P-5'-P-CCNC: 17 U/L (ref 0–45)
BILIRUB SERPL-MCNC: 1 MG/DL (ref 0.2–1.3)
BUN SERPL-MCNC: 11 MG/DL (ref 7–30)
CALCIUM SERPL-MCNC: 8.8 MG/DL (ref 8.5–10.1)
CHLORIDE SERPL-SCNC: 106 MMOL/L (ref 94–109)
CHOLEST SERPL-MCNC: 183 MG/DL
CO2 SERPL-SCNC: 24 MMOL/L (ref 20–32)
CREAT SERPL-MCNC: 0.98 MG/DL (ref 0.66–1.25)
ERYTHROCYTE [DISTWIDTH] IN BLOOD BY AUTOMATED COUNT: 12.7 % (ref 10–15)
GFR SERPL CREATININE-BSD FRML MDRD: >90 ML/MIN/{1.73_M2}
GLUCOSE SERPL-MCNC: 140 MG/DL (ref 70–99)
HBA1C MFR BLD: 7.7 % (ref 0–5.6)
HCT VFR BLD AUTO: 43.3 % (ref 40–53)
HDLC SERPL-MCNC: 49 MG/DL
HGB BLD-MCNC: 14.6 G/DL (ref 13.3–17.7)
LDLC SERPL CALC-MCNC: 106 MG/DL
MCH RBC QN AUTO: 29.5 PG (ref 26.5–33)
MCHC RBC AUTO-ENTMCNC: 33.7 G/DL (ref 31.5–36.5)
MCV RBC AUTO: 88 FL (ref 78–100)
NONHDLC SERPL-MCNC: 134 MG/DL
PLATELET # BLD AUTO: 330 10E9/L (ref 150–450)
POTASSIUM SERPL-SCNC: 3.8 MMOL/L (ref 3.4–5.3)
PROT SERPL-MCNC: 8.1 G/DL (ref 6.8–8.8)
RBC # BLD AUTO: 4.95 10E12/L (ref 4.4–5.9)
SODIUM SERPL-SCNC: 137 MMOL/L (ref 133–144)
TRIGL SERPL-MCNC: 140 MG/DL
TSH SERPL DL<=0.005 MIU/L-ACNC: 1.26 MU/L (ref 0.4–4)
WBC # BLD AUTO: 8.3 10E9/L (ref 4–11)

## 2020-08-20 PROCEDURE — 83036 HEMOGLOBIN GLYCOSYLATED A1C: CPT | Performed by: INTERNAL MEDICINE

## 2020-08-20 PROCEDURE — 82043 UR ALBUMIN QUANTITATIVE: CPT | Performed by: INTERNAL MEDICINE

## 2020-08-20 PROCEDURE — 84443 ASSAY THYROID STIM HORMONE: CPT | Performed by: INTERNAL MEDICINE

## 2020-08-20 PROCEDURE — 80061 LIPID PANEL: CPT | Performed by: INTERNAL MEDICINE

## 2020-08-20 PROCEDURE — 36415 COLL VENOUS BLD VENIPUNCTURE: CPT | Performed by: INTERNAL MEDICINE

## 2020-08-20 PROCEDURE — 85027 COMPLETE CBC AUTOMATED: CPT | Performed by: INTERNAL MEDICINE

## 2020-08-20 PROCEDURE — 80053 COMPREHEN METABOLIC PANEL: CPT | Performed by: INTERNAL MEDICINE

## 2020-08-20 NOTE — TELEPHONE ENCOUNTER
Form received from Patient at the  for  [place] [employer] - Diabetic Form for review and signature.  Put in Dr. Garcia's in basket.

## 2020-08-21 LAB
CREAT UR-MCNC: 93 MG/DL
MICROALBUMIN UR-MCNC: 86 MG/L
MICROALBUMIN/CREAT UR: 92.34 MG/G CR (ref 0–17)

## 2020-08-24 NOTE — TELEPHONE ENCOUNTER
The previous form filled out from the 7/28/20 encounter was filled out for a short period of time.  Patient has since done lab work so can it be filled out again?

## 2020-09-03 ENCOUNTER — VIRTUAL VISIT (OUTPATIENT)
Dept: INTERNAL MEDICINE | Facility: CLINIC | Age: 50
End: 2020-09-03
Payer: COMMERCIAL

## 2020-09-03 DIAGNOSIS — E11.8 TYPE 2 DIABETES MELLITUS WITH COMPLICATION, WITHOUT LONG-TERM CURRENT USE OF INSULIN (H): ICD-10-CM

## 2020-09-03 PROCEDURE — 99213 OFFICE O/P EST LOW 20 MIN: CPT | Mod: 95 | Performed by: INTERNAL MEDICINE

## 2020-09-03 ASSESSMENT — ENCOUNTER SYMPTOMS
FREQUENCY: 0
FATIGUE: 0
SHORTNESS OF BREATH: 0

## 2020-09-03 NOTE — PATIENT INSTRUCTIONS
Patient Education     Diabetes: Caring for Your Body    When you have diabetes, your body needs special care. This care helps you stay healthy and prevent problems. Exercise and healthy eating are a part of this. You can also protect yourself by taking special care of your feet, skin, and teeth.  Caring for your feet  Follow these tips to help keep your feet healthy:    Check your feet every day for redness, blisters, cracks, dry skin, or numbness. Use a mirror to check the bottoms of your feet, if needed. Or ask for help.    Wash your feet in warm (not hot) water. Don t soak them.    Use an emery board to keep your toenails even with the ends of your toes. File away sharp edges. A foot doctor (podiatrist) may need to cut your toenails for you.    Smooth down calluses gently or wait until you next podiatry appointment.    Keep your skin soft and smooth by putting a thin layer of skin lotion on the tops and bottoms of your feet. Don't put lotion in between your toes.    Always wear shoes or slippers, even inside your home. Make sure that shoes are correctly fitted. Change your socks daily. Always check shoes for foreign objects before putting them on.    Call your healthcare provider right away if your feet are numb or painful. Also call your provider if a cut or sore doesn t heal in a few days.  Preventing skin infections  To prevent skin infections, bathe every day. Dry yourself well, especially between your toes. Wash any cuts with warm, soapy water. Cover with a sterile bandage. Call your healthcare provider if a cut or sore doesn't heal in a few days, feels warm, itches, is swollen, or has a bad smell.  Caring for your teeth  Follow these guidelines for healthy teeth:    Brush your teeth twice daily.    Floss your teeth daily.    See your dentist at least twice yearly.    Keep your blood sugar in a good range.  If you smoke, quit  Smoking is dangerous for everyone, especially people with diabetes. It can harm  the blood vessels in your eyes, kidneys, nerves, and heart. It raises blood pressure. Smoking can also slow healing, so infections are more likely. Ask your healthcare provider about programs to help you stop smoking.  Date Last Reviewed: 3/1/2016    8401-1021 [employer]. [address] [place] [address], [place] [address]. All rights reserved. This information is not intended as a substitute for professional medical care. Always follow your healthcare professional's instructions.

## 2020-09-03 NOTE — PROGRESS NOTES
"Norma RG Arcos is a 50 year old male who is being evaluated via a billable telephone visit.      The patient has been notified of following:     \"This telephone visit will be conducted via a call between you and your physician/provider. We have found that certain health care needs can be provided without the need for a physical exam.  This service lets us provide the care you need with a short phone conversation.  If a prescription is necessary we can send it directly to your pharmacy.  If lab work is needed we can place an order for that and you can then stop by our lab to have the test done at a later time.    Telephone visits are billed at different rates depending on your insurance coverage. During this emergency period, for some insurers they may be billed the same as an in-person visit.  Please reach out to your insurance provider with any questions.    If during the course of the call the physician/provider feels a telephone visit is not appropriate, you will not be charged for this service.\"    Patient has given verbal consent for Telephone visit?  Yes    What phone number would you like to be contacted at? [phone]     How would you like to obtain your AVS? Anthony DIEZ Isrrael is a 50 year old male who presents via phone visit today for the following health issues:    HPI   Using EWE .  Lot of background noise made it difficult to understand and talk to patient.    Patient is known DM, last A1c 7.7, he is tolerating metformin 1000mg once a day. Denies GI side effects.  Checks blood sugar few times a week.  Fasting and random 's. Denies hypoglycemic episodes.  Denies sugars less than 70 or more than 200.  Denies numbness and tingling in feet with DM.  He has been active and watching diet.  Takes losartan for albuminuria.   Never had DM eye exam.    Review of Systems   Constitutional: Negative for fatigue.   Eyes: Negative for visual disturbance. "   Respiratory: Negative for shortness of breath.    Cardiovascular: Negative for chest pain and peripheral edema.   Endocrine: Negative for cold intolerance.   Genitourinary: Negative for frequency.         Objective      Vitals:  No vitals were obtained today due to virtual visit.  PSYCH: Alert and oriented times 3; coherent speech, normal   rate and volume, able to articulate logical thoughts, able   to abstract reason, no tangential thoughts, no hallucinations   or delusions  His affect is normal  RESP: No cough, no audible wheezing, able to talk in full sentences  Remainder of exam unable to be completed due to telephone visits    Assessment/Plan:    Assessment & Plan     Foli was seen today for follow up.    Diagnoses and all orders for this visit:    Type 2 diabetes mellitus with complication, without long-term current use of insulin (H)    Sugar numbers looks better. Advised to continue same dose of metformin and follow-up with PCP as discussed.    Return in about 3 months (around 12/3/2020) for Physical Exam. Schedule with PCP.    Emily Ashley MD  [facility]    Phone call duration:  23 minutes

## 2021-01-20 DIAGNOSIS — E11.9 TYPE 2 DIABETES MELLITUS WITHOUT COMPLICATION, WITHOUT LONG-TERM CURRENT USE OF INSULIN (H): ICD-10-CM

## 2021-01-21 NOTE — TELEPHONE ENCOUNTER
Pending Prescriptions:                       Disp   Refills    CONTOUR NEXT TEST test strip [Pharmacy Med*       0        Sig: USE TO TEST BLOOD SUGARS 1 TIME DAILY    Microlet Lancets MISC [Pharmacy Med Name: *100 ea*0        Sig: USE TO TEST BLOOD SUGARS 1 TIME DAILY    Routing refill request to provider for review/approval because:  A break in medication

## 2021-01-22 RX ORDER — LANCETS
EACH MISCELLANEOUS
Qty: 100 EACH | Refills: 0 | Status: SHIPPED | OUTPATIENT
Start: 2021-01-22 | End: 2022-09-13

## 2021-03-04 ENCOUNTER — OFFICE VISIT (OUTPATIENT)
Dept: INTERNAL MEDICINE | Facility: CLINIC | Age: 51
End: 2021-03-04
Payer: COMMERCIAL

## 2021-03-04 VITALS
RESPIRATION RATE: 20 BRPM | SYSTOLIC BLOOD PRESSURE: 121 MMHG | OXYGEN SATURATION: 99 % | TEMPERATURE: 98.6 F | BODY MASS INDEX: 29.67 KG/M2 | HEIGHT: 64 IN | WEIGHT: 173.8 LBS | HEART RATE: 96 BPM | DIASTOLIC BLOOD PRESSURE: 78 MMHG

## 2021-03-04 DIAGNOSIS — E78.5 HYPERLIPIDEMIA LDL GOAL <100: ICD-10-CM

## 2021-03-04 DIAGNOSIS — Z00.00 ROUTINE GENERAL MEDICAL EXAMINATION AT A HEALTH CARE FACILITY: Primary | ICD-10-CM

## 2021-03-04 DIAGNOSIS — E11.9 TYPE 2 DIABETES MELLITUS WITHOUT COMPLICATION, WITHOUT LONG-TERM CURRENT USE OF INSULIN (H): ICD-10-CM

## 2021-03-04 DIAGNOSIS — R80.9 MICROALBUMINURIA: ICD-10-CM

## 2021-03-04 LAB
ERYTHROCYTE [DISTWIDTH] IN BLOOD BY AUTOMATED COUNT: 12.4 % (ref 10–15)
HBA1C MFR BLD: 10.1 % (ref 0–5.6)
HCT VFR BLD AUTO: 42.4 % (ref 40–53)
HGB BLD-MCNC: 14.1 G/DL (ref 13.3–17.7)
MCH RBC QN AUTO: 29.4 PG (ref 26.5–33)
MCHC RBC AUTO-ENTMCNC: 33.3 G/DL (ref 31.5–36.5)
MCV RBC AUTO: 88 FL (ref 78–100)
PLATELET # BLD AUTO: 315 10E9/L (ref 150–450)
RBC # BLD AUTO: 4.8 10E12/L (ref 4.4–5.9)
WBC # BLD AUTO: 7.1 10E9/L (ref 4–11)

## 2021-03-04 PROCEDURE — 80061 LIPID PANEL: CPT | Performed by: INTERNAL MEDICINE

## 2021-03-04 PROCEDURE — 84443 ASSAY THYROID STIM HORMONE: CPT | Performed by: INTERNAL MEDICINE

## 2021-03-04 PROCEDURE — 85027 COMPLETE CBC AUTOMATED: CPT | Performed by: INTERNAL MEDICINE

## 2021-03-04 PROCEDURE — 83036 HEMOGLOBIN GLYCOSYLATED A1C: CPT | Performed by: INTERNAL MEDICINE

## 2021-03-04 PROCEDURE — 36415 COLL VENOUS BLD VENIPUNCTURE: CPT | Performed by: INTERNAL MEDICINE

## 2021-03-04 PROCEDURE — 99214 OFFICE O/P EST MOD 30 MIN: CPT | Mod: 25 | Performed by: INTERNAL MEDICINE

## 2021-03-04 PROCEDURE — 80053 COMPREHEN METABOLIC PANEL: CPT | Performed by: INTERNAL MEDICINE

## 2021-03-04 PROCEDURE — 82043 UR ALBUMIN QUANTITATIVE: CPT | Performed by: INTERNAL MEDICINE

## 2021-03-04 PROCEDURE — 99396 PREV VISIT EST AGE 40-64: CPT | Performed by: INTERNAL MEDICINE

## 2021-03-04 RX ORDER — METFORMIN HCL 500 MG
TABLET, EXTENDED RELEASE 24 HR ORAL
Qty: 180 TABLET | Refills: 0 | Status: SHIPPED | OUTPATIENT
Start: 2021-03-04 | End: 2021-07-14

## 2021-03-04 RX ORDER — GLUCOSAMINE HCL/CHONDROITIN SU 500-400 MG
CAPSULE ORAL
Qty: 100 EACH | Refills: 1 | Status: SHIPPED | OUTPATIENT
Start: 2021-03-04

## 2021-03-04 RX ORDER — LOSARTAN POTASSIUM 25 MG/1
25 TABLET ORAL DAILY
Qty: 90 TABLET | Refills: 0 | Status: SHIPPED | OUTPATIENT
Start: 2021-03-04 | End: 2021-04-23

## 2021-03-04 RX ORDER — LANCETS
EACH MISCELLANEOUS
Qty: 100 EACH | Refills: 1 | Status: SHIPPED | OUTPATIENT
Start: 2021-03-04

## 2021-03-04 RX ORDER — ATORVASTATIN CALCIUM 40 MG/1
40 TABLET, FILM COATED ORAL DAILY
Qty: 90 TABLET | Refills: 0 | Status: SHIPPED | OUTPATIENT
Start: 2021-03-04 | End: 2021-07-28

## 2021-03-04 RX ORDER — GLIPIZIDE 2.5 MG/1
2.5 TABLET, EXTENDED RELEASE ORAL DAILY
Qty: 90 TABLET | Refills: 0 | Status: SHIPPED | OUTPATIENT
Start: 2021-03-04 | End: 2021-03-07

## 2021-03-04 ASSESSMENT — MIFFLIN-ST. JEOR: SCORE: 1561.41

## 2021-03-04 NOTE — PROGRESS NOTES
Dr Garcia's note    Patient's instructions / PLAN:                                                        Plan:  1.  Labs today - [address]   2. Add Glipizide 2.5 mg daily for the diabetes  3. Continue the other meds, same doses for now.  4. Make an appointment with the eye doctor   5. Make a follow up in May, bring the blood sugars      ASSESSMENT & PLAN:                                                      (Z00.00) Routine general medical examination at a health care facility  (primary encounter diagnosis)  Comment:   Plan: CBC with platelets, TSH with free T4 reflex,         Lipid panel reflex to direct LDL Fasting,         Albumin Random Urine Quantitative with Creat         Ratio, Comprehensive metabolic panel,         Hemoglobin A1c            (E11.9) DM 2, no insulin (H)  Comment:   Plan: glipiZIDE (GLUCOTROL XL) 2.5 MG 24 hr tablet,         losartan (COZAAR) 25 MG tablet, metFORMIN         (GLUCOPHAGE-XR) 500 MG 24 hr tablet, blood         glucose monitoring (NO BRAND SPECIFIED) meter         device kit, blood glucose monitoring (NO BRAND         SPECIFIED) meter device kit, blood glucose (NO         BRAND SPECIFIED) test strip, blood glucose         calibration (NO BRAND SPECIFIED) solution, thin        (NO BRAND SPECIFIED) lancets, alcohol swab prep        pads, EYE ADULT REFERRAL            (E78.5) Hyperlipidemia LDL goal <100  Comment:   Plan: atorvastatin (LIPITOR) 40 MG tablet      (R80.9) Microalbuminuria  Comment:   Plan: losartan (COZAAR) 25 MG tablet               Chief Complaint:                                                      Annual exam  Follow up chronic medical problems    Due to language barrier, an  was present during the history-taking and subsequent discussion (and for part of the physical exam) with this patient.     SUBJECTIVE:                                                    History of present illness     We reviewed the chronic medical problems as above.   I  reviewed the recent tests results in Epic       BS [id number]    ROS:     General: Negative for fever, chills, major weight changes, fatigue  Skin: Negative for rashes, abnormal spots  Eyes: Negative for blurred or double vision  ENT/mouth: Negative for sinuses discomfort, earache, sore throat  Respiratory: Negative for cough, wheezes, chronic lung disease  Cardiovascular: Negative for rest or exertional chest pain, shortness of breath, palpitations, leg edema,   Gastrointestinal: Negative for vomiting, abdominal pain, heartburn, blood in stool, diarrhea, constipation  Genitourinary: Negative for urinary frequency, blood in urine, history of kidney stones  Male: Negative for difficulty urinating  Neuro: Negative for headaches, numbness, tingling, weakness in arms or legs, history of seizure, recent syncope  Psychiatry: Negative for depression, anxiety, suicidal thoughts  Endo: Negative for known thyroid disease, pos for diabetes.  Hemato/Lymph: Negative for nodes, easy bleeding, history of DVT, blood transfusion  Musculoskeletal: Negative for joint swelling, back pain    PMHx: - reviewed  Past Medical History:   Diagnosis Date     Hyperlipidemia LDL goal <100 6/8/2019     Type 2 diabetes mellitus without complication, with long-term current use of insulin (H) 5/1/2018         PSHx: reviewed  History reviewed. No pertinent surgical history.     Soc Hx: No daily alcohol, no smoking  Social History     Socioeconomic History     Marital status:      Spouse name: Not on file     Number of children: Not on file     Years of education: Not on file     Highest education level: Not on file   Occupational History     Not on file   Social Needs     Financial resource strain: Not on file     Food insecurity     Worry: Not on file     Inability: Not on file     Transportation needs     Medical: Not on file     Non-medical: Not on file   Tobacco Use     Smoking status: Never Smoker     Smokeless tobacco: Never Used   Substance  and Sexual Activity     Alcohol use: Yes     Comment: Socially     Drug use: No     Sexual activity: Not on file   Lifestyle     Physical activity     Days per week: Not on file     Minutes per session: Not on file     Stress: Not on file   Relationships     Social connections     Talks on phone: Not on file     Gets together: Not on file     Attends Restorationism service: Not on file     Active member of club or organization: Not on file     Attends meetings of clubs or organizations: Not on file     Relationship status: Not on file     Intimate partner violence     Fear of current or ex partner: Not on file     Emotionally abused: Not on file     Physically abused: Not on file     Forced sexual activity: Not on file   Other Topics Concern     Parent/sibling w/ CABG, MI or angioplasty before 65F 55M? Not Asked   Social History Narrative     Not on file        Fam Hx: reviewed  Family History   Problem Relation Age of Onset     Hypertension Mother      Diabetes Other      No Known Problems Father      No Known Problems Maternal Grandmother      No Known Problems Maternal Grandfather      No Known Problems Paternal Grandmother      No Known Problems Paternal Grandfather          Screening: reviewed    All: reviewed    Meds: reviewed  Current Outpatient Medications   Medication Sig Dispense Refill     ASPIRIN LOW DOSE 81 MG EC tablet TAKE 1 TABLET (81 MG) BY MOUTH DAILY 30 tablet 0     atorvastatin (LIPITOR) 40 MG tablet Take 1 tablet (40 mg) by mouth daily 30 tablet 0     blood glucose calibration (CONTOUR NEXT CONTROL LOW VI SOLN) Low solution Use to calibrate blood glucose monitor as directed. 1 Bottle 3     blood glucose monitoring (CONTOUR NEXT MONITOR W/DEVICE KIT) meter device kit Use to test blood sugar 1 times daily or as directed. 1 kit 0     CONTOUR NEXT TEST test strip USE TO TEST BLOOD SUGARS 1 TIME DAILY 100 strip 0     losartan (COZAAR) 25 MG tablet Take 1 tablet (25 mg) by mouth daily 30 tablet 2      "metFORMIN (GLUCOPHAGE-XR) 500 MG 24 hr tablet Take 2 tablets (1,000 mg) by mouth 2 times daily with meals 60 tablet 0     Microlet Lancets MISC USE TO TEST BLOOD SUGARS 1 TIME DAILY 100 each 0     ibuprofen (ADVIL/MOTRIN) 200 MG tablet Take 400 mg by mouth daily as needed for mild pain             OBJECTIVE:                                                    Physical Exam :    Blood pressure 121/78, pulse 96, temperature 98.6  F (37  C), temperature source Oral, resp. rate 20, height 1.613 m (5' 3.5\"), weight 78.8 kg (173 lb 12.8 oz), SpO2 99 %.   NAD, appears comfortable  Skin clear, no rashes  Neck: supple, no JVD,  no thyroidmegaly  Lymph nodes non palpable in the cervical, supraclavicular axillaries,   Chest: clear to auscultation with good respiratory effort  Cardiac: S1S2, RRR, no mgr appreciated  Abdomen: soft, not tender, not distended, audible bowel sound, no hepatosplenomegaly, no palpable masses, no abdominal bruits  Extremities: no cyanosis, clubbing or edema.   Neuro: A, Ox3, no focal signs.        Katarina Garcia MD  Internal Medicine     SUBJECTIVE:   CC: Norma Arcos is an 50 year old male who presents for preventative health visit.       Patient has been advised of split billing requirements and indicates understanding: Yes  Healthy Habits:     Getting at least 3 servings of Calcium per day:  Yes    Bi-annual eye exam:  NO    Dental care twice a year:  NO    Sleep apnea or symptoms of sleep apnea:  Sleep apnea    Diet:  Regular (no restrictions)    Frequency of exercise:  2-3 days/week    Duration of exercise:  15-30 minutes    Taking medications regularly:  Yes    Medication side effects:  None    PHQ-2 Total Score: 0    Additional concerns today:  No    Ability to successfully perform activities of daily living: Yes, no assistance needed  Home safety:  none identified   Hearing impairment: None        Diabetes Follow-up    How often are you checking your blood sugar? A few times a week  What time " of day are you checking your blood sugars (select all that apply)?  After meals  Have you had any blood sugars above 200?  Yes   Have you had any blood sugars below 70?  No    What symptoms do you notice when your blood sugar is low?  None    What concerns do you have today about your diabetes? None     Do you have any of these symptoms? (Select all that apply)  No numbness or tingling in feet.  No redness, sores or blisters on feet.  No complaints of excessive thirst.  No reports of blurry vision.  No significant changes to weight.    Have you had a diabetic eye exam in the last 12 months? No                Hyperlipidemia Follow-Up      Are you regularly taking any medication or supplement to lower your cholesterol?   Yes- atorvastatin    Are you having muscle aches or other side effects that you think could be caused by your cholesterol lowering medication?  No    Hypertension Follow-up      Do you check your blood pressure regularly outside of the clinic? No     Are you following a low salt diet? No    Are your blood pressures ever more than 140 on the top number (systolic) OR more   than 90 on the bottom number (diastolic), for example 140/90? No    BP Readings from Last 2 Encounters:   07/01/19 119/79   06/06/19 120/80     Hemoglobin A1C (%)   Date Value   08/20/2020 7.7 (H)   06/06/2019 6.5 (H)     LDL Cholesterol Calculated (mg/dL)   Date Value   08/20/2020 106 (H)   06/06/2019 109 (H)         Today's PHQ-2 Score:   PHQ-2 ( 1999 [employer]) 3/4/2021   Q1: Little interest or pleasure in doing things 0   Q2: Feeling down, depressed or hopeless 0   PHQ-2 Score 0   Q1: Little interest or pleasure in doing things Not at all   Q2: Feeling down, depressed or hopeless Not at all   PHQ-2 Score 0       Abuse: Current or Past(Physical, Sexual or Emotional)- No  Do you feel safe in your environment? Yes    Have you ever done Advance Care Planning? (For example, a Health Directive, POLST, or a discussion with a medical  "provider or your loved ones about your wishes): No, advance care planning information given to patient to review.  Patient plans to discuss their wishes with loved ones or provider.      Social History     Tobacco Use     Smoking status: Never Smoker     Smokeless tobacco: Never Used   Substance Use Topics     Alcohol use: Yes     Comment: Socially     If you drink alcohol do you typically have >3 drinks per day or >7 drinks per week? No    Alcohol Use 3/4/2021   Prescreen: >3 drinks/day or >7 drinks/week? Not Applicable   Prescreen: >3 drinks/day or >7 drinks/week? -   No flowsheet data found.    Last PSA:   PSA   Date Value Ref Range Status   03/26/2018 0.30 0 - 4 ug/L Final     Comment:     Assay Method:  Chemiluminescence using Siemens Vista analyzer       Reviewed orders with patient. Reviewed health maintenance and updated orders accordingly - Yes  Labs reviewed in EPIC    Reviewed and updated as needed this visit by clinical staff                 Reviewed and updated as needed this visit by Provider                    Review of Systems          Patient has been advised of split billing requirements and indicates understanding: Yes At the check in, at the    COUNSELING:   Reviewed preventive health counseling, as reflected in patient instructions       Regular exercise       Healthy diet/nutrition    Estimated body mass index is 31.34 kg/m  as calculated from the following:    Height as of 6/6/19: 1.6 m (5' 3\").    Weight as of 7/1/19: 80.2 kg (176 lb 14.4 oz).         He reports that he has never smoked. He has never used smokeless tobacco.      Counseling Resources:  ATP IV Guidelines  Pooled Cohorts Equation Calculator  FRAX Risk Assessment  ICSI Preventive Guidelines  Dietary Guidelines for Americans, 2010  USDA's MyPlate  ASA Prophylaxis  Lung CA Screening    Katarina Shaffer MD  [facility]        "

## 2021-03-04 NOTE — NURSING NOTE
"/78 (BP Location: Right arm, Patient Position: Sitting, Cuff Size: Adult Regular)   Pulse 96   Temp 98.6  F (37  C) (Oral)   Resp 20   Ht 1.613 m (5' 3.5\")   Wt 78.8 kg (173 lb 12.8 oz)   SpO2 99%   BMI 30.30 kg/m      "

## 2021-03-05 LAB
ALBUMIN SERPL-MCNC: 3.9 G/DL (ref 3.4–5)
ALP SERPL-CCNC: 76 U/L (ref 40–150)
ALT SERPL W P-5'-P-CCNC: 30 U/L (ref 0–70)
ANION GAP SERPL CALCULATED.3IONS-SCNC: 4 MMOL/L (ref 3–14)
AST SERPL W P-5'-P-CCNC: 15 U/L (ref 0–45)
BILIRUB SERPL-MCNC: 1.1 MG/DL (ref 0.2–1.3)
BUN SERPL-MCNC: 7 MG/DL (ref 7–30)
CALCIUM SERPL-MCNC: 9.7 MG/DL (ref 8.5–10.1)
CHLORIDE SERPL-SCNC: 105 MMOL/L (ref 94–109)
CHOLEST SERPL-MCNC: 120 MG/DL
CO2 SERPL-SCNC: 29 MMOL/L (ref 20–32)
CREAT SERPL-MCNC: 0.91 MG/DL (ref 0.66–1.25)
CREAT UR-MCNC: 181 MG/DL
GFR SERPL CREATININE-BSD FRML MDRD: >90 ML/MIN/{1.73_M2}
GLUCOSE SERPL-MCNC: 161 MG/DL (ref 70–99)
HDLC SERPL-MCNC: 48 MG/DL
LDLC SERPL CALC-MCNC: 59 MG/DL
MICROALBUMIN UR-MCNC: 122 MG/L
MICROALBUMIN/CREAT UR: 67.4 MG/G CR (ref 0–17)
NONHDLC SERPL-MCNC: 72 MG/DL
POTASSIUM SERPL-SCNC: 3.9 MMOL/L (ref 3.4–5.3)
PROT SERPL-MCNC: 8.1 G/DL (ref 6.8–8.8)
SODIUM SERPL-SCNC: 138 MMOL/L (ref 133–144)
TRIGL SERPL-MCNC: 66 MG/DL
TSH SERPL DL<=0.005 MIU/L-ACNC: 1.2 MU/L (ref 0.4–4)

## 2021-03-07 DIAGNOSIS — E11.9 TYPE 2 DIABETES MELLITUS WITHOUT COMPLICATION, WITHOUT LONG-TERM CURRENT USE OF INSULIN (H): ICD-10-CM

## 2021-03-07 RX ORDER — GLIPIZIDE 2.5 MG/1
2.5 TABLET, EXTENDED RELEASE ORAL 2 TIMES DAILY
Qty: 60 TABLET | Refills: 2 | Status: SHIPPED | OUTPATIENT
Start: 2021-03-07 | End: 2021-11-16

## 2021-03-26 ENCOUNTER — TELEPHONE (OUTPATIENT)
Dept: INTERNAL MEDICINE | Facility: CLINIC | Age: 51
End: 2021-03-26

## 2021-03-26 NOTE — TELEPHONE ENCOUNTER
Fax received from [facility] stating patient reported that MD was going to send new prescription for the meter for his phone. Requesting provider send new prescription for the one that was discussed. Per fax unsure if dexcom,? Freestyle viola?

## 2021-04-22 DIAGNOSIS — R80.9 MICROALBUMINURIA: ICD-10-CM

## 2021-04-22 DIAGNOSIS — E11.9 TYPE 2 DIABETES MELLITUS WITHOUT COMPLICATION, WITHOUT LONG-TERM CURRENT USE OF INSULIN (H): ICD-10-CM

## 2021-04-23 RX ORDER — LOSARTAN POTASSIUM 25 MG/1
25 TABLET ORAL DAILY
Qty: 90 TABLET | Refills: 2 | Status: SHIPPED | OUTPATIENT
Start: 2021-04-23 | End: 2021-11-16

## 2021-04-23 NOTE — TELEPHONE ENCOUNTER
Pending Prescriptions:                       Disp   Refills    losartan (COZAAR) 25 MG tablet            90 tab*2            Sig: Take 1 tablet (25 mg) by mouth daily    Prescription approved per [facility] Refill Protocol.

## 2021-07-14 DIAGNOSIS — E11.9 TYPE 2 DIABETES MELLITUS WITHOUT COMPLICATION, WITHOUT LONG-TERM CURRENT USE OF INSULIN (H): ICD-10-CM

## 2021-07-14 RX ORDER — METFORMIN HCL 500 MG
TABLET, EXTENDED RELEASE 24 HR ORAL
Qty: 60 TABLET | Refills: 1 | Status: SHIPPED | OUTPATIENT
Start: 2021-07-14 | End: 2021-11-16

## 2021-07-28 DIAGNOSIS — E78.5 HYPERLIPIDEMIA LDL GOAL <100: ICD-10-CM

## 2021-07-28 RX ORDER — ATORVASTATIN CALCIUM 40 MG/1
40 TABLET, FILM COATED ORAL DAILY
Qty: 90 TABLET | Refills: 3 | Status: SHIPPED | OUTPATIENT
Start: 2021-07-28 | End: 2021-11-16

## 2021-08-18 DIAGNOSIS — E11.9 TYPE 2 DIABETES MELLITUS WITHOUT COMPLICATION, WITHOUT LONG-TERM CURRENT USE OF INSULIN (H): ICD-10-CM

## 2021-08-18 NOTE — TELEPHONE ENCOUNTER
Pending Prescriptions:                       Disp   Refills    glipiZIDE (GLUCOTROL XL) 2.5 MG 24 hr tab*60 tab*2            Sig: Take 1 tablet (2.5 mg) by mouth 2 times daily    Routing refill request to provider for review/approval because:  A break in medication

## 2021-08-19 RX ORDER — GLIPIZIDE 2.5 MG/1
2.5 TABLET, EXTENDED RELEASE ORAL 2 TIMES DAILY
Qty: 60 TABLET | Refills: 2 | OUTPATIENT
Start: 2021-08-19

## 2021-11-08 DIAGNOSIS — E11.9 TYPE 2 DIABETES MELLITUS WITHOUT COMPLICATION, WITHOUT LONG-TERM CURRENT USE OF INSULIN (H): ICD-10-CM

## 2021-11-08 DIAGNOSIS — R80.9 MICROALBUMINURIA: ICD-10-CM

## 2021-11-08 NOTE — TELEPHONE ENCOUNTER
Reason for Call: Request for an order or referral: Refill    Order or referral being requested: Patient needs Metform and  Losartan 25mg refill to last him until his luz    Date needed: as soon as possible    Has the patient been seen by the PCP for this problem? YES    Additional comments: Please call patient when medication been refill. He got no pill left.    Phone number Patient can be reached at:  Cell number on file:    Telephone Information:   Mobile [phone]       Best Time:  Anytime    Can we leave a detailed message on this number?  YES    Call taken on 11/8/2021 at 3:51 PM by Clarek Gutierrez

## 2021-11-09 RX ORDER — METFORMIN HCL 500 MG
TABLET, EXTENDED RELEASE 24 HR ORAL
Qty: 60 TABLET | Refills: 1 | OUTPATIENT
Start: 2021-11-09

## 2021-11-09 RX ORDER — LOSARTAN POTASSIUM 25 MG/1
25 TABLET ORAL DAILY
Qty: 90 TABLET | Refills: 2 | OUTPATIENT
Start: 2021-11-09

## 2021-11-16 ENCOUNTER — OFFICE VISIT (OUTPATIENT)
Dept: FAMILY MEDICINE | Facility: CLINIC | Age: 51
End: 2021-11-16
Payer: COMMERCIAL

## 2021-11-16 VITALS
HEART RATE: 72 BPM | WEIGHT: 173 LBS | BODY MASS INDEX: 29.53 KG/M2 | TEMPERATURE: 98.1 F | HEIGHT: 64 IN | OXYGEN SATURATION: 99 % | SYSTOLIC BLOOD PRESSURE: 118 MMHG | DIASTOLIC BLOOD PRESSURE: 74 MMHG | RESPIRATION RATE: 16 BRPM

## 2021-11-16 DIAGNOSIS — M54.12 CERVICAL RADICULOPATHY: ICD-10-CM

## 2021-11-16 DIAGNOSIS — E11.8 TYPE 2 DIABETES MELLITUS WITH COMPLICATION, WITHOUT LONG-TERM CURRENT USE OF INSULIN (H): ICD-10-CM

## 2021-11-16 DIAGNOSIS — M54.2 NECK PAIN: Primary | ICD-10-CM

## 2021-11-16 DIAGNOSIS — E78.5 HYPERLIPIDEMIA LDL GOAL <100: ICD-10-CM

## 2021-11-16 DIAGNOSIS — R80.9 MICROALBUMINURIA: ICD-10-CM

## 2021-11-16 PROBLEM — N18.2 CHRONIC KIDNEY DISEASE, STAGE 2 (MILD): Status: ACTIVE | Noted: 2021-11-16

## 2021-11-16 PROCEDURE — S3000 BILAT DIL RETINAL EXAM: HCPCS | Performed by: FAMILY MEDICINE

## 2021-11-16 PROCEDURE — 99214 OFFICE O/P EST MOD 30 MIN: CPT | Performed by: FAMILY MEDICINE

## 2021-11-16 PROCEDURE — 99207 PR FOOT EXAM NO CHARGE: CPT | Performed by: FAMILY MEDICINE

## 2021-11-16 RX ORDER — ATORVASTATIN CALCIUM 40 MG/1
40 TABLET, FILM COATED ORAL DAILY
Qty: 90 TABLET | Refills: 1 | Status: SHIPPED | OUTPATIENT
Start: 2021-11-16 | End: 2022-03-22

## 2021-11-16 RX ORDER — LOSARTAN POTASSIUM 25 MG/1
25 TABLET ORAL DAILY
Qty: 90 TABLET | Refills: 1 | Status: SHIPPED | OUTPATIENT
Start: 2021-11-16 | End: 2022-03-22

## 2021-11-16 RX ORDER — GLIPIZIDE 2.5 MG/1
2.5 TABLET, EXTENDED RELEASE ORAL 2 TIMES DAILY
Qty: 180 TABLET | Refills: 0 | Status: SHIPPED | OUTPATIENT
Start: 2021-11-16 | End: 2022-02-14

## 2021-11-16 RX ORDER — METFORMIN HCL 500 MG
TABLET, EXTENDED RELEASE 24 HR ORAL
Qty: 360 TABLET | Refills: 1 | Status: SHIPPED | OUTPATIENT
Start: 2021-11-16 | End: 2022-03-22

## 2021-11-16 ASSESSMENT — ENCOUNTER SYMPTOMS
NECK STIFFNESS: 1
ABDOMINAL PAIN: 0
FATIGUE: 0
NUMBNESS: 1
BACK PAIN: 0
DYSURIA: 0
PARESTHESIAS: 1
NECK PAIN: 1
ADENOPATHY: 0

## 2021-11-16 ASSESSMENT — MIFFLIN-ST. JEOR: SCORE: 1552.78

## 2021-11-16 NOTE — PROGRESS NOTES
"  Assessment & Plan     Type 2 diabetes mellitus with complication, without long-term current use of insulin (H)  Diabetes uncontrolled currently not taking any medications ran  out of medication.  Discussed dietary modification  Discussed low-cholesterol low sugar diet.  Recommend to add Metformin and glipizide      Plan to recheck A1c in 1 month after patient start medication    Clinic follow-up appointment made.    - metFORMIN (GLUCOPHAGE-XR) 500 MG 24 hr tablet; Take 2 tablets (1,000 mg) by mouth 2 times daily with meals  -- For further refills patient needs appointment  - glipiZIDE (GLUCOTROL XL) 2.5 MG 24 hr tablet; Take 1 tablet (2.5 mg) by mouth 2 times daily  - FOOT EXAM  - DIABETIC INDICATOR; RETINAL EYE EXAM, DILATED, BILATERAL  - [facility] Adult Diabetes Educator Referral; Future  - Hemoglobin A1c; Future  - Basic metabolic panel  (Ca, Cl, CO2, Creat, Gluc, K, Na, BUN); Future  - Lipid panel reflex to direct LDL Fasting; Future  - Albumin Random Urine Quantitative with Creat Ratio; Future    Neck pain  -Patient describe experiencing some pain in the neck  Neck pain radiating to left arm occasionally to left leg off and on.  No Pain today.  Weakness in upper and lower extremities    I suspect cervical radiculopathy left arm along with neck pain  Start with physical therapy  If no response can consider MRI scan C-spine.  - Physical Therapy Referral; Future    Cervical radiculopathy  - Physical Therapy Referral; Future    Hyperlipidemia LDL goal <100  - atorvastatin (LIPITOR) 40 MG tablet; Take 1 tablet (40 mg) by mouth daily    Microalbuminuria  - losartan (COZAAR) 25 MG tablet; Take 1 tablet (25 mg) by mouth daily      - losartan (COZAAR) 25 MG tablet; Take 1 tablet (25 mg) by mouth daily         BMI:   Estimated body mass index is 30.16 kg/m  as calculated from the following:    Height as of this encounter: 1.613 m (5' 3.5\").    Weight as of this encounter: 78.5 kg (173 lb).   Weight management plan: Discussed " healthy diet and exercise guidelines      Return in about 4 weeks (around 12/14/2021) for Follow up.    Taylor Fortune MD  [facility] is a 51 year old who presents for the following health issues     HPI     Diabetes Follow-up    How often are you checking your blood sugar? A few times a week  What time of day are you checking your blood sugars (select all that apply)?  Before meals  Have you had any blood sugars above 200?  Yes without his medication   Have you had any blood sugars below 70?  No    What symptoms do you notice when your blood sugar is low?  None    What concerns do you have today about your diabetes? Blood sugar is often over 200 and getting refills     Do you have any of these symptoms? (Select all that apply)  No numbness or tingling in feet.  No redness, sores or blisters on feet.  No complaints of excessive thirst.  No reports of blurry vision.  No significant changes to weight.    Have you had a diabetic eye exam in the last 12 months? No        BP Readings from Last 2 Encounters:   11/16/21 118/74   03/04/21 121/78     Hemoglobin A1C (%)   Date Value   03/04/2021 10.1 (H)   08/20/2020 7.7 (H)     LDL Cholesterol Calculated (mg/dL)   Date Value   03/04/2021 59   08/20/2020 106 (H)           How many servings of fruits and vegetables do you eat daily?  0-1    On average, how many sweetened beverages do you drink each day (Examples: soda, juice, sweet tea, etc.  Do NOT count diet or artificially sweetened beverages)?   0    How many days per week do you exercise enough to make your heart beat faster? 3 or less    How many minutes a day do you exercise enough to make your heart beat faster? 60 or more  How many days per week do you miss taking your medication? 7    What makes it hard for you to take your medications?  not having the medication        Review of Systems   Constitutional: Negative for fatigue.   Gastrointestinal: Negative for abdominal pain.  "  Genitourinary: Negative for dysuria.   Musculoskeletal: Positive for neck pain and neck stiffness. Negative for back pain.   Neurological: Positive for numbness and paresthesias.   Hematological: Negative for adenopathy.   Psychiatric/Behavioral: Negative for behavioral problems.            Objective    /74 (BP Location: Right arm, Patient Position: Sitting, Cuff Size: Adult Regular)   Pulse 72   Temp 98.1  F (36.7  C) (Oral)   Resp 16   Ht 1.613 m (5' 3.5\")   Wt 78.5 kg (173 lb)   SpO2 99%   BMI 30.16 kg/m    Body mass index is 30.16 kg/m .  Physical Exam  Vitals and nursing note reviewed.   HENT:      Mouth/Throat:      Mouth: Mucous membranes are moist.   Neck:      Comments: Neck stiffness   Cardiovascular:      Pulses: Normal pulses.      Heart sounds: Normal heart sounds.   Pulmonary:      Effort: Pulmonary effort is normal.      Breath sounds: Normal breath sounds.   Abdominal:      General: Abdomen is flat. Bowel sounds are normal.      Palpations: Abdomen is soft.   Musculoskeletal:      Cervical back: Normal range of motion and neck supple.   Skin:     General: Skin is warm.      Comments: Foot exam normal   Neurological:      Mental Status: He is alert.                "

## 2021-11-23 ENCOUNTER — TELEPHONE (OUTPATIENT)
Dept: FAMILY MEDICINE | Facility: CLINIC | Age: 51
End: 2021-11-23
Payer: COMMERCIAL

## 2021-11-23 DIAGNOSIS — E11.9 TYPE 2 DIABETES MELLITUS WITHOUT COMPLICATION, WITHOUT LONG-TERM CURRENT USE OF INSULIN (H): ICD-10-CM

## 2021-11-23 NOTE — TELEPHONE ENCOUNTER
I met with Foli last week when he saw Dr. Fortune and I introduced myself as his PAL. He is following up with me today as he needs scheduling for eye exam and lab and follow up appointment with Dr. Fortune.  He wanted to check his schedule first before setting up these appointments. Call placed to him, left detailed message to return call to me, and I will try again tomorrow morning. Chaparrita Guerrero R.N.

## 2021-11-24 NOTE — TELEPHONE ENCOUNTER
Patient Contact    Attempt # 3--phone.     Was call answered?  No.  Left message on voicemail with information to call me back. If I do not hear back today, I will follow up next week when in clinic. Chaparrita Guerrero R.N.

## 2021-11-24 NOTE — TELEPHONE ENCOUNTER
Second call placed to [facility]. No answer, left message to return call to me. Chaparrita Guerrero R.N.

## 2021-11-30 NOTE — TELEPHONE ENCOUNTER
Returned call to [facility] to see if he needed meter only or all supplies. He has an accuchek meter and only needs testing strips at this time. Per Epic looks like meter was sent in 3/2021.     Strip order pended. Please review, frequency of testing 2 times per day pended.     Chaparrita Guerrero R.N.

## 2021-11-30 NOTE — TELEPHONE ENCOUNTER
Norma returned call to me.   Call to [facility] to scheduled Norma for diabetic eye exam.   I spoke to Rajat--[facility], scheduled for first available on Jan 13th. Norma does not have 2022 work schedule and will try to work around this and knows to call me back if it needs to be rescheduled. Eye form also faxed to [facility] at [phone].     I have scheduled Norma for recheck with Dr. Fortune on 12/14, he is also scheduled for that day in am with lab for fasting labs. He did not want to schedule labs one day before but wanted same day earlier lab.      Also Norma is asking for new glucose testing supplies, as he is out. Looks like was ordered for testing once per day by previous PCP.     Dr. Fortune would you still like patient to test blood sugar once daily? Or does he not need to check at home since not on insulin? Please advise.     Thank you,  Chaparrita Guerrero R.N.

## 2021-11-30 NOTE — TELEPHONE ENCOUNTER
We could try to send testing twice a day .  Not sure if insurance cover , can you please pend orders .

## 2021-12-03 ENCOUNTER — VIRTUAL VISIT (OUTPATIENT)
Dept: EDUCATION SERVICES | Facility: CLINIC | Age: 51
End: 2021-12-03
Attending: FAMILY MEDICINE
Payer: COMMERCIAL

## 2021-12-03 DIAGNOSIS — E11.8 TYPE 2 DIABETES MELLITUS WITH COMPLICATION, WITHOUT LONG-TERM CURRENT USE OF INSULIN (H): Primary | ICD-10-CM

## 2021-12-03 PROCEDURE — G0108 DIAB MANAGE TRN  PER INDIV: HCPCS

## 2021-12-03 NOTE — LETTER
"    12/3/2021         RE: Norma Arcos  [address]  [facility] [address]        Dear Colleague,    Thank you for referring your patient, Norma Arcos, to the [facility]. Please see a copy of my visit note below.      PHONE Diabetes Self-Management Education & Support    Presents for: Individual review with assist of Ewe interpreters [id number] [id number], got cut off twice during call which interrupted the conversation    Type of Visit: Telephone Visit    ASSESSMENT:  Called Norma with [employer]  for comprehensive review.   His last A1C in March was 10.1%.   Reports numbers today that are not consistent with that data, but it is out of date.  Norma says that when he runs out of medication his numbers go over 200. Does not say how often this happens.     Some difficulty determining meal times and when medication is taken- he works overnight. Meals and meds depend on when he gets a work break, how tired he is during the day (or not).    Patient's most recent   Lab Results   Component Value Date    A1C 10.1 03/04/2021    is not meeting goal of <7.0    Diabetes knowledge and skills assessment:   Patient is knowledgeable in diabetes management concepts related to: can benefit from comprehensive review    Continue education with the following diabetes management concepts: Healthy Eating, Being Active, Monitoring, Taking Medication, Problem Solving, Reducing Risks and Healthy Coping    Based on learning assessment above, most appropriate setting for further diabetes education would be: Individual setting.    INTERVENTIONS:    Education provided today on:  AADE Self-Care Behaviors:  Monitoring: purpose, log and interpret results, individual blood glucose targets and when to check;   ~ requested check later in the day on an empty stomach, currently checks fasting when arrives home from work, he agrees, would only like to check once a day \"afraid of finger pokes\" so will not check FBG for " "now, only later    Taking Medication: when to take medications and how much to take (take 2 Glucotrol XL daily at the same time)    Opportunities for ongoing education and support in diabetes-self management were discussed. Pt verbalized understanding of concepts discussed and recommendations provided today.       Education Materials Provided:  No new materials provided today    PLAN  1) take 2 Glipizide XL tabs each day, take together  2) check blood sugar once daily in the afternoon or evening on an empty stomach    Topics to cover at upcoming visits: Healthy Eating, Monitoring, Taking Medication and Reducing Risks    Follow-up: 12/9  Will follow up with PCP 12/14    See Goals Section for co-developed, patient-stated behavior change goals      SUBJECTIVE / OBJECTIVE:  Presents for: Individual review  Accompanied by: Self, ([id number])  Focus of Visit: Patient Unsure  Diabetes type: Type 2  Date of diagnosis: 2017  Disease course: Stable (until run out of medication or don't exercise or eat too much then unstable)  Other concerns:: Language barrier (Speaks Ewe)  Cultural Influences/Ethnic Background:  [place]       Diabetes Symptoms & Complications  Patient Problem List and Family Medical History reviewed for relevant medical history, current medical status, and diabetes risk factors.    Vitals:  There were no vitals taken for this visit.  Estimated body mass index is 30.16 kg/m  as calculated from the following:    Height as of 11/16/21: 1.613 m (5' 3.5\").    Weight as of 11/16/21: 78.5 kg (173 lb).   Last 3 BP:   BP Readings from Last 3 Encounters:   11/16/21 118/74   03/04/21 121/78   07/01/19 119/79       History   Smoking Status     Never Smoker   Smokeless Tobacco     Never Used       Labs:  Lab Results   Component Value Date    A1C 10.1 03/04/2021     Lab Results   Component Value Date     03/04/2021     Lab Results   Component Value Date    LDL 59 03/04/2021     HDL Cholesterol   Date " "Value Ref Range Status   03/04/2021 48 >39 mg/dL Final   ]  GFR Estimate   Date Value Ref Range Status   03/04/2021 >90 >60 mL/min/[1.73_m2] Final     Comment:     Non  GFR Calc  Starting 12/18/2018, serum creatinine based estimated GFR (eGFR) will be   calculated using the Chronic Kidney Disease Epidemiology Collaboration   (CKD-EPI) equation.       GFR Estimate If Black   Date Value Ref Range Status   03/04/2021 >90 >60 mL/min/[1.73_m2] Final     Comment:      GFR Calc  Starting 12/18/2018, serum creatinine based estimated GFR (eGFR) will be   calculated using the Chronic Kidney Disease Epidemiology Collaboration   (CKD-EPI) equation.       Lab Results   Component Value Date    CR 0.91 03/04/2021     No results found for: MICROALBUMIN    Healthy Eating:  Healthy Eating Assessed Today: Yes (he identifies that if he overeats or eats fried/greasy food tht night before, his morning number is higher)  Meals include: Other (works overnight, eats about 4PM before leaving, has a break at 10 PM)  \"No specific schedule\"  Breakfast: no specific time, but maybe about 8-9 AM  Lunch: eats at 4 PM  Dinner: 10 PM or 11 PM at work    Being Active:  Being Active Assessed Today: Yes  Exercise:: Yes (he considers his work part of his exercise and also exercise 2x/week- stationary bike or cardio)  Days per week of moderate to strenuous exercise (like a brisk walk): 2  On average, minutes per day of exercise at this level: 50 (says he does 10 minutes then stops, does this 5 times)  Exercise Minutes per Week: 100  Barrier to exercise: None    Monitoring:  Monitoring Assessed Today: Yes (has been doing but running out of stips)  Did patient bring glucose meter to appointment? : Yes (he wrote them)  Times checking blood sugar at home (number): 1 (strips are ordered for 2x/day)  Times checking blood sugar at home (per): Day (fasting)    Glucose data:  Date Breakfast    Before   28 99   26 129   25 97   23 " 134   22 139   20 160   18 141   11/16 133         Taking Medications:  Diabetes Medication(s)     Biguanides       metFORMIN (GLUCOPHAGE-XR) 500 MG 24 hr tablet    Take 2 tablets (1,000 mg) by mouth 2 times daily with meals  -- For further refills patient needs appointment    Sulfonylureas       glipiZIDE (GLUCOTROL XL) 2.5 MG 24 hr tablet    Take 1 tablet (2.5 mg) by mouth 2 times daily        Has self-decreased meds to just 2 Metformin/day and 1 Glucotrol due to stomach upset.     Taking Medication Assessed Today:   Yes (can't fully understand timing, sometimes at 10 PM at work, somettimes at 4 PM- says he can't take at certain times, twice/day, or feels nauseous)  Current Treatments: Oral Medication (taken by mouth)   ~ Reviewed the Metformin XR may be causing the stomach upset, but not the Glipizide XL so please take 2 Glip/day    Problem Solving:  Problem Solving Assessed Today: No    Reducing Risks:  Reducing Risks Assessed Today: No    Healthy Coping:  Healthy Coping Assessed Today: No  Stage of change: MAINTENANCE (Working to maintain change, with risk of relapse)  Patient Activation Measure Survey Score:  No flowsheet data found.    Karina Diaz RD, LD, [facility]  Time Spent: 70 minutes  Encounter Type: Individual    Any diabetes medication dose changes were made via the Certified Diabetes Care & Education Protocol in collaboration with the patient's referring provider. A copy of this encounter was shared with the provider.

## 2021-12-03 NOTE — Clinical Note
Just a note, we are working on diabetes self-care, off to a good start!  Karina Diaz RD, LD, [facility]

## 2021-12-03 NOTE — PATIENT INSTRUCTIONS
1) take 2 Glucotrol XL every day together (ok to take with the Metformin and other pills if he wants, with food)  2) check 2x/day for next week, fasting and in the afternoon/evening on an empty stomach

## 2021-12-03 NOTE — PROGRESS NOTES
"  PHONE Diabetes Self-Management Education & Support    Presents for: Individual review with assist of Ewe interpreters [id number] [id number], got cut off twice during call which interrupted the conversation    Type of Visit: Telephone Visit    ASSESSMENT:  Called Norma with [employer]  for comprehensive review.   His last A1C in March was 10.1%.   Reports numbers today that are not consistent with that data, but it is out of date.  Norma says that when he runs out of medication his numbers go over 200. Does not say how often this happens.     Some difficulty determining meal times and when medication is taken- he works overnight. Meals and meds depend on when he gets a work break, how tired he is during the day (or not).    Patient's most recent   Lab Results   Component Value Date    A1C 10.1 03/04/2021    is not meeting goal of <7.0    Diabetes knowledge and skills assessment:   Patient is knowledgeable in diabetes management concepts related to: can benefit from comprehensive review    Continue education with the following diabetes management concepts: Healthy Eating, Being Active, Monitoring, Taking Medication, Problem Solving, Reducing Risks and Healthy Coping    Based on learning assessment above, most appropriate setting for further diabetes education would be: Individual setting.    INTERVENTIONS:    Education provided today on:  AADE Self-Care Behaviors:  Monitoring: purpose, log and interpret results, individual blood glucose targets and when to check;   ~ requested check later in the day on an empty stomach, currently checks fasting when arrives home from work, he agrees, would only like to check once a day \"afraid of finger pokes\" so will not check FBG for now, only later    Taking Medication: when to take medications and how much to take (take 2 Glucotrol XL daily at the same time)    Opportunities for ongoing education and support in diabetes-self management were discussed. Pt verbalized " "understanding of concepts discussed and recommendations provided today.       Education Materials Provided:  No new materials provided today    PLAN  1) take 2 Glipizide XL tabs each day, take together  2) check blood sugar once daily in the afternoon or evening on an empty stomach    Topics to cover at upcoming visits: Healthy Eating, Monitoring, Taking Medication and Reducing Risks    Follow-up: 12/9  Will follow up with PCP 12/14    See Goals Section for co-developed, patient-stated behavior change goals      SUBJECTIVE / OBJECTIVE:  Presents for: Individual review  Accompanied by: Self, ([id number])  Focus of Visit: Patient Unsure  Diabetes type: Type 2  Date of diagnosis: 2017  Disease course: Stable (until run out of medication or don't exercise or eat too much then unstable)  Other concerns:: Language barrier (Speaks Ewe)  Cultural Influences/Ethnic Background:  [place]       Diabetes Symptoms & Complications  Patient Problem List and Family Medical History reviewed for relevant medical history, current medical status, and diabetes risk factors.    Vitals:  There were no vitals taken for this visit.  Estimated body mass index is 30.16 kg/m  as calculated from the following:    Height as of 11/16/21: 1.613 m (5' 3.5\").    Weight as of 11/16/21: 78.5 kg (173 lb).   Last 3 BP:   BP Readings from Last 3 Encounters:   11/16/21 118/74   03/04/21 121/78   07/01/19 119/79       History   Smoking Status     Never Smoker   Smokeless Tobacco     Never Used       Labs:  Lab Results   Component Value Date    A1C 10.1 03/04/2021     Lab Results   Component Value Date     03/04/2021     Lab Results   Component Value Date    LDL 59 03/04/2021     HDL Cholesterol   Date Value Ref Range Status   03/04/2021 48 >39 mg/dL Final   ]  GFR Estimate   Date Value Ref Range Status   03/04/2021 >90 >60 mL/min/[1.73_m2] Final     Comment:     Non  GFR Calc  Starting 12/18/2018, serum creatinine based " "estimated GFR (eGFR) will be   calculated using the Chronic Kidney Disease Epidemiology Collaboration   (CKD-EPI) equation.       GFR Estimate If Black   Date Value Ref Range Status   03/04/2021 >90 >60 mL/min/[1.73_m2] Final     Comment:      GFR Calc  Starting 12/18/2018, serum creatinine based estimated GFR (eGFR) will be   calculated using the Chronic Kidney Disease Epidemiology Collaboration   (CKD-EPI) equation.       Lab Results   Component Value Date    CR 0.91 03/04/2021     No results found for: MICROALBUMIN    Healthy Eating:  Healthy Eating Assessed Today: Yes (he identifies that if he overeats or eats fried/greasy food tht night before, his morning number is higher)  Meals include: Other (works overnight, eats about 4PM before leaving, has a break at 10 PM)  \"No specific schedule\"  Breakfast: no specific time, but maybe about 8-9 AM  Lunch: eats at 4 PM  Dinner: 10 PM or 11 PM at work    Being Active:  Being Active Assessed Today: Yes  Exercise:: Yes (he considers his work part of his exercise and also exercise 2x/week- stationary bike or cardio)  Days per week of moderate to strenuous exercise (like a brisk walk): 2  On average, minutes per day of exercise at this level: 50 (says he does 10 minutes then stops, does this 5 times)  Exercise Minutes per Week: 100  Barrier to exercise: None    Monitoring:  Monitoring Assessed Today: Yes (has been doing but running out of stips)  Did patient bring glucose meter to appointment? : Yes (he wrote them)  Times checking blood sugar at home (number): 1 (strips are ordered for 2x/day)  Times checking blood sugar at home (per): Day (fasting)    Glucose data:  Date Breakfast    Before   28 99   26 129   25 97   23 134   22 139   20 160   18 141   11/16 133         Taking Medications:  Diabetes Medication(s)     Biguanides       metFORMIN (GLUCOPHAGE-XR) 500 MG 24 hr tablet    Take 2 tablets (1,000 mg) by mouth 2 times daily with meals  -- For further " refills patient needs appointment    Sulfonylureas       glipiZIDE (GLUCOTROL XL) 2.5 MG 24 hr tablet    Take 1 tablet (2.5 mg) by mouth 2 times daily        Has self-decreased meds to just 2 Metformin/day and 1 Glucotrol due to stomach upset.     Taking Medication Assessed Today:   Yes (can't fully understand timing, sometimes at 10 PM at work, somettimes at 4 PM- says he can't take at certain times, twice/day, or feels nauseous)  Current Treatments: Oral Medication (taken by mouth)   ~ Reviewed the Metformin XR may be causing the stomach upset, but not the Glipizide XL so please take 2 Glip/day    Problem Solving:  Problem Solving Assessed Today: No    Reducing Risks:  Reducing Risks Assessed Today: No    Healthy Coping:  Healthy Coping Assessed Today: No  Stage of change: MAINTENANCE (Working to maintain change, with risk of relapse)  Patient Activation Measure Survey Score:  No flowsheet data found.    Karina Diaz RD, JENNIFER, [facility]  Time Spent: 70 minutes  Encounter Type: Individual    Any diabetes medication dose changes were made via the Certified Diabetes Care & Education Protocol in collaboration with the patient's referring provider. A copy of this encounter was shared with the provider.

## 2021-12-09 ENCOUNTER — VIRTUAL VISIT (OUTPATIENT)
Dept: EDUCATION SERVICES | Facility: CLINIC | Age: 51
End: 2021-12-09
Payer: COMMERCIAL

## 2021-12-09 DIAGNOSIS — E11.8 TYPE 2 DIABETES MELLITUS WITH COMPLICATION, WITHOUT LONG-TERM CURRENT USE OF INSULIN (H): Primary | ICD-10-CM

## 2021-12-09 PROCEDURE — G0108 DIAB MANAGE TRN  PER INDIV: HCPCS | Performed by: DIETITIAN, REGISTERED

## 2021-12-09 NOTE — LETTER
12/9/2021         RE: Norma Arcos  [address]  [facility] [address]        Dear Colleague,    Thank you for referring your patient, Norma Arcos, to the [facility]. Please see a copy of my visit note below.    Diabetes Self-Management Education & Support    Presents for: Follow-up    Type of Visit: Telephone Visit with [employer]we  #[id number]    How would patient like to obtain AVS? MyChart    ASSESSMENT:  Norma is doing well with diabetes self cares. Taking medication, checking BG, working on smaller food portions.  Numbers are nearly all in target, fasting is a bit elevated compared to later in the day, but not consistently, about 50% of fasting readings in goal and elevated into the 130's and 140's primarily.    Patient's most recent   Lab Results   Component Value Date    A1C 10.1 03/04/2021    is not meeting goal of <7.0    He is scheduled for labs 12/14    Diabetes knowledge and skills assessment:   Patient is knowledgeable in diabetes management concepts related to: Healthy Eating, Being Active, Monitoring, Taking Medication, Problem Solving, Reducing Risks and Healthy Coping    Continue education with the following diabetes management concepts: Healthy Eating, Monitoring and Reducing Risks    Based on learning assessment above, most appropriate setting for further diabetes education would be: Individual setting.    INTERVENTIONS:    Education provided today on:  AADE Self-Care Behaviors:  Healthy Eating: portion control and reviewed certain foods he had questions about- rice, beans, are they healthy, how much is a portion    Monitoring: purpose, individual blood glucose targets and monitor A1C    Reducing Risks: A1C - goals, relating to blood glucose levels, how often to check    Opportunities for ongoing education and support in diabetes-self management were discussed. Pt verbalized understanding of concepts discussed and recommendations provided today.    "    Education Materials Provided:  No new materials provided today      PLAN    Karina Diaz RD, LD, [facility]    Topics to cover at upcoming visits: annual review  Follow-up: in 1 year, sooner if needs arise, diabetes triage # given    See Goals Section for co-developed, patient-stated behavior change goals.  AVS provided to patient today.          SUBJECTIVE / OBJECTIVE:  Presents for: Follow-up  Accompanied by: Self, ([id number])  Focus of Visit: Patient Unsure  Diabetes type: Type 2  Date of diagnosis: 2017  Disease course: Stable (until run out of medication or don't exercise or eat too much then unstable)  Other concerns:: Language barrier (Speaks Ewe)  Cultural Influences/Ethnic Background:  [place]       Diabetes Symptoms & Complications:    Patient Problem List and Family Medical History reviewed for relevant medical history, current medical status, and diabetes risk factors.    Vitals:  There were no vitals taken for this visit.  Estimated body mass index is 30.16 kg/m  as calculated from the following:    Height as of 11/16/21: 1.613 m (5' 3.5\").    Weight as of 11/16/21: 78.5 kg (173 lb).   Last 3 BP:   BP Readings from Last 3 Encounters:   11/16/21 118/74   03/04/21 121/78   07/01/19 119/79       History   Smoking Status     Never Smoker   Smokeless Tobacco     Never Used       Labs:  Lab Results   Component Value Date    A1C 10.1 03/04/2021     Lab Results   Component Value Date     03/04/2021     Lab Results   Component Value Date    LDL 59 03/04/2021     HDL Cholesterol   Date Value Ref Range Status   03/04/2021 48 >39 mg/dL Final   ]  GFR Estimate   Date Value Ref Range Status   03/04/2021 >90 >60 mL/min/[1.73_m2] Final     Comment:     Non  GFR Calc  Starting 12/18/2018, serum creatinine based estimated GFR (eGFR) will be   calculated using the Chronic Kidney Disease Epidemiology Collaboration   (CKD-EPI) equation.       GFR Estimate If Black   Date Value Ref Range " Status   2021 >90 >60 mL/min/[1.73_m2] Final     Comment:      GFR Calc  Starting 2018, serum creatinine based estimated GFR (eGFR) will be   calculated using the Chronic Kidney Disease Epidemiology Collaboration   (CKD-EPI) equation.       Lab Results   Component Value Date    CR 0.91 2021     No results found for: MICROALBUMIN    Healthy Eating:  Healthy Eating Assessed Today: Yes (he identifies that if he overeats or eats fried/greasy food tht night before, his morning number is higher)  Meal planning/habits: Smaller portions,Other (trying to eat less rice)  Meals include: Other (works overnight, eats about 4PM before leaving, has a break at 10 PM)  Breakfast: no specific time, but maybe about 8-9 AM  Lunch: eats at 4 PM before work- rice, fufu  Dinner: 10 PM or 11 PM at work- black eyed peas, rice  Other: asks about alternative to rice so he doesn't get hungry, suggested mix in cauliflower rice  Has patient met with a dietitian in the past?: Yes (our first visit)    Being Active:  Being Active Assessed Today: Yes  Exercise:: Yes (he considers his work part of his exercise and also exercise 2x/week- stationary bike or cardio)  Days per week of moderate to strenuous exercise (like a brisk walk): 2  On average, minutes per day of exercise at this level: 50 (says he does 10 minutes then stops, does this 5 times)  Exercise Minutes per Week: 100  Barrier to exercise: None    Monitoring:  Monitoring Assessed Today: Yes (has been doing but running out of stips)  Did patient bring glucose meter to appointment? : Yes (he wrote them)  Times checking blood sugar at home (number): 2 (strips are ordered for 2x/day)  Times checking blood sugar at home (per): Day   Glucose data:  fasting 148, 101  ~ asked at last visit to check some later in day after eatin, 121,  112, 134 (after rice and fufu), 118, 118    Taking Medications:  Diabetes Medication(s)     Biguanides       metFORMIN  (GLUCOPHAGE-XR) 500 MG 24 hr tablet    Take 2 tablets (1,000 mg) by mouth 2 times daily with meals  -- For further refills patient needs appointment    Sulfonylureas       glipiZIDE (GLUCOTROL XL) 2.5 MG 24 hr tablet    Take 1 tablet (2.5 mg) by mouth 2 times daily        He has been taking the Gluctrol XL 2.5 x 2 tabs together as recommended,   Taking only 2 Metformin tabs daily due to GI upset.      Taking Medication Assessed Today: Yes (can't fully understand timing, sometimes at 10 PM at work, somettimes at 4 PM- says he can't take at certain times, twice/day, or feels nauseous)  Current Treatments: Oral Medication (taken by mouth) (taking 2 Metformin and now 2 Glipizide XL daily together (due to nausea, self decreased metformin)  Problems taking diabetes medications regularly?: Yes (If gets home and  has breakfast in AM  takes pills then, if does not eat breakfast he takes the pils in the afternoon before work) - should not be an issue since the medications are both extended release.    Problem Solving:  Problem Solving Assessed Today: No  Is the patient at risk for hypoglycemia?: Yes  Hypoglycemia Frequency: Never  Is the patient at risk for DKA?: No      Reducing Risks:  Reducing Risks Assessed Today: Yes  Diabetes Risks: Age over 45 years,Ethnicity  CAD Risks: Diabetes Mellitus,Male sex,Obesity    Healthy Coping:  Healthy Coping Assessed Today: Yes  Emotional response to diabetes: Ready to learn,Confidence diabetes can be controlled  Stage of change: MAINTENANCE (Working to maintain change, with risk of relapse)  Patient Activation Measure Survey Score:  No flowsheet data found.      Karina Diaz RD, LD, [facility]    Time Spent: 40 minutes  Encounter Type: Individual        Any diabetes medication dose changes were made via the Certified Diabetes Care & Education Protocol in collaboration with the patient's referring provider. A copy of this encounter was shared with the provider.

## 2021-12-09 NOTE — Clinical Note
Foli is doing well with diabetes self cares: BG checks, medication, smaller portions. He has labs scheduled on 12/14- expect A1C will be near target as BG's have been at goal.   Thank you! Karina Diaz RD, LD, [facility]

## 2021-12-09 NOTE — PROGRESS NOTES
Diabetes Self-Management Education & Support    Presents for: Follow-up    Type of Visit: Telephone Visit with Mauricio  #[id number]    How would patient like to obtain AVS? Woody[place]    ASSESSMENT:  Foli is doing well with diabetes self cares. Taking medication, checking BG, working on smaller food portions.  Numbers are nearly all in target, fasting is a bit elevated compared to later in the day, but not consistently, about 50% of fasting readings in goal and elevated into the 130's and 140's primarily.    Patient's most recent   Lab Results   Component Value Date    A1C 10.1 03/04/2021    is not meeting goal of <7.0    He is scheduled for labs 12/14    Diabetes knowledge and skills assessment:   Patient is knowledgeable in diabetes management concepts related to: Healthy Eating, Being Active, Monitoring, Taking Medication, Problem Solving, Reducing Risks and Healthy Coping    Continue education with the following diabetes management concepts: Healthy Eating, Monitoring and Reducing Risks    Based on learning assessment above, most appropriate setting for further diabetes education would be: Individual setting.    INTERVENTIONS:    Education provided today on:  VALERIEE Self-Care Behaviors:  Healthy Eating: portion control and reviewed certain foods he had questions about- rice, beans, are they healthy, how much is a portion    Monitoring: purpose, individual blood glucose targets and monitor A1C    Reducing Risks: A1C - goals, relating to blood glucose levels, how often to check    Opportunities for ongoing education and support in diabetes-self management were discussed. Pt verbalized understanding of concepts discussed and recommendations provided today.       Education Materials Provided:  No new materials provided today      PLAN    Karina Diaz RD, LD, [facility]    Topics to cover at upcoming visits: annual review  Follow-up: in 1 year, sooner if needs arise, diabetes triage # given    See Goals Section for  "co-developed, patient-stated behavior change goals.  AVS provided to patient today.          SUBJECTIVE / OBJECTIVE:  Presents for: Follow-up  Accompanied by: Self, ([id number])  Focus of Visit: Patient Unsure  Diabetes type: Type 2  Date of diagnosis: 2017  Disease course: Stable (until run out of medication or don't exercise or eat too much then unstable)  Other concerns:: Language barrier (Speaks Ewe)  Cultural Influences/Ethnic Background:  [place]       Diabetes Symptoms & Complications:    Patient Problem List and Family Medical History reviewed for relevant medical history, current medical status, and diabetes risk factors.    Vitals:  There were no vitals taken for this visit.  Estimated body mass index is 30.16 kg/m  as calculated from the following:    Height as of 11/16/21: 1.613 m (5' 3.5\").    Weight as of 11/16/21: 78.5 kg (173 lb).   Last 3 BP:   BP Readings from Last 3 Encounters:   11/16/21 118/74   03/04/21 121/78   07/01/19 119/79       History   Smoking Status     Never Smoker   Smokeless Tobacco     Never Used       Labs:  Lab Results   Component Value Date    A1C 10.1 03/04/2021     Lab Results   Component Value Date     03/04/2021     Lab Results   Component Value Date    LDL 59 03/04/2021     HDL Cholesterol   Date Value Ref Range Status   03/04/2021 48 >39 mg/dL Final   ]  GFR Estimate   Date Value Ref Range Status   03/04/2021 >90 >60 mL/min/[1.73_m2] Final     Comment:     Non  GFR Calc  Starting 12/18/2018, serum creatinine based estimated GFR (eGFR) will be   calculated using the Chronic Kidney Disease Epidemiology Collaboration   (CKD-EPI) equation.       GFR Estimate If Black   Date Value Ref Range Status   03/04/2021 >90 >60 mL/min/[1.73_m2] Final     Comment:      GFR Calc  Starting 12/18/2018, serum creatinine based estimated GFR (eGFR) will be   calculated using the Chronic Kidney Disease Epidemiology Collaboration   (CKD-EPI) " equation.       Lab Results   Component Value Date    CR 0.91 2021     No results found for: MICROALBUMIN    Healthy Eating:  Healthy Eating Assessed Today: Yes (he identifies that if he overeats or eats fried/greasy food tht night before, his morning number is higher)  Meal planning/habits: Smaller portions,Other (trying to eat less rice)  Meals include: Other (works overnight, eats about 4PM before leaving, has a break at 10 PM)  Breakfast: no specific time, but maybe about 8-9 AM  Lunch: eats at 4 PM before work- rice, fufu  Dinner: 10 PM or 11 PM at work- black eyed peas, rice  Other: asks about alternative to rice so he doesn't get hungry, suggested mix in cauliflower rice  Has patient met with a dietitian in the past?: Yes (our first visit)    Being Active:  Being Active Assessed Today: Yes  Exercise:: Yes (he considers his work part of his exercise and also exercise 2x/week- stationary bike or cardio)  Days per week of moderate to strenuous exercise (like a brisk walk): 2  On average, minutes per day of exercise at this level: 50 (says he does 10 minutes then stops, does this 5 times)  Exercise Minutes per Week: 100  Barrier to exercise: None    Monitoring:  Monitoring Assessed Today: Yes (has been doing but running out of stips)  Did patient bring glucose meter to appointment? : Yes (he wrote them)  Times checking blood sugar at home (number): 2 (strips are ordered for 2x/day)  Times checking blood sugar at home (per): Day   Glucose data:  fasting 148, 101  ~ asked at last visit to check some later in day after eatin, 121,  112, 134 (after rice and fufu), 118, 118    Taking Medications:  Diabetes Medication(s)     Biguanides       metFORMIN (GLUCOPHAGE-XR) 500 MG 24 hr tablet    Take 2 tablets (1,000 mg) by mouth 2 times daily with meals  -- For further refills patient needs appointment    Sulfonylureas       glipiZIDE (GLUCOTROL XL) 2.5 MG 24 hr tablet    Take 1 tablet (2.5 mg) by mouth 2 times  daily        He has been taking the Gluctrol XL 2.5 x 2 tabs together as recommended,   Taking only 2 Metformin tabs daily due to GI upset.      Taking Medication Assessed Today: Yes (can't fully understand timing, sometimes at 10 PM at work, somettimes at 4 PM- says he can't take at certain times, twice/day, or feels nauseous)  Current Treatments: Oral Medication (taken by mouth) (taking 2 Metformin and now 2 Glipizide XL daily together (due to nausea, self decreased metformin)  Problems taking diabetes medications regularly?: Yes (If gets home and  has breakfast in AM  takes pills then, if does not eat breakfast he takes the pils in the afternoon before work) - should not be an issue since the medications are both extended release.    Problem Solving:  Problem Solving Assessed Today: No  Is the patient at risk for hypoglycemia?: Yes  Hypoglycemia Frequency: Never  Is the patient at risk for DKA?: No      Reducing Risks:  Reducing Risks Assessed Today: Yes  Diabetes Risks: Age over 45 years,Ethnicity  CAD Risks: Diabetes Mellitus,Male sex,Obesity    Healthy Coping:  Healthy Coping Assessed Today: Yes  Emotional response to diabetes: Ready to learn,Confidence diabetes can be controlled  Stage of change: MAINTENANCE (Working to maintain change, with risk of relapse)  Patient Activation Measure Survey Score:  No flowsheet data found.      Karina Diaz RD, LD, [facility]    Time Spent: 40 minutes  Encounter Type: Individual        Any diabetes medication dose changes were made via the Certified Diabetes Care & Education Protocol in collaboration with the patient's referring provider. A copy of this encounter was shared with the provider.

## 2021-12-13 NOTE — PROGRESS NOTES
Pre-Visit Planning   Next 5 appointments (look out 90 days)    Dec 14, 2021 10:20 AM  (Arrive by 10:00 AM)  Provider Visit with Taylor Fortune MD, VIDEO,   [facility] ([facility] ) [address] [address] [address] [phone]        Appointment Notes for this encounter:      Diabetic follow up  Please use phone or ipad to reach  [phone] option 0    Questionnaires Reviewed/Assigned  No additional questionnaires are needed    Patient preferred phone number: [phone]      Contacted patient via phone/Krillionhart. Are there any additional questions or concerns you'd like to review with your provider during your visit?     Visit is not preventive.    Meds  Home Meds reviewed and updated    Entered patient-preferred pharmacy.      [id number] [place], [place] - [address] [address]      Current Outpatient Medications   Medication     alcohol swab prep pads     ASPIRIN LOW DOSE 81 MG EC tablet     atorvastatin (LIPITOR) 40 MG tablet     blood glucose (NO BRAND SPECIFIED) test strip     blood glucose calibration (CONTOUR NEXT CONTROL LOW VI SOLN) Low solution     blood glucose calibration (NO BRAND SPECIFIED) solution     blood glucose monitoring (CONTOUR NEXT MONITOR W/DEVICE KIT) meter device kit     blood glucose monitoring (NO BRAND SPECIFIED) meter device kit     blood glucose monitoring (NO BRAND SPECIFIED) meter device kit     CONTOUR NEXT TEST test strip     glipiZIDE (GLUCOTROL XL) 2.5 MG 24 hr tablet     losartan (COZAAR) 25 MG tablet     metFORMIN (GLUCOPHAGE-XR) 500 MG 24 hr tablet     Microlet Lancets MISC     thin (NO BRAND SPECIFIED) lancets     No current facility-administered medications for this visit.       Health Maintenance   Health Maintenance Due   Topic Date Due     ANNUAL REVIEW OF HM ORDERS  Never done     Pneumococcal Vaccine: Pediatrics (0 to 5 Years) and At-Risk Patients  (6 to 64 Years) (1 of 2 - PPSV23) Never done     HIV SCREENING  Never done     HEPATITIS C SCREENING  Never done     HEPATITIS B IMMUNIZATION (1 of 3 - Risk 3-dose series) Never done     DTAP/TDAP/TD IMMUNIZATION (1 - Tdap) Never done     EYE EXAM  03/20/2019     INFLUENZA VACCINE (1) 09/01/2021     A1C  09/04/2021       Health Maintenance reviewed and Health Maintenance orders pended    MyChart  Patient is not active on [employer]. Encouraged mSchoolhart activation. Declined at this time due to language barrier.     Call Summary  Spoke to patient via phone  [phone]. PVP completed.

## 2021-12-14 ENCOUNTER — OFFICE VISIT (OUTPATIENT)
Dept: FAMILY MEDICINE | Facility: CLINIC | Age: 51
End: 2021-12-14
Payer: COMMERCIAL

## 2021-12-14 ENCOUNTER — LAB (OUTPATIENT)
Dept: LAB | Facility: CLINIC | Age: 51
End: 2021-12-14
Payer: COMMERCIAL

## 2021-12-14 VITALS
SYSTOLIC BLOOD PRESSURE: 116 MMHG | HEART RATE: 77 BPM | DIASTOLIC BLOOD PRESSURE: 84 MMHG | HEIGHT: 65 IN | BODY MASS INDEX: 29.61 KG/M2 | OXYGEN SATURATION: 99 % | RESPIRATION RATE: 18 BRPM | TEMPERATURE: 98.9 F | WEIGHT: 177.7 LBS

## 2021-12-14 DIAGNOSIS — E11.8 TYPE 2 DIABETES MELLITUS WITH COMPLICATION, WITHOUT LONG-TERM CURRENT USE OF INSULIN (H): ICD-10-CM

## 2021-12-14 DIAGNOSIS — E11.65 TYPE 2 DIABETES MELLITUS WITH HYPERGLYCEMIA, WITHOUT LONG-TERM CURRENT USE OF INSULIN (H): Primary | ICD-10-CM

## 2021-12-14 LAB
ANION GAP SERPL CALCULATED.3IONS-SCNC: 7 MMOL/L (ref 3–14)
BUN SERPL-MCNC: 9 MG/DL (ref 7–30)
CALCIUM SERPL-MCNC: 9.1 MG/DL (ref 8.5–10.1)
CHLORIDE BLD-SCNC: 104 MMOL/L (ref 94–109)
CO2 SERPL-SCNC: 25 MMOL/L (ref 20–32)
CREAT SERPL-MCNC: 0.87 MG/DL (ref 0.66–1.25)
CREAT UR-MCNC: 33 MG/DL
GFR SERPL CREATININE-BSD FRML MDRD: >90 ML/MIN/1.73M2
GLUCOSE BLD-MCNC: 103 MG/DL (ref 70–99)
HBA1C MFR BLD: 9.7 % (ref 0–5.6)
MICROALBUMIN UR-MCNC: 10 MG/L
MICROALBUMIN/CREAT UR: 30.3 MG/G CR (ref 0–17)
POTASSIUM BLD-SCNC: 3.6 MMOL/L (ref 3.4–5.3)
SODIUM SERPL-SCNC: 136 MMOL/L (ref 133–144)

## 2021-12-14 PROCEDURE — 99213 OFFICE O/P EST LOW 20 MIN: CPT | Performed by: FAMILY MEDICINE

## 2021-12-14 PROCEDURE — 80048 BASIC METABOLIC PNL TOTAL CA: CPT

## 2021-12-14 PROCEDURE — 82043 UR ALBUMIN QUANTITATIVE: CPT

## 2021-12-14 PROCEDURE — 83036 HEMOGLOBIN GLYCOSYLATED A1C: CPT

## 2021-12-14 PROCEDURE — 36415 COLL VENOUS BLD VENIPUNCTURE: CPT

## 2021-12-14 PROCEDURE — 80061 LIPID PANEL: CPT

## 2021-12-14 ASSESSMENT — ENCOUNTER SYMPTOMS
BACK PAIN: 0
FATIGUE: 0
SHORTNESS OF BREATH: 0
COUGH: 0

## 2021-12-14 ASSESSMENT — MIFFLIN-ST. JEOR: SCORE: 1593.95

## 2021-12-14 NOTE — PROGRESS NOTES
"  Assessment & Plan     Type 2 diabetes mellitus with hyperglycemia, without long-term current use of insulin (H)  - Discussed diet modification   - Discussed continuing current medication   -blood sugars reviewed now improved .  - Hemoglobin A1c; Future  - fasting glucose now in range .    Will repeat A1C in 3 months .          Return in about 3 months (around 3/14/2022) for Follow up.    Taylor Fortune MD  [facility] JAKE Green is a 51 year old who presents for the following health issues     History of Present Illness       Diabetes:   He presents for follow up of diabetes.  He is checking home blood glucose one time daily. He checks blood glucose after meals.  Blood glucose is sometimes over 200 and never under 70. When his blood glucose is low, the patient is asymptomatic for confusion, blurred vision, lethargy and reports not feeling dizzy, shaky, or weak.  He is concerned about other.  He is not experiencing numbness or burning in feet, excessive thirst, blurry vision, weight changes or redness, sores or blisters on feet. The patient has not had a diabetic eye exam in the last 12 months.         He eats 0-1 servings of fruits and vegetables daily.He consumes 1 sweetened beverage(s) daily.He exercises with enough effort to increase his heart rate 10 to 19 minutes per day.  He exercises with enough effort to increase his heart rate 4 days per week.   He is taking medications regularly.           Review of Systems   Constitutional: Negative for fatigue.   Respiratory: Negative for cough and shortness of breath.    Musculoskeletal: Negative for back pain.   Psychiatric/Behavioral: Negative for behavioral problems.            Objective    /84   Pulse 77   Temp 98.9  F (37.2  C)   Resp 18   Ht 1.645 m (5' 4.75\")   Wt 80.6 kg (177 lb 11.2 oz)   SpO2 99%   BMI 29.80 kg/m    Body mass index is 29.8 kg/m .  Physical Exam  Vitals and nursing note reviewed.   Cardiovascular: "      Rate and Rhythm: Normal rate and regular rhythm.      Pulses: Normal pulses.   Pulmonary:      Effort: Pulmonary effort is normal.      Breath sounds: Normal breath sounds.   Abdominal:      General: Abdomen is flat.      Palpations: Abdomen is soft.

## 2021-12-17 LAB
CHOLEST SERPL-MCNC: 108 MG/DL
FASTING STATUS PATIENT QL REPORTED: YES
HDLC SERPL-MCNC: 43 MG/DL
LDLC SERPL CALC-MCNC: 51 MG/DL
NONHDLC SERPL-MCNC: 65 MG/DL
TRIGL SERPL-MCNC: 72 MG/DL

## 2022-01-10 ENCOUNTER — TELEPHONE (OUTPATIENT)
Dept: FAMILY MEDICINE | Facility: CLINIC | Age: 52
End: 2022-01-10
Payer: COMMERCIAL

## 2022-01-10 NOTE — TELEPHONE ENCOUNTER
Foli is calling is asking about [facility] address and appointment details. Information given to him, no further concerns at this time.     Due for follow up with PCP in March, will reach out to patient closer to that date to schedule. Chaparrita Guerrero R.N.

## 2022-01-13 ENCOUNTER — OFFICE VISIT (OUTPATIENT)
Dept: OPTOMETRY | Facility: CLINIC | Age: 52
End: 2022-01-13
Payer: COMMERCIAL

## 2022-01-13 DIAGNOSIS — H52.4 PRESBYOPIA: Primary | ICD-10-CM

## 2022-01-13 DIAGNOSIS — H40.003 GLAUCOMA SUSPECT, BILATERAL: ICD-10-CM

## 2022-01-13 DIAGNOSIS — H40.053 OCULAR HYPERTENSION, BILATERAL: ICD-10-CM

## 2022-01-13 DIAGNOSIS — E11.9 DIABETES MELLITUS WITHOUT OPHTHALMIC MANIFESTATIONS (H): ICD-10-CM

## 2022-01-13 PROCEDURE — 92015 DETERMINE REFRACTIVE STATE: CPT | Performed by: OPTOMETRIST

## 2022-01-13 PROCEDURE — 92004 COMPRE OPH EXAM NEW PT 1/>: CPT | Performed by: OPTOMETRIST

## 2022-01-13 ASSESSMENT — SLIT LAMP EXAM - LIDS
COMMENTS: NORMAL
COMMENTS: NORMAL

## 2022-01-13 ASSESSMENT — REFRACTION_MANIFEST
OD_AXIS: 091
OS_SPHERE: -0.25
OD_AXIS: 092
OD_CYLINDER: +0.75
OS_ADD: +2.00
OD_SPHERE: -0.25
METHOD_AUTOREFRACTION: 1
OS_SPHERE: +0.00
OS_CYLINDER: +0.50
OD_SPHERE: -0.25
OD_ADD: +2.00
OD_CYLINDER: +0.50
OS_CYLINDER: +0.75
OS_AXIS: 015
OS_AXIS: 012

## 2022-01-13 ASSESSMENT — VISUAL ACUITY
METHOD: SNELLEN - LINEAR
OS_SC: 20/200
OD_SC+: -2
OS_SC: 20/25
OD_SC: 20/200
OD_SC: 20/30

## 2022-01-13 ASSESSMENT — EXTERNAL EXAM - RIGHT EYE: OD_EXAM: NORMAL

## 2022-01-13 ASSESSMENT — KERATOMETRY
OS_K1POWER_DIOPTERS: 42
METHOD_AUTO_MANUAL: AUTOMATED
OS_K2POWER_DIOPTERS: 43.25
OS_AXISANGLE_DEGREES: 88
OS_AXISANGLE2_DEGREES: 178

## 2022-01-13 ASSESSMENT — EXTERNAL EXAM - LEFT EYE: OS_EXAM: NORMAL

## 2022-01-13 ASSESSMENT — TONOMETRY
IOP_METHOD: APPLANATION
OS_IOP_MMHG: 28
OD_IOP_MMHG: 26

## 2022-01-13 ASSESSMENT — CONF VISUAL FIELD
OS_NORMAL: 1
OD_NORMAL: 1
METHOD: COUNTING FINGERS

## 2022-01-13 ASSESSMENT — CUP TO DISC RATIO
OD_RATIO: 0.8
OS_RATIO: 0.8

## 2022-01-13 NOTE — LETTER
1/13/2022         RE: Norma Arcos  [address]  [place] [address]        Dear Colleague,    Thank you for referring your patient, Norma Arcos, to the [facility]. Please see a copy of my visit note below.    Chief Complaint   Patient presents with     Diabetic Eye Exam       Chief Complaint(s) and History of Present Illness(es)     Diabetic Eye Exam     Vision: is blurred for near    Diabetes Type: Type 2 and taking oral medications    Duration: 4 years    Blood Sugars: fluctuates               Hemoglobin A1C POCT   Date Value Ref Range Status   03/04/2021 10.1 (H) 0 - 5.6 % Final     Comment:     Results confirmed by repeat test  Normal <5.7% Prediabetes 5.7-6.4%  Diabetes 6.5% or higher - adopted from ADA   consensus guidelines.     08/20/2020 7.7 (H) 0 - 5.6 % Final     Comment:     Normal <5.7% Prediabetes 5.7-6.4%  Diabetes 6.5% or higher - adopted from ADA   consensus guidelines.     06/06/2019 6.5 (H) 0 - 5.6 % Final     Comment:     Normal <5.7% Prediabetes 5.7-6.4%  Diabetes 6.5% or higher - adopted from ADA   consensus guidelines.       Hemoglobin A1C   Date Value Ref Range Status   12/14/2021 9.7 (H) 0.0 - 5.6 % Final     Comment:     Normal <5.7%   Prediabetes 5.7-6.4%    Diabetes 6.5% or higher     Note: Adopted from ADA consensus guidelines.     [id number]       Last Eye Exam: 2018  Dilated Previously: Yes, side effects of dilation explained today    What are you currently using to see?  does not use glasses or contacts    Distance Vision Acuity: Satisfied with vision    Near Vision Acuity: Not satisfied - not currently wearing glasses    Eye Comfort: good  Do you use eye drops? : No  Occupation or Hobbies:      [employer]     Medical, surgical and family histories reviewed and updated 1/13/2022.       OBJECTIVE: See Ophthalmology exam    ASSESSMENT:    ICD-10-CM    1. Presbyopia  H52.4 EYE EXAM (SIMPLE-NONBILLABLE)     REFRACTION   2. Diabetes  mellitus without ophthalmic manifestations (H)  E11.9 EYE EXAM (SIMPLE-NONBILLABLE)     REFRACTION   3. Ocular hypertension, bilateral  H40.053 Adult Eye Referral   4. Glaucoma suspect, bilateral  H40.003 Adult Eye Referral        PLAN:  [facility], patient aware without treatment vision could be lost  Glucose control     Foli A Mesanvi aware  eye exam results will be sent to Taylor Fortune.    Violetta Reyes OD           Again, thank you for allowing me to participate in the care of your patient.        Sincerely,        Violetta Reyes, OD

## 2022-01-13 NOTE — PATIENT INSTRUCTIONS
You have high pressure in your eyes, so I am recommending further glaucoma testing  [facility]  [address]  [place] [address]  [phone]      Patient Education   Diabetes weakens the blood vessels all over the body, including the eyes. Damage to the blood vessels in the eyes can cause swelling or bleeding into part of the eye (called the retina). This is called diabetic retinopathy (ANTONIO-tin-AH-puh-thee). If not treated, this disease can cause vision loss or blindness.   Symptoms may include blurred or distorted vision, but many people have no symptoms. It's important to see your eye doctor regularly to check for problems.   Early treatment and good control can help protect your vision. Here are the things you can do to help prevent vision loss:      1. Keep your blood sugar levels under tight control.      2. Bring high blood pressure under control.      3. No smoking.      4. Have yearly dilated eye exams.    you do not need glasses for distance only reading and can get over the counter +2.00 readers for fine print  Patient Education     Chronic Open-Angle Glaucoma    The eye is a fluid-filled globe with a lens near the front and a light-sensitive screen in the back (retina). The optic nerve conducts light signals from the retina to the brain. It allows you to see images. Eye fluid is constantly made within the eye. Excess fluid drains out into the bloodstream.   Open-angle glaucoma is when the fluid pressure in the eye slowly increases and damages the optic nerve. This causes a gradual loss of vision, over months to years. It may progress to complete blindness if not treated. The cause of glaucoma is not known. It may be inherited.   Open-angle glaucoma is painless. The first symptoms may be loss of side (peripheral) vision. Or you may have no symptoms at all. Many times the symptoms appear later in the disease. So you may have a lot of vision loss before you become  aware of the problem. The vision loss is long-lasting (permanent).   Open-angle glaucoma can be treated with eye drops, pills, surgery, and laser treatment. These help to reduce the pressure in the eye. Treatment is often successful at keeping pressures low and preventing more vision loss. This condition can t be cured, so it is important to get treatment as prescribed for the rest of your life. Regular follow-up care with an eye care provider (an ophthalmologist) is very important to check your response to treatment.   Home care    Take medicines exactly as prescribed.    The eye needs certain vitamins and minerals for good health--especially vitamins A, C, and E, as well as zinc and copper. Eat a healthy diet full of fruits and vegetables to be sure that you get enough of these nutrients. If you have trouble following a balanced diet, think about taking a vitamin and mineral supplement.    Drink 6 to 8 glasses of water in smaller amounts during the course of a day. Drinking too much at one time (more than 1 quart) may increase eye pressure.    Limit the amount of caffeine that you drink to moderate levels.    Regular exercise (3 times a week) may help reduce eye pressure. Stay away from exercise positions that put your head below your waist (such as bending over). This position will increase eye pressure. Talk with your healthcare provider about an exercise program that s right for you.    Protect your eyes. An eye injury can cause increased eye pressure. Wear safety glasses or goggles when you play sports, use tools or machinery, or work with chemicals.    Follow-up care  Follow up with your eye care provider, or as advised. Regular appointments will help make sure that your treatment is keeping your eyes at a safe pressure.   Open-angle glaucoma tends to run in families. Other family members older than age41 should also be examined by an eye care provider.   When to seek medical care  Seek medical care right  away if any of these occur:     Further loss of peripheral vision    Blurred vision    Eye pain or redness    Severe headache    Walker halos around lights    Sudden loss of vision  [employer] last reviewed this educational content on 6/1/2020 2000-2021 [employer]. All rights reserved. This information is not intended as a substitute for professional medical care. Always follow your healthcare professional's instructions.           Patient Education     Treating Glaucoma    Treatment can prevent or limit vision loss from glaucoma. The goal of treatment is to control glaucoma by lowering eye pressure. Your eye healthcare provider can suggest what treatment is best for you. You may just need more frequent exams. Medicines and procedures may also help.  Medicines to lower eye pressure  Eye drops and pills may be used to lower eye pressure. Some medicines reduce the amount of fluid your eyes make. Others increase drainage of fluid from the eyes. Use your medicines every day as directed. Don't stop taking them--even if you have no symptoms. If you do, eye pressure can rise rapidly and damage your vision. If the medicines cause side effects, talk to your eye healthcare provider.  Procedures to improve eye drainage  In some cases of glaucoma, other procedures are used to improve eye drainage:    Lasers can be used to increase drainage.    If other treatments don't work, surgery may be suggested.  [employer] last reviewed this educational content on 10/1/2017    5794-0990 [employer]. All rights reserved. This information is not intended as a substitute for professional medical care. Always follow your healthcare professional's instructions.           Patient Education     What Is Glaucoma?  Glaucoma is an eye disease that can cause blindness. If found early, it can often be controlled. But it often has no symptoms, so you need regular eye exams. Glaucoma often begins when pressure builds up in  the eye. This pressure can damage the optic nerve. The optic nerve sends messages to the brain so you can see. There are 2 main kinds of glaucoma: open-angle and closed-angle.     Drainage area  The eye is always producing fluid. The eye's drainage areas may become clogged or blocked. Too much fluid stays in the eye. This increases eye pressure.   Optic nerve  Too much pressure in the eye can damage the optic nerve. If damaged, this nerve cannot normally send the messages to the brain that let you see.   Open-angle glaucoma  Open-angle glaucoma is the most common kind. It happens slowly as people age. The drainage area in the eye becomes clogged. Not enough fluid drains from the eye, so pressure slowly builds up. This causes a slow loss of side (peripheral) vision. You may not even notice changes until much of your vision is lost.   Closed-angle glaucoma  Closed-angle glaucoma is less common than open-angle. It often comes on quickly. The drainage area in the eye suddenly becomes completely blocked. Eye pressure builds rapidly. You may notice blurred vision and rainbow halos around lights. You may also have headaches, nausea, vomiting, and severe pain. If it is not treated right away, blindness can happen quickly.   [employer] last reviewed this educational content on 8/1/2020 2000-2021 [employer]. All rights reserved. This information is not intended as a substitute for professional medical care. Always follow your healthcare professional's instructions.

## 2022-01-13 NOTE — PROGRESS NOTES
Chief Complaint   Patient presents with     Diabetic Eye Exam       Chief Complaint(s) and History of Present Illness(es)     Diabetic Eye Exam     Vision: is blurred for near    Diabetes Type: Type 2 and taking oral medications    Duration: 4 years    Blood Sugars: fluctuates               Hemoglobin A1C POCT   Date Value Ref Range Status   03/04/2021 10.1 (H) 0 - 5.6 % Final     Comment:     Results confirmed by repeat test  Normal <5.7% Prediabetes 5.7-6.4%  Diabetes 6.5% or higher - adopted from ADA   consensus guidelines.     08/20/2020 7.7 (H) 0 - 5.6 % Final     Comment:     Normal <5.7% Prediabetes 5.7-6.4%  Diabetes 6.5% or higher - adopted from ADA   consensus guidelines.     06/06/2019 6.5 (H) 0 - 5.6 % Final     Comment:     Normal <5.7% Prediabetes 5.7-6.4%  Diabetes 6.5% or higher - adopted from ADA   consensus guidelines.       Hemoglobin A1C   Date Value Ref Range Status   12/14/2021 9.7 (H) 0.0 - 5.6 % Final     Comment:     Normal <5.7%   Prediabetes 5.7-6.4%    Diabetes 6.5% or higher     Note: Adopted from ADA consensus guidelines.     [id number]       Last Eye Exam: 2018  Dilated Previously: Yes, side effects of dilation explained today    What are you currently using to see?  does not use glasses or contacts    Distance Vision Acuity: Satisfied with vision    Near Vision Acuity: Not satisfied - not currently wearing glasses    Eye Comfort: good  Do you use eye drops? : No  Occupation or Hobbies:      Lucy Ramón     Medical, surgical and family histories reviewed and updated 1/13/2022.       OBJECTIVE: See Ophthalmology exam    ASSESSMENT:    ICD-10-CM    1. Presbyopia  H52.4 EYE EXAM (SIMPLE-NONBILLABLE)     REFRACTION   2. Diabetes mellitus without ophthalmic manifestations (H)  E11.9 EYE EXAM (SIMPLE-NONBILLABLE)     REFRACTION   3. Ocular hypertension, bilateral  H40.053 Adult Eye Referral   4. Glaucoma suspect, bilateral  H40.003 Adult Eye Referral        PLAN:  Send to [facility]  for glaucoma testing/ treatment, patient aware without treatment vision could be lost  Glucose control     [facility] aware  eye exam results will be sent to Taylor Fortune.    Violetta Reyes OD

## 2022-02-12 DIAGNOSIS — E11.8 TYPE 2 DIABETES MELLITUS WITH COMPLICATION, WITHOUT LONG-TERM CURRENT USE OF INSULIN (H): ICD-10-CM

## 2022-02-14 RX ORDER — GLIPIZIDE 2.5 MG/1
2.5 TABLET, EXTENDED RELEASE ORAL 2 TIMES DAILY
Qty: 180 TABLET | Refills: 0 | Status: SHIPPED | OUTPATIENT
Start: 2022-02-14 | End: 2022-03-22

## 2022-03-08 ENCOUNTER — TELEPHONE (OUTPATIENT)
Dept: FAMILY MEDICINE | Facility: CLINIC | Age: 52
End: 2022-03-08
Payer: COMMERCIAL

## 2022-03-14 NOTE — PROGRESS NOTES
Pre-Visit Planning   Next 5 appointments (look out 90 days)    Mar 22, 2022 10:00 AM  (Arrive by 9:40 AM)  Provider Visit with Taylor Fortune MD  [facility] ([facility] ) [address] [facility] [address] [phone]        Appointment Notes for this encounter: Diabetic follow up    Questionnaires Reviewed/Assigned  No additional questionnaires are needed    Patient preferred phone number: [phone]  Contacted patient via phone/MyChart. Are there any additional questions or concerns you'd like to review with your provider during your visit? No    Visit is not preventive.    Meds  Home Meds reviewed and updated    Entered patient-preferred pharmacy.      [id number] [place], [place] - [address] [facility] [address]      Current Outpatient Medications   Medication     alcohol swab prep pads     ASPIRIN LOW DOSE 81 MG EC tablet     atorvastatin (LIPITOR) 40 MG tablet     blood glucose (NO BRAND SPECIFIED) test strip     blood glucose calibration (CONTOUR NEXT CONTROL LOW VI SOLN) Low solution     blood glucose calibration (NO BRAND SPECIFIED) solution     blood glucose monitoring (CONTOUR NEXT MONITOR W/DEVICE KIT) meter device kit     blood glucose monitoring (NO BRAND SPECIFIED) meter device kit     blood glucose monitoring (NO BRAND SPECIFIED) meter device kit     CONTOUR NEXT TEST test strip     glipiZIDE (GLUCOTROL XL) 2.5 MG 24 hr tablet     losartan (COZAAR) 25 MG tablet     metFORMIN (GLUCOPHAGE-XR) 500 MG 24 hr tablet     Microlet Lancets MISC     thin (NO BRAND SPECIFIED) lancets     No current facility-administered medications for this visit.       Health Maintenance   Health Maintenance Due   Topic Date Due     ANNUAL REVIEW OF HM ORDERS  Never done     Pneumococcal Vaccine: Pediatrics (0 to 5 Years) and At-Risk Patients (6 to 64 Years) (1 of 2 - PPSV23) Never done     COLORECTAL CANCER SCREENING  Never done     HIV  SCREENING  Never done     HEPATITIS C SCREENING  Never done     HEPATITIS B IMMUNIZATION (1 of 3 - Risk 3-dose series) Never done     DTAP/TDAP/TD IMMUNIZATION (1 - Tdap) Never done     INFLUENZA VACCINE (1) 09/01/2021     COVID-19 Vaccine (3 - Booster for Pfizer series) 12/17/2021     PHQ-2 (once per calendar year)  01/01/2022     PREVENTIVE CARE VISIT  03/04/2022       Health Maintenance reviewed and Health Maintenance orders pended    PhoneGuard  Patient is not active on PhoneGuard.     Questionnaire Review   Discussed questionnair due, can complete prior to appt at check in    Call Summary  Advised patient to call back at [phone] if needed.

## 2022-03-22 ENCOUNTER — OFFICE VISIT (OUTPATIENT)
Dept: FAMILY MEDICINE | Facility: CLINIC | Age: 52
End: 2022-03-22
Payer: COMMERCIAL

## 2022-03-22 VITALS
BODY MASS INDEX: 30.34 KG/M2 | WEIGHT: 177.7 LBS | OXYGEN SATURATION: 98 % | HEART RATE: 97 BPM | RESPIRATION RATE: 16 BRPM | SYSTOLIC BLOOD PRESSURE: 112 MMHG | HEIGHT: 64 IN | TEMPERATURE: 99 F | DIASTOLIC BLOOD PRESSURE: 72 MMHG

## 2022-03-22 DIAGNOSIS — R80.9 MICROALBUMINURIA: ICD-10-CM

## 2022-03-22 DIAGNOSIS — E11.65 TYPE 2 DIABETES MELLITUS WITH HYPERGLYCEMIA, WITHOUT LONG-TERM CURRENT USE OF INSULIN (H): ICD-10-CM

## 2022-03-22 DIAGNOSIS — L98.9 ECZEMATOUS SKIN LESIONS: Primary | ICD-10-CM

## 2022-03-22 DIAGNOSIS — E78.5 HYPERLIPIDEMIA LDL GOAL <100: ICD-10-CM

## 2022-03-22 LAB — HBA1C MFR BLD: 7.3 % (ref 0–5.6)

## 2022-03-22 PROCEDURE — 99214 OFFICE O/P EST MOD 30 MIN: CPT | Performed by: FAMILY MEDICINE

## 2022-03-22 PROCEDURE — 83036 HEMOGLOBIN GLYCOSYLATED A1C: CPT | Performed by: FAMILY MEDICINE

## 2022-03-22 PROCEDURE — 36415 COLL VENOUS BLD VENIPUNCTURE: CPT | Performed by: FAMILY MEDICINE

## 2022-03-22 RX ORDER — METFORMIN HCL 500 MG
TABLET, EXTENDED RELEASE 24 HR ORAL
Qty: 360 TABLET | Refills: 3 | Status: SHIPPED | OUTPATIENT
Start: 2022-03-22 | End: 2022-09-29

## 2022-03-22 RX ORDER — LOSARTAN POTASSIUM 25 MG/1
25 TABLET ORAL DAILY
Qty: 90 TABLET | Refills: 4 | Status: SHIPPED | OUTPATIENT
Start: 2022-03-22 | End: 2022-09-29

## 2022-03-22 RX ORDER — ATORVASTATIN CALCIUM 40 MG/1
40 TABLET, FILM COATED ORAL DAILY
Qty: 90 TABLET | Refills: 4 | Status: SHIPPED | OUTPATIENT
Start: 2022-03-22 | End: 2022-09-29

## 2022-03-22 RX ORDER — TRIAMCINOLONE ACETONIDE 1 MG/G
CREAM TOPICAL
Qty: 30 G | Refills: 0 | Status: SHIPPED | OUTPATIENT
Start: 2022-03-22 | End: 2023-03-16

## 2022-03-22 RX ORDER — GLIPIZIDE 2.5 MG/1
2.5 TABLET, EXTENDED RELEASE ORAL 2 TIMES DAILY
Qty: 180 TABLET | Refills: 4 | Status: SHIPPED | OUTPATIENT
Start: 2022-03-22 | End: 2022-09-29

## 2022-03-22 RX ORDER — TRIAMCINOLONE ACETONIDE 1 MG/G
CREAM TOPICAL
Qty: 30 G | Refills: 0 | Status: SHIPPED | OUTPATIENT
Start: 2022-03-22 | End: 2022-03-22

## 2022-03-22 ASSESSMENT — ENCOUNTER SYMPTOMS
HEADACHES: 0
ABDOMINAL DISTENTION: 0
FATIGUE: 0
ARTHRALGIAS: 0
COUGH: 0
SHORTNESS OF BREATH: 0
ABDOMINAL PAIN: 0
AGITATION: 0

## 2022-03-22 NOTE — PROGRESS NOTES
Assessment & Plan     Type 2 diabetes mellitus with hyperglycemia, without long-term current use of insulin (H)  A1C - 7.3   - Hemoglobin A1c improved markedly   Recommend to continue diet and exercise .  Recommend to continue current medication .    Microalbuminuria  - losartan (COZAAR) 25 MG tablet; Take 1 tablet (25 mg) by mouth daily    Hyperlipidemia LDL goal <100  - atorvastatin (LIPITOR) 40 MG tablet; Take 1 tablet (40 mg) by mouth daily    Eczematous skin lesions  - triamcinolone (KENALOG) 0.1 % external cream; topically on affected area twice a week .      Return in about 6 months (around 9/22/2022) for Routine preventive.    Taylor Fortune MD  [facility] JAKE Green is a 51 year old who presents for the following health issues     Pt is wondering if you could take a look at his hands, he is wondering if he could get some cream for his hands that are dry.    History of Present Illness       Diabetes:   He presents for follow up of diabetes.  He is checking home blood glucose one time daily. He checks blood glucose before and after meals.  Blood glucose is never over 200 and never under 70. When his blood glucose is low, the patient is asymptomatic for confusion, blurred vision, lethargy and reports not feeling dizzy, shaky, or weak.  He has no concerns regarding his diabetes at this time.  He is not experiencing numbness or burning in feet, excessive thirst, blurry vision, weight changes or redness, sores or blisters on feet.         He eats 0-1 servings of fruits and vegetables daily.He consumes 0 sweetened beverage(s) daily.He exercises with enough effort to increase his heart rate 10 to 19 minutes per day.  He exercises with enough effort to increase his heart rate 3 or less days per week. He is missing 1 dose(s) of medications per week.           Review of Systems   Constitutional: Negative for fatigue.   HENT: Negative for congestion.    Respiratory: Negative for cough  "and shortness of breath.    Gastrointestinal: Negative for abdominal distention and abdominal pain.   Musculoskeletal: Negative for arthralgias.   Skin: Positive for rash.   Neurological: Negative for headaches.   Psychiatric/Behavioral: Negative for agitation and behavioral problems.            Objective    /72   Pulse 97   Temp 99  F (37.2  C) (Oral)   Resp 16   Ht 1.626 m (5' 4\")   Wt 80.6 kg (177 lb 11.2 oz)   SpO2 98%   BMI 30.50 kg/m    Body mass index is 30.5 kg/m .  Physical Exam  HENT:      Mouth/Throat:      Mouth: Mucous membranes are moist.   Pulmonary:      Effort: Pulmonary effort is normal.   Abdominal:      General: Abdomen is flat.   Musculoskeletal:         General: Normal range of motion.   Skin:     Findings: Rash present.   Neurological:      General: No focal deficit present.   Psychiatric:         Mood and Affect: Mood normal.                "

## 2022-04-09 DIAGNOSIS — E11.9 TYPE 2 DIABETES MELLITUS WITHOUT COMPLICATION, WITHOUT LONG-TERM CURRENT USE OF INSULIN (H): ICD-10-CM

## 2022-04-11 NOTE — TELEPHONE ENCOUNTER
Prescription approved per [facility] Refill Protocol.  Mily LONDONO RN      Peripheral Line Insertion

## 2022-09-13 NOTE — PROGRESS NOTES
Pre-Visit Planning   Next 5 appointments (look out 90 days)    Sep 29, 2022 11:30 AM  (Arrive by 11:10 AM)  Adult Preventative Visit with Taylor Fortune MD  [facility] ([facility] ) [address] [address] [address] [phone]        Appointment Notes for this encounter: some issues with urination    Questionnaires Reviewed/Assigned No additional questionnaires are needed     Contacted patient via phone with [place] . . Are there any additional questions or concerns you'd like to review with your provider during your visit? Yes: some issues with urination     Visit is preventive. Reviewed purpose of preventive visit with patient.    Meds  Home Meds reviewed and updated    Entered patient-preferred pharmacy.     Current Outpatient Medications   Medication     alcohol swab prep pads     ASPIRIN LOW DOSE 81 MG EC tablet     atorvastatin (LIPITOR) 40 MG tablet     blood glucose (NO BRAND SPECIFIED) test strip     blood glucose calibration (CONTOUR NEXT CONTROL LOW VI SOLN) Low solution     blood glucose calibration (NO BRAND SPECIFIED) solution     blood glucose monitoring (CONTOUR NEXT MONITOR W/DEVICE KIT) meter device kit     blood glucose monitoring (NO BRAND SPECIFIED) meter device kit     blood glucose monitoring (NO BRAND SPECIFIED) meter device kit     CONTOUR NEXT TEST test strip     glipiZIDE (GLUCOTROL XL) 2.5 MG 24 hr tablet     losartan (COZAAR) 25 MG tablet     metFORMIN (GLUCOPHAGE-XR) 500 MG 24 hr tablet     Microlet Lancets MISC     thin (NO BRAND SPECIFIED) lancets     triamcinolone (KENALOG) 0.1 % external cream     No current facility-administered medications for this visit.     Anthony  declined    Call Summary  Advised patient to call back at [phone] if needed.     Soco Acuña RN

## 2022-09-29 ENCOUNTER — OFFICE VISIT (OUTPATIENT)
Dept: FAMILY MEDICINE | Facility: CLINIC | Age: 52
End: 2022-09-29
Payer: COMMERCIAL

## 2022-09-29 VITALS
RESPIRATION RATE: 16 BRPM | BODY MASS INDEX: 30.9 KG/M2 | HEART RATE: 92 BPM | SYSTOLIC BLOOD PRESSURE: 117 MMHG | WEIGHT: 181 LBS | DIASTOLIC BLOOD PRESSURE: 80 MMHG | HEIGHT: 64 IN

## 2022-09-29 DIAGNOSIS — Z11.4 SCREENING FOR HIV (HUMAN IMMUNODEFICIENCY VIRUS): ICD-10-CM

## 2022-09-29 DIAGNOSIS — E11.9 TYPE 2 DIABETES MELLITUS WITHOUT COMPLICATION, WITHOUT LONG-TERM CURRENT USE OF INSULIN (H): ICD-10-CM

## 2022-09-29 DIAGNOSIS — E78.5 HYPERLIPIDEMIA LDL GOAL <100: ICD-10-CM

## 2022-09-29 DIAGNOSIS — R80.9 TYPE 2 DIABETES MELLITUS WITH MICROALBUMINURIA, WITHOUT LONG-TERM CURRENT USE OF INSULIN (H): ICD-10-CM

## 2022-09-29 DIAGNOSIS — Z00.00 ROUTINE GENERAL MEDICAL EXAMINATION AT A HEALTH CARE FACILITY: Primary | ICD-10-CM

## 2022-09-29 DIAGNOSIS — E11.29 TYPE 2 DIABETES MELLITUS WITH MICROALBUMINURIA, WITHOUT LONG-TERM CURRENT USE OF INSULIN (H): ICD-10-CM

## 2022-09-29 DIAGNOSIS — Z12.11 SCREEN FOR COLON CANCER: ICD-10-CM

## 2022-09-29 DIAGNOSIS — R80.9 MICROALBUMINURIA: ICD-10-CM

## 2022-09-29 DIAGNOSIS — N39.41 URGE INCONTINENCE OF URINE: ICD-10-CM

## 2022-09-29 DIAGNOSIS — Z11.59 NEED FOR HEPATITIS C SCREENING TEST: ICD-10-CM

## 2022-09-29 LAB
ALBUMIN UR-MCNC: NEGATIVE MG/DL
ANION GAP SERPL CALCULATED.3IONS-SCNC: 4 MMOL/L (ref 3–14)
APPEARANCE UR: CLEAR
BILIRUB UR QL STRIP: NEGATIVE
BUN SERPL-MCNC: 9 MG/DL (ref 7–30)
CALCIUM SERPL-MCNC: 9.8 MG/DL (ref 8.5–10.1)
CHLORIDE BLD-SCNC: 104 MMOL/L (ref 94–109)
CO2 SERPL-SCNC: 30 MMOL/L (ref 20–32)
COLOR UR AUTO: YELLOW
CREAT SERPL-MCNC: 0.96 MG/DL (ref 0.66–1.25)
GFR SERPL CREATININE-BSD FRML MDRD: >90 ML/MIN/1.73M2
GLUCOSE BLD-MCNC: 104 MG/DL (ref 70–99)
GLUCOSE UR STRIP-MCNC: NEGATIVE MG/DL
HBA1C MFR BLD: 7.5 % (ref 0–5.6)
HGB UR QL STRIP: ABNORMAL
KETONES UR STRIP-MCNC: NEGATIVE MG/DL
LEUKOCYTE ESTERASE UR QL STRIP: NEGATIVE
NITRATE UR QL: NEGATIVE
PH UR STRIP: 5.5 [PH] (ref 5–7)
POTASSIUM BLD-SCNC: 4.1 MMOL/L (ref 3.4–5.3)
RBC #/AREA URNS AUTO: NORMAL /HPF
SODIUM SERPL-SCNC: 138 MMOL/L (ref 133–144)
SP GR UR STRIP: 1.02 (ref 1–1.03)
UROBILINOGEN UR STRIP-ACNC: 0.2 E.U./DL
WBC #/AREA URNS AUTO: NORMAL /HPF

## 2022-09-29 PROCEDURE — 87389 HIV-1 AG W/HIV-1&-2 AB AG IA: CPT | Performed by: FAMILY MEDICINE

## 2022-09-29 PROCEDURE — 83036 HEMOGLOBIN GLYCOSYLATED A1C: CPT | Performed by: FAMILY MEDICINE

## 2022-09-29 PROCEDURE — 80048 BASIC METABOLIC PNL TOTAL CA: CPT | Performed by: FAMILY MEDICINE

## 2022-09-29 PROCEDURE — 81001 URINALYSIS AUTO W/SCOPE: CPT | Performed by: FAMILY MEDICINE

## 2022-09-29 PROCEDURE — 99396 PREV VISIT EST AGE 40-64: CPT | Performed by: FAMILY MEDICINE

## 2022-09-29 PROCEDURE — 86803 HEPATITIS C AB TEST: CPT | Performed by: FAMILY MEDICINE

## 2022-09-29 PROCEDURE — 36415 COLL VENOUS BLD VENIPUNCTURE: CPT | Performed by: FAMILY MEDICINE

## 2022-09-29 RX ORDER — LOSARTAN POTASSIUM 25 MG/1
25 TABLET ORAL DAILY
Qty: 90 TABLET | Refills: 4 | Status: SHIPPED | OUTPATIENT
Start: 2022-09-29 | End: 2023-03-16

## 2022-09-29 RX ORDER — ATORVASTATIN CALCIUM 40 MG/1
40 TABLET, FILM COATED ORAL DAILY
Qty: 90 TABLET | Refills: 4 | Status: SHIPPED | OUTPATIENT
Start: 2022-09-29 | End: 2023-03-16

## 2022-09-29 RX ORDER — GLIPIZIDE 2.5 MG/1
2.5 TABLET, EXTENDED RELEASE ORAL 2 TIMES DAILY
Qty: 180 TABLET | Refills: 4 | Status: SHIPPED | OUTPATIENT
Start: 2022-09-29 | End: 2023-03-16

## 2022-09-29 RX ORDER — METFORMIN HCL 500 MG
TABLET, EXTENDED RELEASE 24 HR ORAL
Qty: 360 TABLET | Refills: 3 | Status: SHIPPED | OUTPATIENT
Start: 2022-09-29 | End: 2023-03-16

## 2022-09-29 SDOH — ECONOMIC STABILITY: FOOD INSECURITY: WITHIN THE PAST 12 MONTHS, THE FOOD YOU BOUGHT JUST DIDN'T LAST AND YOU DIDN'T HAVE MONEY TO GET MORE.: NEVER TRUE

## 2022-09-29 SDOH — HEALTH STABILITY: PHYSICAL HEALTH: ON AVERAGE, HOW MANY MINUTES DO YOU ENGAGE IN EXERCISE AT THIS LEVEL?: 30 MIN

## 2022-09-29 SDOH — ECONOMIC STABILITY: INCOME INSECURITY: HOW HARD IS IT FOR YOU TO PAY FOR THE VERY BASICS LIKE FOOD, HOUSING, MEDICAL CARE, AND HEATING?: NOT HARD AT ALL

## 2022-09-29 SDOH — ECONOMIC STABILITY: INCOME INSECURITY: IN THE LAST 12 MONTHS, WAS THERE A TIME WHEN YOU WERE NOT ABLE TO PAY THE MORTGAGE OR RENT ON TIME?: PATIENT REFUSED

## 2022-09-29 SDOH — ECONOMIC STABILITY: FOOD INSECURITY: WITHIN THE PAST 12 MONTHS, YOU WORRIED THAT YOUR FOOD WOULD RUN OUT BEFORE YOU GOT MONEY TO BUY MORE.: NEVER TRUE

## 2022-09-29 SDOH — ECONOMIC STABILITY: TRANSPORTATION INSECURITY
IN THE PAST 12 MONTHS, HAS THE LACK OF TRANSPORTATION KEPT YOU FROM MEDICAL APPOINTMENTS OR FROM GETTING MEDICATIONS?: NO

## 2022-09-29 SDOH — HEALTH STABILITY: PHYSICAL HEALTH: ON AVERAGE, HOW MANY DAYS PER WEEK DO YOU ENGAGE IN MODERATE TO STRENUOUS EXERCISE (LIKE A BRISK WALK)?: 3 DAYS

## 2022-09-29 SDOH — ECONOMIC STABILITY: TRANSPORTATION INSECURITY
IN THE PAST 12 MONTHS, HAS LACK OF TRANSPORTATION KEPT YOU FROM MEETINGS, WORK, OR FROM GETTING THINGS NEEDED FOR DAILY LIVING?: NO

## 2022-09-29 ASSESSMENT — SOCIAL DETERMINANTS OF HEALTH (SDOH)
DO YOU BELONG TO ANY CLUBS OR ORGANIZATIONS SUCH AS CHURCH GROUPS UNIONS, FRATERNAL OR ATHLETIC GROUPS, OR SCHOOL GROUPS?: YES
IN A TYPICAL WEEK, HOW MANY TIMES DO YOU TALK ON THE PHONE WITH FAMILY, FRIENDS, OR NEIGHBORS?: MORE THAN THREE TIMES A WEEK
HOW OFTEN DO YOU GET TOGETHER WITH FRIENDS OR RELATIVES?: THREE TIMES A WEEK
HOW OFTEN DO YOU ATTEND CHURCH OR RELIGIOUS SERVICES?: MORE THAN 4 TIMES PER YEAR

## 2022-09-29 ASSESSMENT — ENCOUNTER SYMPTOMS
CONSTIPATION: 0
HEMATOCHEZIA: 0
DIZZINESS: 0
HEMATURIA: 0
FEVER: 0
ABDOMINAL PAIN: 0
CHILLS: 0
HEARTBURN: 0
PARESTHESIAS: 0
PALPITATIONS: 0
HEADACHES: 0
EYE PAIN: 0
SORE THROAT: 0
FREQUENCY: 0
ARTHRALGIAS: 0
MYALGIAS: 0
DIARRHEA: 0
NAUSEA: 0
DYSURIA: 0
WEAKNESS: 0
NERVOUS/ANXIOUS: 0
SHORTNESS OF BREATH: 0
JOINT SWELLING: 0
COUGH: 0

## 2022-09-29 ASSESSMENT — LIFESTYLE VARIABLES
HOW MANY STANDARD DRINKS CONTAINING ALCOHOL DO YOU HAVE ON A TYPICAL DAY: PATIENT DOES NOT DRINK
SKIP TO QUESTIONS 9-10: 1
HOW OFTEN DO YOU HAVE SIX OR MORE DRINKS ON ONE OCCASION: NEVER
AUDIT-C TOTAL SCORE: 0
HOW OFTEN DO YOU HAVE A DRINK CONTAINING ALCOHOL: NEVER

## 2022-09-29 NOTE — LETTER
October 5, 2022      Norma Arcos  [address]  [place] [address]        Dear ,    We are writing to inform you of your test results.        A1C mildly elevated .   Recommend to continue current medication , low sugar diet .   Patient urine return normal no infection or blood .   Increased urination likely due to diabetes .     Recommend diet modification , current medication no additional medications are required .       Resulted Orders   HIV Antigen Antibody Combo   Result Value Ref Range    HIV Antigen Antibody Combo Nonreactive Nonreactive      Comment:      HIV-1 p24 Ag & HIV-1/HIV-2 Ab Not Detected   Hepatitis C Screen Reflex to HCV RNA Quant and Genotype   Result Value Ref Range    Hepatitis C Antibody Nonreactive Nonreactive    Narrative    Assay performance characteristics have not been established for newborns, infants, and children.   HEMOGLOBIN A1C   Result Value Ref Range    Hemoglobin A1C 7.5 (H) 0.0 - 5.6 %      Comment:      Normal <5.7%   Prediabetes 5.7-6.4%    Diabetes 6.5% or higher     Note: Adopted from ADA consensus guidelines.   Basic metabolic panel  (Ca, Cl, CO2, Creat, Gluc, K, Na, BUN)   Result Value Ref Range    Sodium 138 133 - 144 mmol/L    Potassium 4.1 3.4 - 5.3 mmol/L    Chloride 104 94 - 109 mmol/L    Carbon Dioxide (CO2) 30 20 - 32 mmol/L    Anion Gap 4 3 - 14 mmol/L    Urea Nitrogen 9 7 - 30 mg/dL    Creatinine 0.96 0.66 - 1.25 mg/dL    Calcium 9.8 8.5 - 10.1 mg/dL    Glucose 104 (H) 70 - 99 mg/dL    GFR Estimate >90 >60 mL/min/1.73m2      Comment:      Effective December 21, 2021 eGFRcr in adults is calculated using the 2021 CKD-EPI creatinine equation which includes age and gender (Marko wells al., NEJM, DOI: 10.1056/QMGYgx1506140)   UA Macro with Reflex to Micro and Culture - lab collect   Result Value Ref Range    Color Urine Yellow Colorless, Straw, Light Yellow, Yellow    Appearance Urine Clear Clear    Glucose Urine Negative Negative mg/dL    Bilirubin  Urine Negative Negative    Ketones Urine Negative Negative mg/dL    Specific Gravity Urine 1.020 1.003 - 1.035    Blood Urine Small (A) Negative    pH Urine 5.5 5.0 - 7.0    Protein Albumin Urine Negative Negative mg/dL    Urobilinogen Urine 0.2 0.2, 1.0 E.U./dL    Nitrite Urine Negative Negative    Leukocyte Esterase Urine Negative Negative   Urine Microscopic   Result Value Ref Range    RBC Urine 0-2 0-2 /HPF /HPF    WBC Urine 0-5 0-5 /HPF /HPF    Narrative    Urine Culture not indicated       If you have any questions or concerns, please call the clinic at the number listed above.       Sincerely,      Taylor Fortune MD

## 2022-09-29 NOTE — PROGRESS NOTES
SUBJECTIVE:   CC: Norma is an 52 year old who presents for preventative health visit.   In clinic for annual physical exam   Doing well  .  Feels he has to rush to the bathroom sometimes .   Also feel numbness of left arm occassionally .       Patient has been advised of split billing requirements and indicates understanding: Yes  Healthy Habits:     Getting at least 3 servings of Calcium per day:  Yes    Bi-annual eye exam:  Yes    Dental care twice a year:  NO    Sleep apnea or symptoms of sleep apnea:  None    Diet:  Regular (no restrictions)    Frequency of exercise:  2-3 days/week    Duration of exercise:  15-30 minutes    Taking medications regularly:  Yes    Medication side effects:  Not applicable    PHQ-2 Total Score: 0        Today's PHQ-2 Score:   PHQ-2 ( 1999 Pfizer) 3/22/2022   Q1: Little interest or pleasure in doing things 0   Q2: Feeling down, depressed or hopeless 0   PHQ-2 Score 0   PHQ-2 Total Score (12-17 Years)- Positive if 3 or more points; Administer PHQ-A if positive -   Q1: Little interest or pleasure in doing things Not at all   Q2: Feeling down, depressed or hopeless Not at all   PHQ-2 Score 0       Abuse: Current or Past(Physical, Sexual or Emotional)- No  Do you feel safe in your environment? Yes        Social History     Tobacco Use     Smoking status: Never Smoker     Smokeless tobacco: Never Used   Substance Use Topics     Alcohol use: Yes     Comment: Socially     If you drink alcohol do you typically have >3 drinks per day or >7 drinks per week? Not applicable    Alcohol Use 3/4/2021   Prescreen: >3 drinks/day or >7 drinks/week? Not Applicable   Prescreen: >3 drinks/day or >7 drinks/week? -       Last PSA:   PSA   Date Value Ref Range Status   03/26/2018 0.30 0 - 4 ug/L Final     Comment:     Assay Method:  Chemiluminescence using Siemens Vista analyzer       Reviewed orders with patient. Reviewed health maintenance and updated orders accordingly - Yes      Reviewed and updated as  "needed this visit by clinical staff   Tobacco  Allergies  Meds                Reviewed and updated as needed this visit by Provider                   Past Medical History:   Diagnosis Date     Hyperlipidemia LDL goal <100 6/8/2019     Type 2 diabetes mellitus without complication, with long-term current use of insulin (H) 5/1/2018      No past surgical history on file.    Review of Systems   Constitutional: Negative for chills and fever.   HENT: Negative for congestion, ear pain, hearing loss and sore throat.    Eyes: Negative for pain and visual disturbance.   Respiratory: Negative for cough and shortness of breath.    Cardiovascular: Negative for chest pain, palpitations and peripheral edema.   Gastrointestinal: Negative for abdominal pain, constipation, diarrhea, heartburn, hematochezia and nausea.   Genitourinary: Negative for dysuria, frequency, genital sores, hematuria and urgency.   Musculoskeletal: Negative for arthralgias, joint swelling and myalgias.   Skin: Negative for rash.   Neurological: Negative for dizziness, weakness, headaches and paresthesias.   Psychiatric/Behavioral: Negative for mood changes. The patient is not nervous/anxious.          OBJECTIVE:   /80 (BP Location: Right arm, Patient Position: Chair, Cuff Size: Adult Large)   Pulse 92   Resp 16   Ht 1.613 m (5' 3.5\")   Wt 82.1 kg (181 lb)   BMI 31.56 kg/m      Physical Exam  GENERAL: healthy, alert and no distress  EYES: Eyes grossly normal to inspection, PERRL and conjunctivae and sclerae normal  HENT: ear canals and TM's normal, nose and mouth without ulcers or lesions  NECK: no adenopathy, no asymmetry, masses, or scars and thyroid normal to palpation  RESP: lungs clear to auscultation - no rales, rhonchi or wheezes  CV: regular rate and rhythm, normal S1 S2, no S3 or S4, no murmur, click or rub, no peripheral edema and peripheral pulses strong  ABDOMEN: soft, nontender, no hepatosplenomegaly, no masses and bowel sounds " normal  MS: no gross musculoskeletal defects noted, no edema  SKIN: no suspicious lesions or rashes  NEURO: Normal strength and tone, mentation intact and speech normal  PSYCH: mentation appears normal, affect normal/bright    Diagnostic Test Results:  Labs reviewed in Epic    ASSESSMENT/PLAN:   (Z00.00) Routine general medical examination at a health care facility  (primary encounter diagnosis)  Comment: Discussed healthy eating   Discussed maintaining ideal weight   Plan:     (Z12.11) Screen for colon cancer  Comment: Deferred by patient .    (Z11.4) Screening for HIV (human immunodeficiency virus)  Comment:  Plan: HIV Antigen Antibody Combo          (Z11.59) Need for hepatitis C screening test  Comment:   Plan: Hepatitis C Screen Reflex to HCV RNA Quant and         Genotype            (E11.8) Type 2 diabetes mellitus with complication, without long-term current use of insulin (H)  Comment: Discussed healthy eating   Plan: HEMOGLOBIN A1C, Basic metabolic panel  (Ca, Cl,        CO2, Creat, Gluc, K, Na, BUN), glipiZIDE         (GLUCOTROL XL) 2.5 MG 24 hr tablet, metFORMIN         (GLUCOPHAGE XR) 500 MG 24 hr tablet, Adult Eye          Referral            (N39.41) Urge incontinence of urine  Comment:   Plan: UA Macro with Reflex to Micro and Culture - lab        collect, Urine Microscopic           (E78.5) Hyperlipidemia LDL goal <100  Comment:   Plan: atorvastatin (LIPITOR) 40 MG tablet            (R80.9) Microalbuminuria  Comment:   Plan: losartan (COZAAR) 25 MG tablet          (E11.9) DM 2, no insulin (H)  Comment:   Plan: blood glucose (CONTOUR NEXT TEST) test strip          Left arm numbness - could be due to carpal tunnel   Discussed brace and stretching , discussed further testing if fails to improve .    Patient has been advised of split billing requirements and indicates understanding: Yes    COUNSELING:   Reviewed preventive health counseling, as reflected in patient instructions       Regular  "exercise       Healthy diet/nutrition       Vision screening       Hearing screening    Estimated body mass index is 31.56 kg/m  as calculated from the following:    Height as of this encounter: 1.613 m (5' 3.5\").    Weight as of this encounter: 82.1 kg (181 lb).     Weight management plan: Discussed healthy diet and exercise guidelines    He reports that he has never smoked. He has never used smokeless tobacco.      Counseling Resources:  ATP IV Guidelines  Pooled Cohorts Equation Calculator  FRAX Risk Assessment  ICSI Preventive Guidelines  Dietary Guidelines for Americans, 2010  USDA's MyPlate  ASA Prophylaxis  Lung CA Screening    Taylor Fortune MD  [facility]  "

## 2022-09-30 LAB
HCV AB SERPL QL IA: NONREACTIVE
HIV 1+2 AB+HIV1 P24 AG SERPL QL IA: NONREACTIVE

## 2022-12-12 ENCOUNTER — TELEPHONE (OUTPATIENT)
Dept: FAMILY MEDICINE | Facility: CLINIC | Age: 52
End: 2022-12-12

## 2022-12-12 NOTE — LETTER
December 12, 2022      Norma Arcos  [address]  [place] [address]        Dear Norma,     Just a reminder you are due for a recheck in March.   My schedules are filling up quickly so please call as soon as you are able to schedule this appointment.     You can call [phone] to schedule this recheck.       Sincerely,        Taylor Fortune MD

## 2022-12-23 DIAGNOSIS — E11.9 TYPE 2 DIABETES MELLITUS WITHOUT COMPLICATION, WITHOUT LONG-TERM CURRENT USE OF INSULIN (H): ICD-10-CM

## 2022-12-23 NOTE — TELEPHONE ENCOUNTER
Prescription approved per [facility] Refill Protocol.    Bonita VARELA RN

## 2023-01-16 ENCOUNTER — TELEPHONE (OUTPATIENT)
Dept: FAMILY MEDICINE | Facility: CLINIC | Age: 53
End: 2023-01-16

## 2023-01-16 NOTE — LETTER
January 16, 2023      Norma Arcos  [address] [address]        Dear Norma,       You are due for your 6 month diabetic recheck with Dr. Fortune in March.       Please call [phone] for help with scheduling this appointment.       Sincerely,        Soco Acuña RN

## 2023-01-27 ENCOUNTER — TELEPHONE (OUTPATIENT)
Dept: FAMILY MEDICINE | Facility: CLINIC | Age: 53
End: 2023-01-27

## 2023-01-27 NOTE — TELEPHONE ENCOUNTER
Patient Quality Outreach    Patient is due for the following:   Diabetes -  A1C, Eye Exam and Diabetic Follow-Up Visit  Colon Cancer Screening    Next Steps:   No follow up needed at this time.    Type of outreach:    Chart review performed, no outreach needed. Items were added to 3/16/2023 Appt.     Next Steps:  Reach out within 90 days via Phone.    Max number of attempts reached: Yes. Will try again in 90 days if patient still on fail list.    Questions for provider review:    None     Addie Song MA  Chart routed to Care Team.

## 2023-03-13 ENCOUNTER — TELEPHONE (OUTPATIENT)
Dept: FAMILY MEDICINE | Facility: CLINIC | Age: 53
End: 2023-03-13

## 2023-03-13 NOTE — PROGRESS NOTES
Pre-Visit Planning   Next 5 appointments (look out 90 days)    Mar 16, 2023 10:00 AM  (Arrive by 9:40 AM)  Provider Visit with Taylor Fortune MD, VIDEO,   [facility] ([facility] ) [address] [address] [phone]        Appointment Notes for this encounter:   DM follow up, A1c, lipid, DM eye exam,colon. PHQ2 For  over the phone please dial [phone] option 0 or use iPad.    Questionnaires Reviewed/Assigned  P)HQ 2     Patient preferred phone number: [phone]      Contacted patient with  via phone- . Are there any additional questions or concerns you'd like to review with your provider during your visit? No    Visit is not preventive.    Meds  Home Meds reviewed and updated    Entered patient-preferred pharmacy.     Current Outpatient Medications   Medication     alcohol swab prep pads     atorvastatin (LIPITOR) 40 MG tablet     blood glucose (NO BRAND SPECIFIED) test strip     blood glucose calibration (NO BRAND SPECIFIED) solution     blood glucose monitoring (NO BRAND SPECIFIED) meter device kit     glipiZIDE (GLUCOTROL XL) 2.5 MG 24 hr tablet     losartan (COZAAR) 25 MG tablet     metFORMIN (GLUCOPHAGE XR) 500 MG 24 hr tablet     thin (NO BRAND SPECIFIED) lancets     triamcinolone (KENALOG) 0.1 % external cream     ASPIRIN LOW DOSE 81 MG EC tablet     blood glucose (CONTOUR NEXT TEST) test strip     No current facility-administered medications for this visit.     Anthony howard    Call Summary  Advised patient to call back at [phone] if needed.     Soco Acuña RN

## 2023-03-16 ENCOUNTER — OFFICE VISIT (OUTPATIENT)
Dept: FAMILY MEDICINE | Facility: CLINIC | Age: 53
End: 2023-03-16
Payer: COMMERCIAL

## 2023-03-16 VITALS
TEMPERATURE: 98 F | DIASTOLIC BLOOD PRESSURE: 68 MMHG | OXYGEN SATURATION: 100 % | HEART RATE: 78 BPM | BODY MASS INDEX: 30.39 KG/M2 | RESPIRATION RATE: 16 BRPM | SYSTOLIC BLOOD PRESSURE: 110 MMHG | HEIGHT: 64 IN | WEIGHT: 178 LBS

## 2023-03-16 DIAGNOSIS — E78.5 HYPERLIPIDEMIA LDL GOAL <100: ICD-10-CM

## 2023-03-16 DIAGNOSIS — Z13.220 SCREENING FOR HYPERLIPIDEMIA: ICD-10-CM

## 2023-03-16 DIAGNOSIS — L98.9 ECZEMATOUS SKIN LESIONS: ICD-10-CM

## 2023-03-16 DIAGNOSIS — E11.8 TYPE 2 DIABETES MELLITUS WITH COMPLICATION, WITHOUT LONG-TERM CURRENT USE OF INSULIN (H): ICD-10-CM

## 2023-03-16 DIAGNOSIS — Z12.11 SCREEN FOR COLON CANCER: ICD-10-CM

## 2023-03-16 DIAGNOSIS — R80.9 MICROALBUMINURIA: ICD-10-CM

## 2023-03-16 LAB
ANION GAP SERPL CALCULATED.3IONS-SCNC: 13 MMOL/L (ref 7–15)
BUN SERPL-MCNC: 11.8 MG/DL (ref 6–20)
CALCIUM SERPL-MCNC: 10.9 MG/DL (ref 8.6–10)
CHLORIDE SERPL-SCNC: 102 MMOL/L (ref 98–107)
CHOLEST SERPL-MCNC: 135 MG/DL
CREAT SERPL-MCNC: 0.89 MG/DL (ref 0.67–1.17)
CREAT UR-MCNC: 175 MG/DL
DEPRECATED HCO3 PLAS-SCNC: 25 MMOL/L (ref 22–29)
GFR SERPL CREATININE-BSD FRML MDRD: >90 ML/MIN/1.73M2
GLUCOSE SERPL-MCNC: 181 MG/DL (ref 70–99)
HBA1C MFR BLD: 12.3 % (ref 0–5.6)
HDLC SERPL-MCNC: 44 MG/DL
LDLC SERPL CALC-MCNC: 74 MG/DL
MICROALBUMIN UR-MCNC: 68.2 MG/L
MICROALBUMIN/CREAT UR: 38.97 MG/G CR (ref 0–17)
NONHDLC SERPL-MCNC: 91 MG/DL
POTASSIUM SERPL-SCNC: 4.2 MMOL/L (ref 3.4–5.3)
SODIUM SERPL-SCNC: 140 MMOL/L (ref 136–145)
TRIGL SERPL-MCNC: 85 MG/DL

## 2023-03-16 PROCEDURE — 99214 OFFICE O/P EST MOD 30 MIN: CPT | Performed by: FAMILY MEDICINE

## 2023-03-16 PROCEDURE — 83036 HEMOGLOBIN GLYCOSYLATED A1C: CPT | Performed by: FAMILY MEDICINE

## 2023-03-16 PROCEDURE — 80061 LIPID PANEL: CPT | Performed by: FAMILY MEDICINE

## 2023-03-16 PROCEDURE — 36415 COLL VENOUS BLD VENIPUNCTURE: CPT | Performed by: FAMILY MEDICINE

## 2023-03-16 PROCEDURE — 80048 BASIC METABOLIC PNL TOTAL CA: CPT | Performed by: FAMILY MEDICINE

## 2023-03-16 PROCEDURE — 82570 ASSAY OF URINE CREATININE: CPT | Performed by: FAMILY MEDICINE

## 2023-03-16 PROCEDURE — 82043 UR ALBUMIN QUANTITATIVE: CPT | Performed by: FAMILY MEDICINE

## 2023-03-16 PROCEDURE — 99207 PR FOOT EXAM NO CHARGE: CPT | Performed by: FAMILY MEDICINE

## 2023-03-16 RX ORDER — TRIAMCINOLONE ACETONIDE 1 MG/G
CREAM TOPICAL
Qty: 30 G | Refills: 0 | Status: SHIPPED | OUTPATIENT
Start: 2023-03-16

## 2023-03-16 RX ORDER — METFORMIN HCL 500 MG
TABLET, EXTENDED RELEASE 24 HR ORAL
Qty: 360 TABLET | Refills: 3 | Status: SHIPPED | OUTPATIENT
Start: 2023-03-16 | End: 2023-03-16 | Stop reason: DRUGHIGH

## 2023-03-16 RX ORDER — ATORVASTATIN CALCIUM 40 MG/1
40 TABLET, FILM COATED ORAL DAILY
Qty: 90 TABLET | Refills: 4 | Status: SHIPPED | OUTPATIENT
Start: 2023-03-16 | End: 2023-06-13

## 2023-03-16 RX ORDER — GLIPIZIDE 10 MG/1
10 TABLET, FILM COATED, EXTENDED RELEASE ORAL DAILY
Qty: 90 TABLET | Refills: 0 | Status: SHIPPED | OUTPATIENT
Start: 2023-03-16 | End: 2023-06-13

## 2023-03-16 RX ORDER — LOSARTAN POTASSIUM 25 MG/1
25 TABLET ORAL DAILY
Qty: 90 TABLET | Refills: 4 | Status: SHIPPED | OUTPATIENT
Start: 2023-03-16 | End: 2023-06-13

## 2023-03-16 ASSESSMENT — ENCOUNTER SYMPTOMS
ARTHRALGIAS: 0
AGITATION: 0
ABDOMINAL DISTENTION: 0
NUMBNESS: 0

## 2023-03-16 NOTE — PROGRESS NOTES
Assessment & Plan     Type 2 diabetes mellitus with complication, without long-term current use of insulin (H)  Diabetes uncontrolled   Patient was only taking 1 metformin 500 mg bid .  - Lipid panel reflex to direct LDL Non-fasting  - Albumin Random Urine Quantitative with Creat Ratio  - Adult Eye  Referral; Future  - Hemoglobin A1c  - Basic metabolic panel  (Ca, Cl, CO2, Creat, Gluc, K, Na, BUN)  - FOOT EXAM  - glipiZIDE (GLUCOTROL XL) 10 MG 24 hr tablet; Take 1 tablet (10 mg) by mouth daily  - metFORMIN (GLUCOPHAGE) 1000 MG tablet; Take 1 tablet (1,000 mg) by mouth 2 times daily (with meals)    Hyperlipidemia LDL goal <100  - atorvastatin (LIPITOR) 40 MG tablet; Take 1 tablet (40 mg) by mouth daily    Screen for colon cancer    - Fecal colorectal cancer screen (FIT); Future  - Fecal colorectal cancer screen (FIT)    Eczematous skin lesions    - triamcinolone (KENALOG) 0.1 % external cream; topically on affected area twice a week .    Microalbuminuria  - losartan (COZAAR) 25 MG tablet; Take 1 tablet (25 mg) by mouth daily    Screening for hyperlipidemia    - Lipid panel reflex to direct LDL Non-fasting        Return in about 3 months (around 6/16/2023) for Follow up.    Taylor Fortune MD  [facility]    Tushar Green is a 52 year old, presenting for the following health issues:  Diabetes      HPI     Diabetes Follow-up    How often are you checking your blood sugar? A few times a week  What time of day are you checking your blood sugars (select all that apply)?  Before and after meals Or when he feels different  Have you had any blood sugars above 200?  Yes About 300 after a meal  Have you had any blood sugars below 70?  No    What symptoms do you notice when your blood sugar is low?  None and Not applicable    What concerns do you have today about your diabetes? None     Do you have any of these symptoms? (Select all that apply)  Excessive thirst and Weight gain    Have you had  "a diabetic eye exam in the last 12 months? No        BP Readings from Last 2 Encounters:   03/16/23 110/68   09/29/22 117/80     Hemoglobin A1C (%)   Date Value   09/29/2022 7.5 (H)   03/22/2022 7.3 (H)   03/04/2021 10.1 (H)   08/20/2020 7.7 (H)     LDL Cholesterol Calculated (mg/dL)   Date Value   12/14/2021 51   03/04/2021 59   08/20/2020 106 (H)         Review of Systems   HENT: Negative for congestion.    Cardiovascular: Negative for chest pain.   Gastrointestinal: Negative for abdominal distention.   Musculoskeletal: Negative for arthralgias.   Neurological: Negative for numbness.   Psychiatric/Behavioral: Negative for agitation and behavioral problems.            Objective    /68   Pulse 78   Temp 98  F (36.7  C) (Oral)   Resp 16   Ht 1.613 m (5' 3.5\")   Wt 80.7 kg (178 lb)   SpO2 100%   BMI 31.04 kg/m    Body mass index is 31.04 kg/m .  Physical Exam  Cardiovascular:      Rate and Rhythm: Normal rate.      Pulses: Normal pulses.   Pulmonary:      Effort: Pulmonary effort is normal.   Abdominal:      General: Abdomen is flat.   Skin:     General: Skin is warm.   Neurological:      General: No focal deficit present.   Psychiatric:         Mood and Affect: Mood normal.                  "

## 2023-03-20 ENCOUNTER — TELEPHONE (OUTPATIENT)
Dept: FAMILY MEDICINE | Facility: CLINIC | Age: 53
End: 2023-03-20

## 2023-03-20 NOTE — TELEPHONE ENCOUNTER
ISRAEL for call back - see below       Soco Acuña RN     Patient will need       Please inform patient his calcium is elevated , recommend to add additional fluid .  Will plan to recheck on his follow up .   Recommend to continue with diet and medication .   ( A1C already discussed at the visit )

## 2023-03-21 NOTE — TELEPHONE ENCOUNTER
Pt notified of lab, expressed understanding and acceptance of the plan. had no further questions at this time.  Advised can call back to clinic at any time with concerns.     Pt is requesting RF on BS testing supplies-  Sent to pharmacy he will check as directed.    Pt does not seem understand  how to do/collect FIT test.  Had lab staff explain collect process with [employer].   Pt may have difficulty collecting this.      Soco Acuña RN

## 2023-03-30 ENCOUNTER — APPOINTMENT (OUTPATIENT)
Dept: LAB | Facility: CLINIC | Age: 53
End: 2023-03-30

## 2023-03-30 LAB — HEMOCCULT STL QL IA: NEGATIVE

## 2023-03-30 PROCEDURE — 82274 ASSAY TEST FOR BLOOD FECAL: CPT | Performed by: FAMILY MEDICINE

## 2023-05-09 DIAGNOSIS — E11.9 TYPE 2 DIABETES MELLITUS WITHOUT COMPLICATION, WITHOUT LONG-TERM CURRENT USE OF INSULIN (H): ICD-10-CM

## 2023-06-08 NOTE — PROGRESS NOTES
Pre-Visit Planning   Next 5 appointments (look out 90 days)    Jun 13, 2023 11:30 AM  (Arrive by 11:10 AM)  Provider Visit with Taylor Fortune MD, VIDEO,   [facility] ([facility] ) [address] [address] [address] [phone]        Appointment Notes for this encounter:   diabetes follow up. For  over the phone please dial [phone] option 0 or use iPad.    Questionnaires Reviewed/Assigned No additional questionnaires are needed     {Unable to reach. Left voicemail. Advised patient to call clinic back at [phone] X [phone] with      Soco Acuña RN   .

## 2023-06-13 ENCOUNTER — OFFICE VISIT (OUTPATIENT)
Dept: FAMILY MEDICINE | Facility: CLINIC | Age: 53
End: 2023-06-13
Payer: COMMERCIAL

## 2023-06-13 VITALS
DIASTOLIC BLOOD PRESSURE: 84 MMHG | BODY MASS INDEX: 31.31 KG/M2 | RESPIRATION RATE: 18 BRPM | HEIGHT: 64 IN | SYSTOLIC BLOOD PRESSURE: 121 MMHG | HEART RATE: 81 BPM | OXYGEN SATURATION: 100 % | TEMPERATURE: 98.5 F | WEIGHT: 183.4 LBS

## 2023-06-13 DIAGNOSIS — E78.5 HYPERLIPIDEMIA LDL GOAL <100: ICD-10-CM

## 2023-06-13 DIAGNOSIS — R06.83 SNORING: ICD-10-CM

## 2023-06-13 DIAGNOSIS — R80.9 MICROALBUMINURIA: ICD-10-CM

## 2023-06-13 DIAGNOSIS — E11.8 TYPE 2 DIABETES MELLITUS WITH COMPLICATION, WITHOUT LONG-TERM CURRENT USE OF INSULIN (H): Primary | ICD-10-CM

## 2023-06-13 LAB — HBA1C MFR BLD: 8.7 % (ref 0–5.6)

## 2023-06-13 PROCEDURE — 83036 HEMOGLOBIN GLYCOSYLATED A1C: CPT | Performed by: FAMILY MEDICINE

## 2023-06-13 PROCEDURE — 99213 OFFICE O/P EST LOW 20 MIN: CPT | Performed by: FAMILY MEDICINE

## 2023-06-13 PROCEDURE — 36415 COLL VENOUS BLD VENIPUNCTURE: CPT | Performed by: FAMILY MEDICINE

## 2023-06-13 RX ORDER — ATORVASTATIN CALCIUM 40 MG/1
40 TABLET, FILM COATED ORAL DAILY
Qty: 90 TABLET | Refills: 1 | Status: SHIPPED | OUTPATIENT
Start: 2023-06-13 | End: 2023-12-12

## 2023-06-13 RX ORDER — LOSARTAN POTASSIUM 25 MG/1
25 TABLET ORAL DAILY
Qty: 90 TABLET | Refills: 1 | Status: SHIPPED | OUTPATIENT
Start: 2023-06-13 | End: 2023-12-12

## 2023-06-13 RX ORDER — GLIPIZIDE 10 MG/1
10 TABLET, FILM COATED, EXTENDED RELEASE ORAL DAILY
Qty: 90 TABLET | Refills: 1 | Status: SHIPPED | OUTPATIENT
Start: 2023-06-13 | End: 2023-12-12

## 2023-06-13 ASSESSMENT — PAIN SCALES - GENERAL: PAINLEVEL: NO PAIN (0)

## 2023-06-13 NOTE — PROGRESS NOTES
"  Assessment & Plan     Type 2 diabetes mellitus with complication, without long-term current use of insulin (H)  Discussed diet modification   Discussed healthy eating   Discussed importance of controlling sugar .  - Hemoglobin A1c  - Adult Eye  Referral; Future  - glipiZIDE (GLUCOTROL XL) 10 MG 24 hr tablet; Take 1 tablet (10 mg) by mouth daily  - metFORMIN (GLUCOPHAGE) 1000 MG tablet; Take 1 tablet (1,000 mg) by mouth 2 times daily (with meals)    Hyperlipidemia LDL goal <100    - atorvastatin (LIPITOR) 40 MG tablet; Take 1 tablet (40 mg) by mouth daily    Microalbuminuria    - losartan (COZAAR) 25 MG tablet; Take 1 tablet (25 mg) by mouth daily    Snoring    - Adult Sleep Eval & Management  Referral; Future         BMI:   Estimated body mass index is 31.98 kg/m  as calculated from the following:    Height as of this encounter: 1.613 m (5' 3.5\").    Weight as of this encounter: 83.2 kg (183 lb 6.4 oz).   Weight management plan: Discussed healthy diet and exercise guidelines      [facility]  [facility] 53 [facility], presenting for the following health issues:  Diabetes and Sleep Apnea        6/13/2023     9:51 AM   Additional Questions   Roomed by Addie DUMONT     History of Present Illness       Diabetes:   He presents for follow up of diabetes.  He is not checking blood glucose. He has no concerns regarding his diabetes at this time.  He is not experiencing numbness or burning in feet, excessive thirst, blurry vision, weight changes or redness, sores or blisters on feet. The patient has not had a diabetic eye exam in the last 12 months.         He eats 0-1 servings of fruits and vegetables daily.He consumes 0 sweetened beverage(s) daily.He exercises with enough effort to increase his heart rate 20 to 29 minutes per day.  He exercises with enough effort to increase his heart rate 3 or less days per week.   He is taking medications regularly.   " "        Review of Systems   HENT:        Snoring    Gastrointestinal: Negative for abdominal distention.   Musculoskeletal: Negative for arthralgias.   Neurological: Negative for headaches.   Psychiatric/Behavioral: Negative for agitation and behavioral problems.            Objective    /84 (BP Location: Right arm, Patient Position: Sitting, Cuff Size: Adult Large)   Pulse 81   Temp 98.5  F (36.9  C) (Oral)   Resp 18   Ht 1.613 m (5' 3.5\")   Wt 83.2 kg (183 lb 6.4 oz)   SpO2 100%   BMI 31.98 kg/m    Body mass index is 31.98 kg/m .  Physical Exam  Vitals reviewed.   Cardiovascular:      Rate and Rhythm: Normal rate.   Pulmonary:      Effort: Pulmonary effort is normal.   Abdominal:      General: Abdomen is flat.   Skin:     General: Skin is warm.   Neurological:      General: No focal deficit present.      Mental Status: He is alert.   Psychiatric:         Mood and Affect: Mood normal.                    "

## 2023-06-19 ASSESSMENT — ENCOUNTER SYMPTOMS
HEADACHES: 0
ARTHRALGIAS: 0
AGITATION: 0
ABDOMINAL DISTENTION: 0

## 2023-09-18 ENCOUNTER — TELEPHONE (OUTPATIENT)
Dept: FAMILY MEDICINE | Facility: CLINIC | Age: 53
End: 2023-09-18

## 2023-09-18 NOTE — TELEPHONE ENCOUNTER
Patient is requesting an alternate Diabetes Monitoring system- he would like to try the Wrist Watch to wear instead.  Is currently taking his blood sugars at least 1 time per day, would rather not have to poke his finger daily.     Is this something that you are willing to send in for him. His next Diabetes visit is in December.  Thanks.     Liz Siegel, CMA

## 2023-09-18 NOTE — TELEPHONE ENCOUNTER
Insurance will not cover CGM if patient not on insulin .  He can check with insurance .   Meter he got was the one covered with his insurance .    Taylor carmichael

## 2023-09-18 NOTE — TELEPHONE ENCOUNTER
Pt notified via Polish interrater, he can discuss in more depth at next OV with PCP    Pt expressed understanding and acceptance of the plan. had no further questions at this time.  Advised can call back to clinic at any time with concerns.      What pt is asking about is Smart Watch that also monitors BS.  [employer]     Soco Acuña RN

## 2023-10-02 DIAGNOSIS — E11.8 TYPE 2 DIABETES MELLITUS WITH COMPLICATION, WITHOUT LONG-TERM CURRENT USE OF INSULIN (H): ICD-10-CM

## 2023-10-03 RX ORDER — GLIPIZIDE 10 MG/1
10 TABLET, FILM COATED, EXTENDED RELEASE ORAL DAILY
Qty: 90 TABLET | Refills: 1 | OUTPATIENT
Start: 2023-10-03

## 2023-11-02 ENCOUNTER — TELEPHONE (OUTPATIENT)
Dept: FAMILY MEDICINE | Facility: CLINIC | Age: 53
End: 2023-11-02
Payer: COMMERCIAL

## 2023-12-12 ENCOUNTER — OFFICE VISIT (OUTPATIENT)
Dept: FAMILY MEDICINE | Facility: CLINIC | Age: 53
End: 2023-12-12
Payer: COMMERCIAL

## 2023-12-12 VITALS
SYSTOLIC BLOOD PRESSURE: 102 MMHG | OXYGEN SATURATION: 98 % | HEART RATE: 94 BPM | WEIGHT: 174 LBS | HEIGHT: 64 IN | TEMPERATURE: 99.4 F | DIASTOLIC BLOOD PRESSURE: 76 MMHG | BODY MASS INDEX: 29.71 KG/M2 | RESPIRATION RATE: 16 BRPM

## 2023-12-12 DIAGNOSIS — E11.65 TYPE 2 DIABETES MELLITUS WITH HYPERGLYCEMIA, WITHOUT LONG-TERM CURRENT USE OF INSULIN (H): Primary | ICD-10-CM

## 2023-12-12 DIAGNOSIS — R80.9 MICROALBUMINURIA: ICD-10-CM

## 2023-12-12 DIAGNOSIS — E78.5 HYPERLIPIDEMIA LDL GOAL <100: ICD-10-CM

## 2023-12-12 LAB
HBA1C MFR BLD: 8.8 % (ref 0–5.6)
HOLD SPECIMEN: NORMAL

## 2023-12-12 PROCEDURE — 80048 BASIC METABOLIC PNL TOTAL CA: CPT | Performed by: FAMILY MEDICINE

## 2023-12-12 PROCEDURE — 99213 OFFICE O/P EST LOW 20 MIN: CPT | Performed by: FAMILY MEDICINE

## 2023-12-12 PROCEDURE — 83036 HEMOGLOBIN GLYCOSYLATED A1C: CPT | Performed by: FAMILY MEDICINE

## 2023-12-12 PROCEDURE — 36415 COLL VENOUS BLD VENIPUNCTURE: CPT | Performed by: FAMILY MEDICINE

## 2023-12-12 RX ORDER — GLIPIZIDE 10 MG/1
10 TABLET, FILM COATED, EXTENDED RELEASE ORAL DAILY
Qty: 90 TABLET | Refills: 1 | Status: SHIPPED | OUTPATIENT
Start: 2023-12-12 | End: 2024-06-17

## 2023-12-12 RX ORDER — ATORVASTATIN CALCIUM 40 MG/1
40 TABLET, FILM COATED ORAL DAILY
Qty: 90 TABLET | Refills: 1 | Status: SHIPPED | OUTPATIENT
Start: 2023-12-12 | End: 2024-06-17

## 2023-12-12 RX ORDER — LOSARTAN POTASSIUM 25 MG/1
25 TABLET ORAL DAILY
Qty: 90 TABLET | Refills: 1 | Status: SHIPPED | OUTPATIENT
Start: 2023-12-12 | End: 2024-03-17

## 2023-12-12 ASSESSMENT — PAIN SCALES - GENERAL: PAINLEVEL: MODERATE PAIN (4)

## 2023-12-12 NOTE — LETTER
December 18, 2023      Foli RG Arcos  [address]  [place] [address]        Dear ,    We are writing to inform you of your test results.    Normal electrolyte panel .   A1c Above goal   Continue with low cholesterol and low sugar diet .     Resulted Orders   Hemoglobin A1c   Result Value Ref Range    Hemoglobin A1C 8.8 (H) 0.0 - 5.6 %      Comment:      Normal <5.7%   Prediabetes 5.7-6.4%    Diabetes 6.5% or higher     Note: Adopted from ADA consensus guidelines.   Basic metabolic panel  (Ca, Cl, CO2, Creat, Gluc, K, Na, BUN)   Result Value Ref Range    Sodium 140 135 - 145 mmol/L      Comment:      Reference intervals for this test were updated on 09/26/2023 to more accurately reflect our healthy population. There may be differences in the flagging of prior results with similar values performed with this method. Interpretation of those prior results can be made in the context of the updated reference intervals.     Potassium 4.1 3.4 - 5.3 mmol/L    Chloride 105 98 - 107 mmol/L    Carbon Dioxide (CO2) 26 22 - 29 mmol/L    Anion Gap 9 7 - 15 mmol/L    Urea Nitrogen 8.0 6.0 - 20.0 mg/dL    Creatinine 0.86 0.67 - 1.17 mg/dL    GFR Estimate >90 >60 mL/min/1.73m2    Calcium 9.3 8.6 - 10.0 mg/dL    Glucose 95 70 - 99 mg/dL       If you have any questions or concerns, please call the clinic at the number listed above.       Sincerely,      Taylor Fortune MD

## 2023-12-12 NOTE — PROGRESS NOTES
"  Assessment & Plan     Type 2 diabetes mellitus with hyperglycemia, without long-term current use of insulin (H)  Discussed adding Januvia .  Patient want to try diet modification prior to adding additional medication .   - Hemoglobin A1c  - Basic metabolic panel  (Ca, Cl, CO2, Creat, Gluc, K, Na, BUN); Future  - Basic metabolic panel  (Ca, Cl, CO2, Creat, Gluc, K, Na, BUN)    Microalbuminuria    - losartan (COZAAR) 25 MG tablet; Take 1 tablet (25 mg) by mouth daily    Hyperlipidemia LDL goal <100    - atorvastatin (LIPITOR) 40 MG tablet; Take 1 tablet (40 mg) by mouth daily       BMI:   Estimated body mass index is 30.34 kg/m  as calculated from the following:    Height as of this encounter: 1.613 m (5' 3.5\").    Weight as of this encounter: 78.9 kg (174 lb).   Weight management plan: Discussed healthy diet and exercise guidelines        Taylor Fortune MD  [facility] JAKE Green is a 53 year old, presenting for the following health issues:  Diabetes (Here today to follow up on his Diabetes.  Is looking into options to check his blood sugars, something like a watch style. )        12/12/2023     9:57 AM   Additional Questions   Roomed by Mayra Siegel CMA   Accompanied by Self       HPI     Medication Followup of Diabetes  Taking Medication as prescribed: yes  Side Effects:  None  Medication Helping Symptoms:  yes          Review of Systems   Constitutional, HEENT, cardiovascular, pulmonary, GI, , musculoskeletal, neuro, skin, endocrine and psych systems are negative, except as otherwise noted.      Objective    /76 (BP Location: Right arm, Patient Position: Sitting, Cuff Size: Adult Large)   Pulse 94   Temp 99.4  F (37.4  C) (Oral)   Resp 16   Ht 1.613 m (5' 3.5\")   Wt 78.9 kg (174 lb)   SpO2 98%   BMI 30.34 kg/m    Body mass index is 30.34 kg/m .  Physical Exam   GENERAL: healthy, alert and no distress    RESP: lungs clear to auscultation - no rales, rhonchi or " wheezes  CV: regular rate and rhythm, normal S1 S2, no S3 or S4, no murmur, click or rub, no peripheral edema and peripheral pulses strong  ABDOMEN: soft, nontender, no hepatosplenomegaly, no masses and bowel sounds normal  SKIN: no suspicious lesions or rashes  NEURO: Normal strength and tone, mentation intact

## 2023-12-13 LAB
ANION GAP SERPL CALCULATED.3IONS-SCNC: 9 MMOL/L (ref 7–15)
BUN SERPL-MCNC: 8 MG/DL (ref 6–20)
CALCIUM SERPL-MCNC: 9.3 MG/DL (ref 8.6–10)
CHLORIDE SERPL-SCNC: 105 MMOL/L (ref 98–107)
CREAT SERPL-MCNC: 0.86 MG/DL (ref 0.67–1.17)
DEPRECATED HCO3 PLAS-SCNC: 26 MMOL/L (ref 22–29)
EGFRCR SERPLBLD CKD-EPI 2021: >90 ML/MIN/1.73M2
GLUCOSE SERPL-MCNC: 95 MG/DL (ref 70–99)
POTASSIUM SERPL-SCNC: 4.1 MMOL/L (ref 3.4–5.3)
SODIUM SERPL-SCNC: 140 MMOL/L (ref 135–145)

## 2024-03-17 DIAGNOSIS — R80.9 MICROALBUMINURIA: ICD-10-CM

## 2024-03-18 RX ORDER — LOSARTAN POTASSIUM 25 MG/1
25 TABLET ORAL DAILY
Qty: 90 TABLET | Refills: 0 | Status: SHIPPED | OUTPATIENT
Start: 2024-03-18 | End: 2024-06-17

## 2024-05-31 ENCOUNTER — TELEPHONE (OUTPATIENT)
Dept: FAMILY MEDICINE | Facility: CLINIC | Age: 54
End: 2024-05-31
Payer: COMMERCIAL

## 2024-05-31 NOTE — TELEPHONE ENCOUNTER
Reason for Call:  Appointment Request    Patient requesting this type of appt:  Preventive     Requested provider: Taylor Fortune    Reason patient unable to be scheduled: Not within requested timeframe    When does patient want to be seen/preferred time: as soon as possible    Comments: pt was scheduled for his preventative adult on 6/10, unfortunately provider will be out and pt will need this scheduled before the end of the month for his work. His work is requesting for him to get a physical done and will need the records before 6/30. Clinic will have to call pt to reschedule appt.     Okay to leave a detailed message?: Yes at Cell number on file:    Telephone Information:   Mobile [phone]       Call taken on 5/31/2024 at 12:16 PM by Kimberly Burton

## 2024-05-31 NOTE — TELEPHONE ENCOUNTER
Please advise, I am guessing you are full, can probably see someone else for this     Camille Shin/

## 2024-06-03 NOTE — TELEPHONE ENCOUNTER
Patient calls back asking for help rescheduling appointment. Appointment rescheduled.    Appointments in Next Year      Jun 17, 2024 11:00 AM  (Arrive by 10:40 AM)  Adult Preventative Visit with Taylor Fortune MD  [facility] ([facility] ) [phone]          Thank you,  Sidney Montejo, Triage RN [facility]  12:00 PM 6/3/2024

## 2024-06-17 ENCOUNTER — OFFICE VISIT (OUTPATIENT)
Dept: FAMILY MEDICINE | Facility: CLINIC | Age: 54
End: 2024-06-17
Payer: COMMERCIAL

## 2024-06-17 VITALS
RESPIRATION RATE: 18 BRPM | HEART RATE: 71 BPM | DIASTOLIC BLOOD PRESSURE: 73 MMHG | OXYGEN SATURATION: 98 % | HEIGHT: 63 IN | SYSTOLIC BLOOD PRESSURE: 107 MMHG | BODY MASS INDEX: 31.18 KG/M2 | WEIGHT: 176 LBS | TEMPERATURE: 98.3 F

## 2024-06-17 DIAGNOSIS — E11.8 TYPE 2 DIABETES MELLITUS WITH COMPLICATION, WITHOUT LONG-TERM CURRENT USE OF INSULIN (H): ICD-10-CM

## 2024-06-17 DIAGNOSIS — Z00.00 WELL ADULT EXAM: Primary | ICD-10-CM

## 2024-06-17 DIAGNOSIS — Z12.5 SCREENING FOR PROSTATE CANCER: ICD-10-CM

## 2024-06-17 DIAGNOSIS — Z12.11 SCREEN FOR COLON CANCER: ICD-10-CM

## 2024-06-17 DIAGNOSIS — R80.9 MICROALBUMINURIA: ICD-10-CM

## 2024-06-17 DIAGNOSIS — E78.5 HYPERLIPIDEMIA LDL GOAL <100: ICD-10-CM

## 2024-06-17 LAB
CHOLEST SERPL-MCNC: 116 MG/DL
CREAT UR-MCNC: 90.5 MG/DL
FASTING STATUS PATIENT QL REPORTED: YES
HBA1C MFR BLD: 7.4 % (ref 0–5.6)
HDLC SERPL-MCNC: 45 MG/DL
LDLC SERPL CALC-MCNC: 58 MG/DL
MICROALBUMIN UR-MCNC: 32 MG/L
MICROALBUMIN/CREAT UR: 35.36 MG/G CR (ref 0–17)
NONHDLC SERPL-MCNC: 71 MG/DL
PSA SERPL DL<=0.01 NG/ML-MCNC: 0.45 NG/ML (ref 0–3.5)
TRIGL SERPL-MCNC: 66 MG/DL

## 2024-06-17 PROCEDURE — 36415 COLL VENOUS BLD VENIPUNCTURE: CPT | Performed by: FAMILY MEDICINE

## 2024-06-17 PROCEDURE — 99214 OFFICE O/P EST MOD 30 MIN: CPT | Mod: 25 | Performed by: FAMILY MEDICINE

## 2024-06-17 PROCEDURE — 83036 HEMOGLOBIN GLYCOSYLATED A1C: CPT | Performed by: FAMILY MEDICINE

## 2024-06-17 PROCEDURE — 82570 ASSAY OF URINE CREATININE: CPT | Performed by: FAMILY MEDICINE

## 2024-06-17 PROCEDURE — 99396 PREV VISIT EST AGE 40-64: CPT | Performed by: FAMILY MEDICINE

## 2024-06-17 PROCEDURE — 82043 UR ALBUMIN QUANTITATIVE: CPT | Performed by: FAMILY MEDICINE

## 2024-06-17 PROCEDURE — 80061 LIPID PANEL: CPT | Performed by: FAMILY MEDICINE

## 2024-06-17 PROCEDURE — G0103 PSA SCREENING: HCPCS | Performed by: FAMILY MEDICINE

## 2024-06-17 RX ORDER — GLIPIZIDE 10 MG/1
10 TABLET, FILM COATED, EXTENDED RELEASE ORAL DAILY
Qty: 90 TABLET | Refills: 3 | Status: SHIPPED | OUTPATIENT
Start: 2024-06-17

## 2024-06-17 RX ORDER — LOSARTAN POTASSIUM 25 MG/1
25 TABLET ORAL DAILY
Qty: 90 TABLET | Refills: 0 | Status: SHIPPED | OUTPATIENT
Start: 2024-06-17

## 2024-06-17 RX ORDER — ATORVASTATIN CALCIUM 40 MG/1
40 TABLET, FILM COATED ORAL DAILY
Qty: 90 TABLET | Refills: 3 | Status: SHIPPED | OUTPATIENT
Start: 2024-06-17

## 2024-06-17 SDOH — HEALTH STABILITY: PHYSICAL HEALTH: ON AVERAGE, HOW MANY MINUTES DO YOU ENGAGE IN EXERCISE AT THIS LEVEL?: 150+ MIN

## 2024-06-17 SDOH — HEALTH STABILITY: PHYSICAL HEALTH: ON AVERAGE, HOW MANY DAYS PER WEEK DO YOU ENGAGE IN MODERATE TO STRENUOUS EXERCISE (LIKE A BRISK WALK)?: 5 DAYS

## 2024-06-17 ASSESSMENT — SOCIAL DETERMINANTS OF HEALTH (SDOH): HOW OFTEN DO YOU GET TOGETHER WITH FRIENDS OR RELATIVES?: PATIENT DECLINED

## 2024-06-17 NOTE — PROGRESS NOTES
"Preventive Care Visit  [facility]  Taylor Fortune MD, Family Medicine  Jun 17, 2024      Assessment & Plan     (Z00.00) Well adult exam  (primary encounter diagnosis)  Comment: Discussed healthy eating   Discussed maintaining ideal weight   Discussed regular exercise   Discussed ways to loose weight   Discussed diet modification for diabetes .    (E11.8) Type 2 diabetes mellitus with complication, without long-term current use of insulin (H)  Comment:   Plan: Lipid panel reflex to direct LDL Non-fasting,         HEMOGLOBIN A1C, metFORMIN (GLUCOPHAGE) 1000 MG         tablet, glipiZIDE (GLUCOTROL XL) 10 MG 24 hr         tablet            (Z12.11) Screen for colon cancer  Comment:   Plan: Fecal colorectal cancer screen FIT - Future         (S+30)            (R80.9) Microalbuminuria  Comment:   Plan: losartan (COZAAR) 25 MG tablet            (E78.5) Hyperlipidemia LDL goal <100  Comment:   Plan: atorvastatin (LIPITOR) 40 MG tablet            (Z12.5) Screening for prostate cancer  Comment:   Plan: PSA, screen      BMI  Estimated body mass index is 30.93 kg/m  as calculated from the following:    Height as of this encounter: 1.607 m (5' 3.25\").    Weight as of this encounter: 79.8 kg (176 lb).   Weight management plan: Discussed healthy diet and exercise guidelines    Counseling  Appropriate preventive services were discussed with this patient, including applicable screening as appropriate for fall prevention, nutrition, physical activity, Tobacco-use cessation, weight loss and cognition.  Checklist reviewing preventive services available has been given to the patient.  Reviewed patient's diet, addressing concerns and/or questions.   The patient was instructed to see the dentist every 6 months.         Subjective   Foli is a 54 year old, presenting for the following:  Physical        6/17/2024    10:54 AM   Additional Questions   Roomed by [facility] Directive  Patient does not have a " Health Care Directive or Living Will:     HPI      Diabetes Follow-up    How often are you checking your blood sugar? A few times a week  What time of day are you checking your blood sugars (select all that apply)?  Before and after meals  Have you had any blood sugars above 200?  No  Have you had any blood sugars below 70?  No  What symptoms do you notice when your blood sugar is low?  None and Not applicable  What concerns do you have today about your diabetes? None   Do you have any of these symptoms? (Select all that apply)  No numbness or tingling in feet.  No redness, sores or blisters on feet.  No complaints of excessive thirst.  No reports of blurry vision.  No significant changes to weight.  Have you had a diabetic eye exam in the last 12 months? No        BP Readings from Last 2 Encounters:   06/17/24 107/73   12/12/23 102/76     Hemoglobin A1C (%)   Date Value   12/12/2023 8.8 (H)   06/13/2023 8.7 (H)   03/04/2021 10.1 (H)   08/20/2020 7.7 (H)     LDL Cholesterol Calculated (mg/dL)   Date Value   03/16/2023 74   12/14/2021 51   03/04/2021 59   08/20/2020 106 (H)           6/17/2024   General Health   How would you rate your overall physical health? Good   Feel stress (tense, anxious, or unable to sleep) Not at all         6/17/2024   Nutrition   Three or more servings of calcium each day? (!) NO   Diet: Regular (no restrictions)   How many servings of fruit and vegetables per day? (!) 0-1   How many sweetened beverages each day? 0-1         6/17/2024   Exercise   Days per week of moderate/strenous exercise 5 days   Average minutes spent exercising at this level 150+ min         6/17/2024   Social Factors   Frequency of gathering with friends or relatives Patient declined   Worry food won't last until get money to buy more No   Food not last or not have enough money for food? No   Do you have housing?  Yes   Are you worried about losing your housing? No   Lack of transportation? No   Unable to get  utilities (heat,electricity)? No         6/17/2024   Fall Risk   Fallen 2 or more times in the past year? No   Trouble with walking or balance? No          6/17/2024   Dental   Dentist two times every year? (!) NO         6/17/2024   TB Screening   Were you born outside of the [place]? Yes         Today's PHQ-2 Score:       6/17/2024    10:51 AM   PHQ-2 ( 1999 [employer])   Q1: Little interest or pleasure in doing things 0   Q2: Feeling down, depressed or hopeless 0   PHQ-2 Score 0   Q1: Little interest or pleasure in doing things Not at all   Q2: Feeling down, depressed or hopeless Not at all   PHQ-2 Score 0           6/17/2024   Substance Use   Alcohol more than 3/day or more than 7/wk Not Applicable   Do you use any other substances recreationally? No     Social History     Tobacco Use    Smoking status: Never    Smokeless tobacco: Never   Vaping Use    Vaping status: Never Used   Substance Use Topics    Alcohol use: Yes     Comment: Socially    Drug use: No           6/17/2024   STI Screening   New sexual partner(s) since last STI/HIV test? No   ASCVD Risk   The 10-year ASCVD risk score (Martín CONTRERAS, et al., 2019) is: 7.4%    Values used to calculate the score:      Age: 54 years      Sex: Male      Is Non- : Yes      Diabetic: Yes      Tobacco smoker: No      Systolic Blood Pressure: 107 mmHg      Is BP treated: No      HDL Cholesterol: 44 mg/dL      Total Cholesterol: 135 mg/dL         Reviewed and updated as needed this visit by Provider                    Past Medical History:   Diagnosis Date    Hyperlipidemia LDL goal <100 6/8/2019    Type 2 diabetes mellitus without complication, with long-term current use of insulin (H) 5/1/2018     No past surgical history on file.      Review of Systems  CONSTITUTIONAL: NEGATIVE for fever, chills, change in weight  INTEGUMENTARY/SKIN: NEGATIVE for worrisome rashes, moles or lesions  EYES: NEGATIVE for vision changes or irritation  ENT/MOUTH:  "NEGATIVE for ear, mouth and throat problems  RESP: NEGATIVE for significant cough or SOB  BREAST: NEGATIVE for masses, tenderness or discharge  CV: NEGATIVE for chest pain, palpitations or peripheral edema  GI: NEGATIVE for nausea, abdominal pain, heartburn, or change in bowel habits  : NEGATIVE for frequency, dysuria, or hematuria  MUSCULOSKELETAL: NEGATIVE for significant arthralgias or myalgia  NEURO: NEGATIVE for weakness, dizziness or paresthesias  ENDOCRINE: NEGATIVE for temperature intolerance, skin/hair changes  HEME: NEGATIVE for bleeding problems  PSYCHIATRIC: NEGATIVE for changes in mood or affect     Objective    Exam  /73   Pulse 71   Temp 98.3  F (36.8  C) (Tympanic)   Resp 18   Ht 1.607 m (5' 3.25\")   Wt 79.8 kg (176 lb)   SpO2 98%   BMI 30.93 kg/m     Estimated body mass index is 30.93 kg/m  as calculated from the following:    Height as of this encounter: 1.607 m (5' 3.25\").    Weight as of this encounter: 79.8 kg (176 lb).    Physical Exam  GENERAL: alert and no distress  EYES: Eyes grossly normal to inspection, PERRL and conjunctivae and sclerae normal  HENT: ear canals and TM's normal, nose and mouth without ulcers or lesions  NECK: no adenopathy, no asymmetry, masses, or scars  RESP: lungs clear to auscultation - no rales, rhonchi or wheezes  CV: regular rate and rhythm, normal S1 S2, no S3 or S4, no murmur, click or rub, no peripheral edema  ABDOMEN: soft, nontender, no hepatosplenomegaly, no masses and bowel sounds normal  MS: no gross musculoskeletal defects noted, no edema  SKIN: no suspicious lesions or rashes  NEURO: Normal strength and tone, mentation intact and speech normal  PSYCH: mentation appears normal, affect normal/bright  Foot exam- able to feel monofilament     Signed Electronically by: Taylor Fortune MD    "

## 2024-06-17 NOTE — LETTER
June 19, 2024      Norma Arcos  [address]  [facility] [address]        Dear ,    We are writing to inform you of your test results.    Blood tests in range .   A1c Improved continue with low cholesterol and low sugar diet and continue with current medication .     Resulted Orders   Lipid panel reflex to direct LDL Non-fasting   Result Value Ref Range    Cholesterol 116 <200 mg/dL    Triglycerides 66 <150 mg/dL    Direct Measure HDL 45 >=40 mg/dL    LDL Cholesterol Calculated 58 <=100 mg/dL    Non HDL Cholesterol 71 <130 mg/dL    Patient Fasting > 8hrs? Yes     Narrative    Cholesterol  Desirable:  <200 mg/dL    Triglycerides  Normal:  Less than 150 mg/dL  Borderline High:  150-199 mg/dL  High:  200-499 mg/dL  Very High:  Greater than or equal to 500 mg/dL    Direct Measure HDL  Female:  Greater than or equal to 50 mg/dL   Male:  Greater than or equal to 40 mg/dL    LDL Cholesterol  Desirable:  <100mg/dL  Above Desirable:  100-129 mg/dL   Borderline High:  130-159 mg/dL   High:  160-189 mg/dL   Very High:  >= 190 mg/dL    Non HDL Cholesterol  Desirable:  130 mg/dL  Above Desirable:  130-159 mg/dL  Borderline High:  160-189 mg/dL  High:  190-219 mg/dL  Very High:  Greater than or equal to 220 mg/dL   Albumin Random Urine Quantitative with Creat Ratio   Result Value Ref Range    Creatinine Urine mg/dL 90.5 mg/dL      Comment:      The reference ranges have not been established in urine creatinine. The results should be integrated into the clinical context for interpretation.    Albumin Urine mg/L 32.0 mg/L      Comment:      The reference ranges have not been established in urine albumin. The results should be integrated into the clinical context for interpretation.    Albumin Urine mg/g Cr 35.36 (H) 0.00 - 17.00 mg/g Cr      Comment:      Microalbuminuria is defined as an albumin:creatinine ratio of 17 to 299 for males and 25 to 299 for females. A ratio of albumin:creatinine of 300 or higher is  indicative of overt proteinuria.  Due to biologic variability, positive results should be confirmed by a second, first-morning random or 24-hour timed urine specimen. If there is discrepancy, a third specimen is recommended. When 2 out of 3 results are in the microalbuminuria range, this is evidence for incipient nephropathy and warrants increased efforts at glucose control, blood pressure control, and institution of therapy with an angiotensin-converting-enzyme (ACE) inhibitor (if the patient can tolerate it).     HEMOGLOBIN A1C   Result Value Ref Range    Hemoglobin A1C 7.4 (H) 0.0 - 5.6 %      Comment:      Normal <5.7%   Prediabetes 5.7-6.4%    Diabetes 6.5% or higher     Note: Adopted from ADA consensus guidelines.   PSA, screen   Result Value Ref Range    Prostate Specific Antigen Screen 0.45 0.00 - 3.50 ng/mL    Narrative    This result is obtained using the Roche Elecsys total PSA method on the mario [id number] immunoassay analyzer. Results obtained with different assay methods or kits cannot be used interchangeably.       If you have any questions or concerns, please call the clinic at the number listed above.       Sincerely,      Taylor Fortune MD

## 2024-10-23 ENCOUNTER — NURSE TRIAGE (OUTPATIENT)
Dept: FAMILY MEDICINE | Facility: CLINIC | Age: 54
End: 2024-10-23
Payer: COMMERCIAL

## 2024-10-23 NOTE — TELEPHONE ENCOUNTER
Agree need to go to ER .   If he continue to refuse to go to ER , we can offer same day appointment tomorrow 1 .

## 2024-10-23 NOTE — TELEPHONE ENCOUNTER
"Nurse SBAR Triage      Is this a 2nd Level Triage? NO    Situation: Pt transferred via red line for chest pain. Asked pt if he would like a Lithuanian , but he declined.    Background: Pt initially called PCP office for cardiology referral request this morning due to chest painsfor approx x2 months. Pt says he has had them on and off, but the last two days they have been \"really bad.\" Last night was \"Very bad\" and he says his wife was wanting him to go to the emergency room, but he said he wanted to wait to call his doctor in the morning. Pt stated that the pain last night was severe, radiating down his right arm and he felt \"very short of breath.\"    Assessment: Pt states he still feels moderate chest pain this morning. Says it comes and goes. Will sometimes last 10 mins at a time. Denies difficulty breathing this morning. Does not feel too weak to stand, but says he feels \"palpitations.\"    Recommendation: Advised pt to call 911 for EMS assistance with transport to the hospital. Pt declines calling 911. Advised pt that there is a high likelihood he is having or had a cardiac emergency last night and should be seen right away at a hospital. Pt states that it is almost time for him to go to work and he can go in tomorrow. Also concerned about the bill he will get.     Stressed to the patient that based on the severity of his symptoms, he should absolutely not wait until tomorrow and that this could be life-threatening. Also let him know he will not need to pay anything up front and the billing office can work with him if finances or insurance coverage are an issue, but to not let that be a barrier to getting care. Reviewed closest hospital location ([facility]) with patient. He verbalized understanding that he needs to be seen ASAP.     Routing to PCP and care team as FYI and to potentially follow up with patient later on.    Paulina Heaton, RN, BSN  [facility]       Reason for Disposition   Chest " pain lasting longer than 5 minutes and ANY of the following:         Pain is crushing, pressure-like, or heavy         Over 44 years old          Over 30 years old and one cardiac risk factor (e.g diabetes, high blood pressure, high cholesterol, smoker, or family history of heart disease)         History of heart disease (e.g. angina, heart attack, heart failure, bypass surgery, takes nitroglycerin)    Additional Information   Negative: SEVERE difficulty breathing (e.g., struggling for each breath, speaks in single words)   Negative: Difficult to awaken or acting confused (e.g., disoriented, slurred speech)   Negative: Shock suspected (e.g., cold/pale/clammy skin, too weak to stand, low BP, rapid pulse)   Negative: Passed out (i.e., lost consciousness, collapsed and was not responding)    Protocols used: Chest Pain-A-OH

## 2024-10-23 NOTE — TELEPHONE ENCOUNTER
Called to pt via Luxem[place]ish interp -- advised PCP reviewed message and advised to ER with chest pain.     [facility] in [place]- pt continues to decline ER    States he is doing a little better, did offer 1 pm tomorrow with arrival at  1240.   Advised with any worsening needs to go to ER today.  Pt expressed understanding and acceptance of the plan. had no further questions at this time.  Advised can call back to clinic at any time with concerns.      Soco Acuña RN

## 2024-10-24 ENCOUNTER — OFFICE VISIT (OUTPATIENT)
Dept: FAMILY MEDICINE | Facility: CLINIC | Age: 54
End: 2024-10-24
Payer: COMMERCIAL

## 2024-10-24 VITALS
RESPIRATION RATE: 14 BRPM | HEIGHT: 64 IN | OXYGEN SATURATION: 100 % | HEART RATE: 77 BPM | BODY MASS INDEX: 28.34 KG/M2 | SYSTOLIC BLOOD PRESSURE: 109 MMHG | DIASTOLIC BLOOD PRESSURE: 74 MMHG | TEMPERATURE: 98.5 F | WEIGHT: 166 LBS

## 2024-10-24 DIAGNOSIS — R10.11 ABDOMINAL PAIN, RIGHT UPPER QUADRANT: ICD-10-CM

## 2024-10-24 DIAGNOSIS — E78.5 HYPERLIPIDEMIA LDL GOAL <100: ICD-10-CM

## 2024-10-24 DIAGNOSIS — R80.9 MICROALBUMINURIA: ICD-10-CM

## 2024-10-24 DIAGNOSIS — E11.9 TYPE 2 DIABETES MELLITUS WITHOUT COMPLICATION, WITHOUT LONG-TERM CURRENT USE OF INSULIN (H): ICD-10-CM

## 2024-10-24 DIAGNOSIS — R00.0 TACHYCARDIA: ICD-10-CM

## 2024-10-24 DIAGNOSIS — Z12.11 SCREEN FOR COLON CANCER: Primary | ICD-10-CM

## 2024-10-24 LAB
ALBUMIN SERPL BCG-MCNC: 4.3 G/DL (ref 3.5–5.2)
ALP SERPL-CCNC: 77 U/L (ref 40–150)
ALT SERPL W P-5'-P-CCNC: 27 U/L (ref 0–70)
ANION GAP SERPL CALCULATED.3IONS-SCNC: 11 MMOL/L (ref 7–15)
AST SERPL W P-5'-P-CCNC: 24 U/L (ref 0–45)
BILIRUB SERPL-MCNC: 1.4 MG/DL
BUN SERPL-MCNC: 9.4 MG/DL (ref 6–20)
CALCIUM SERPL-MCNC: 10 MG/DL (ref 8.8–10.4)
CHLORIDE SERPL-SCNC: 104 MMOL/L (ref 98–107)
CREAT SERPL-MCNC: 0.86 MG/DL (ref 0.67–1.17)
EGFRCR SERPLBLD CKD-EPI 2021: >90 ML/MIN/1.73M2
ERYTHROCYTE [DISTWIDTH] IN BLOOD BY AUTOMATED COUNT: 12.1 % (ref 10–15)
EST. AVERAGE GLUCOSE BLD GHB EST-MCNC: 143 MG/DL
GLUCOSE SERPL-MCNC: 99 MG/DL (ref 70–99)
HBA1C MFR BLD: 6.6 % (ref 0–5.6)
HCO3 SERPL-SCNC: 24 MMOL/L (ref 22–29)
HCT VFR BLD AUTO: 41.1 % (ref 40–53)
HGB BLD-MCNC: 13.8 G/DL (ref 13.3–17.7)
MCH RBC QN AUTO: 29.6 PG (ref 26.5–33)
MCHC RBC AUTO-ENTMCNC: 33.6 G/DL (ref 31.5–36.5)
MCV RBC AUTO: 88 FL (ref 78–100)
PLATELET # BLD AUTO: 292 10E3/UL (ref 150–450)
POTASSIUM SERPL-SCNC: 3.9 MMOL/L (ref 3.4–5.3)
PROT SERPL-MCNC: 7.5 G/DL (ref 6.4–8.3)
RBC # BLD AUTO: 4.66 10E6/UL (ref 4.4–5.9)
SODIUM SERPL-SCNC: 139 MMOL/L (ref 135–145)
WBC # BLD AUTO: 5.9 10E3/UL (ref 4–11)

## 2024-10-24 PROCEDURE — 85027 COMPLETE CBC AUTOMATED: CPT | Performed by: FAMILY MEDICINE

## 2024-10-24 PROCEDURE — 36415 COLL VENOUS BLD VENIPUNCTURE: CPT | Performed by: FAMILY MEDICINE

## 2024-10-24 PROCEDURE — 80053 COMPREHEN METABOLIC PANEL: CPT | Performed by: FAMILY MEDICINE

## 2024-10-24 PROCEDURE — 83036 HEMOGLOBIN GLYCOSYLATED A1C: CPT | Performed by: FAMILY MEDICINE

## 2024-10-24 PROCEDURE — 99214 OFFICE O/P EST MOD 30 MIN: CPT | Performed by: FAMILY MEDICINE

## 2024-10-24 PROCEDURE — 93000 ELECTROCARDIOGRAM COMPLETE: CPT | Performed by: FAMILY MEDICINE

## 2024-10-24 RX ORDER — ATORVASTATIN CALCIUM 40 MG/1
40 TABLET, FILM COATED ORAL DAILY
Qty: 90 TABLET | Refills: 1 | Status: SHIPPED | OUTPATIENT
Start: 2024-10-24

## 2024-10-24 RX ORDER — GLIPIZIDE 10 MG/1
10 TABLET, FILM COATED, EXTENDED RELEASE ORAL DAILY
Qty: 90 TABLET | Refills: 1 | Status: SHIPPED | OUTPATIENT
Start: 2024-10-24

## 2024-10-24 RX ORDER — LOSARTAN POTASSIUM 25 MG/1
25 TABLET ORAL DAILY
Qty: 90 TABLET | Refills: 1 | Status: SHIPPED | OUTPATIENT
Start: 2024-10-24

## 2024-10-24 NOTE — PROGRESS NOTES
Assessment & Plan     (Z12.11) Screen for colon cancer  (primary encounter diagnosis)  Comment:   Plan: Fecal colorectal cancer screen FIT - Future         (S+30)            (R00.0) Tachycardia  Comment: episode of some palpitation and chest discomfort   EKG no acute changes .  Recommend to contact with any symptoms we can consider getting stress test .   Will monitor .   Plan: EKG 12-lead complete w/read - Clinics            (R10.11) Abdominal pain, right upper quadrant  Comment: abdominal exam normal , no tenderness .    Plan: CBC with platelets, Comprehensive metabolic         panel (BMP + Alb, Alk Phos, ALT, AST, Total.         Bili, TP)            (E11.9) Type 2 diabetes mellitus without complication, without long-term current use of insulin (H)  Comment:  Lab Results   Component Value Date    A1C 6.6 10/24/2024    A1C 7.4 06/17/2024    A1C 8.8 12/12/2023    A1C 8.7 06/13/2023    A1C 12.3 03/16/2023    A1C 10.1 03/04/2021    A1C 7.7 08/20/2020    A1C 6.5 06/06/2019    A1C 6.0 10/23/2018    A1C >15.0 03/26/2018       Plan: Hemoglobin A1c         Plan: glipiZIDE (GLUCOTROL XL) 10 MG 24 hr tablet,         metFORMIN (GLUCOPHAGE) 1000 MG tablet            (R80.9) Microalbuminuria  Comment:   Plan: losartan (COZAAR) 25 MG tablet            (E78.5) Hyperlipidemia LDL goal <100  Comment:   Plan: atorvastatin (LIPITOR) 40 MG tablet            Tushar Green is a 54 year old, presenting for the following health issues:  Chest Pain      10/24/2024    12:44 PM   Additional Questions   Roomed by Kojo Girard     HPI   Started a new job ,   Experiencing difficulty falling asleep .     2 days ago pain on the right sided  of upper abdomen .   Now resolved .  No pain with exercise or activity .     Chest Pain  Onset/Duration: 2 days  Description:   Location: right side and right rib area.  Character: hard   Radiation: into right rib area  Duration: 5 minutes   Intensity: severe  Progression of Symptoms:  "improving  Accompanying Signs & Symptoms:  Shortness of breath:none   Sweating: No  Nausea/vomiting: No  Lightheadedness: No  Palpitations: YES  Fever/Chills: No  Cough: No           Heartburn: No  History:   Family history of heart disease: No  Tobacco use: No  Previous similar symptoms: no   Precipitating factors:   Worse with exertion: No  Worse with deep breaths: YES           Related to eating: No           Better with burping: No  Alleviating factors: helps to do pushups  Therapies tried and outcome: clove spice not sure if helps.        Review of Systems  CONSTITUTIONAL: NEGATIVE for fever, chills, change in weight  INTEGUMENTARY/SKIN: NEGATIVE for worrisome rashes, moles or lesions  EYES: NEGATIVE for vision changes or irritation  ENT/MOUTH: NEGATIVE for ear, mouth and throat problems  RESP: NEGATIVE for significant cough or SOB  BREAST: NEGATIVE for masses, tenderness or discharge  CV: NEGATIVE for chest pain, palpitations or peripheral edema  GI: NEGATIVE for nausea, abdominal pain, heartburn, or change in bowel habits upper abdominal pain now resolved .  : NEGATIVE for frequency, dysuria, or hematuria  MUSCULOSKELETAL: NEGATIVE for significant arthralgias or myalgia  NEURO: NEGATIVE for weakness, dizziness or paresthesias  ENDOCRINE: NEGATIVE for temperature intolerance, skin/hair changes  HEME: NEGATIVE for bleeding problems  PSYCHIATRIC: NEGATIVE for changes in mood or affect      Objective    /78 (BP Location: Right arm, Patient Position: Chair, Cuff Size: Adult Large)   Pulse 77   Temp 98.5  F (36.9  C) (Oral)   Resp 14   Ht 1.613 m (5' 3.5\")   Wt 75.3 kg (166 lb)   SpO2 100%   BMI 28.94 kg/m    Body mass index is 28.94 kg/m .  Physical Exam   GENERAL: alert and no distress  EYES: Eyes grossly normal to inspection, PERRL and conjunctivae and sclerae normal  HENT: ear canals and TM's normal, nose and mouth without ulcers or lesions  NECK: no adenopathy, no asymmetry, masses, or " scars  RESP: lungs clear to auscultation - no rales, rhonchi or wheezes  CV: regular rate and rhythm, normal S1 S2, no S3 or S4, no murmur, click or rub, no peripheral edema  ABDOMEN: soft, nontender, no hepatosplenomegaly, no masses and bowel sounds normal  MS: no gross musculoskeletal defects noted, no edema  SKIN: no suspicious lesions or rashes  NEURO: Normal strength and tone, mentation intact and speech normal  PSYCH: mentation appears normal, affect normal/bright          Signed Electronically by: Taylor Fortune MD

## 2024-10-24 NOTE — LETTER
October 30, 2024      Foli A Isrrael  [address] [facility] [address]  [facility] [address]        Dear ,    We are writing to inform you of your test results.    Stool test return back negative .   Diabetes well controlled .     Resulted Orders   CBC with platelets   Result Value Ref Range    WBC Count 5.9 4.0 - 11.0 10e3/uL    RBC Count 4.66 4.40 - 5.90 10e6/uL    Hemoglobin 13.8 13.3 - 17.7 g/dL    Hematocrit 41.1 40.0 - 53.0 %    MCV 88 78 - 100 fL    MCH 29.6 26.5 - 33.0 pg    MCHC 33.6 31.5 - 36.5 g/dL    RDW 12.1 10.0 - 15.0 %    Platelet Count 292 150 - 450 10e3/uL   Comprehensive metabolic panel (BMP + Alb, Alk Phos, ALT, AST, Total. Bili, TP)   Result Value Ref Range    Sodium 139 135 - 145 mmol/L    Potassium 3.9 3.4 - 5.3 mmol/L    Carbon Dioxide (CO2) 24 22 - 29 mmol/L    Anion Gap 11 7 - 15 mmol/L    Urea Nitrogen 9.4 6.0 - 20.0 mg/dL    Creatinine 0.86 0.67 - 1.17 mg/dL    GFR Estimate >90 >60 mL/min/1.73m2      Comment:      eGFR calculated using 2021 CKD-EPI equation.    Calcium 10.0 8.8 - 10.4 mg/dL      Comment:      Reference intervals for this test were updated on 7/16/2024 to reflect our healthy population more accurately. There may be differences in the flagging of prior results with similar values performed with this method. Those prior results can be interpreted in the context of the updated reference intervals.    Chloride 104 98 - 107 mmol/L    Glucose 99 70 - 99 mg/dL    Alkaline Phosphatase 77 40 - 150 U/L    AST 24 0 - 45 U/L    ALT 27 0 - 70 U/L    Protein Total 7.5 6.4 - 8.3 g/dL    Albumin 4.3 3.5 - 5.2 g/dL    Bilirubin Total 1.4 (H) <=1.2 mg/dL   Hemoglobin A1c   Result Value Ref Range    Estimated Average Glucose 143 (H) <117 mg/dL    Hemoglobin A1C 6.6 (H) 0.0 - 5.6 %      Comment:      Normal <5.7%   Prediabetes 5.7-6.4%    Diabetes 6.5% or higher     Note: Adopted from ADA consensus guidelines.   Fecal colorectal cancer screen FIT - Future (S+30)   Result Value Ref Range     Occult Blood Screen FIT Negative Negative       If you have any questions or concerns, please call the clinic at the number listed above.       Sincerely,      Taylor Fortune MD

## 2024-10-24 NOTE — LETTER
October 28, 2024      Norma RG Arcos  [address] [facility] [address]  [place] [address]        Dear ,    We are writing to inform you of your test results.    Pleased to inform normal blood test .   Congratulations A1C much improved .       If you have any questions or concerns, please call the clinic at the number listed above.       Sincerely,        Camille Shin/

## 2024-10-29 PROCEDURE — 82274 ASSAY TEST FOR BLOOD FECAL: CPT | Performed by: FAMILY MEDICINE

## 2024-10-30 LAB — HEMOCCULT STL QL IA: NEGATIVE

## 2025-02-11 ENCOUNTER — TELEPHONE (OUTPATIENT)
Dept: FAMILY MEDICINE | Facility: CLINIC | Age: 55
End: 2025-02-11
Payer: COMMERCIAL

## 2025-02-11 NOTE — TELEPHONE ENCOUNTER
Patient Quality Outreach    Patient is due for the following:   Diabetes -  Eye Exam    Action(s) Taken:   Schedule a office visit for Eye exam    Type of outreach:    Sent letter.    Questions for provider review:               Lia Herron CMA

## 2025-02-13 ENCOUNTER — TELEPHONE (OUTPATIENT)
Dept: FAMILY MEDICINE | Facility: CLINIC | Age: 55
End: 2025-02-13
Payer: COMMERCIAL

## 2025-02-13 NOTE — TELEPHONE ENCOUNTER
Summary:    Patient is due/failing the following:   Eye exam    Reviewed:    [] CARE EVERYWHERE  [] LAST OV NOTE   [] FYI TAB  [] MYCHART ACTIVE?  [] LAST PANEL ENCOUNTER  [] FUTURE APPTS  [] IMMUNIZATIONS  [] Media Tab            Action needed:   Patient needs referral/order: eye exam    Type of outreach:    Phone, left message for patient to call back.                                                                                Day Raya/[facility]---[facility]

## 2025-04-22 ENCOUNTER — TELEPHONE (OUTPATIENT)
Dept: FAMILY MEDICINE | Facility: CLINIC | Age: 55
End: 2025-04-22
Payer: COMMERCIAL

## 2025-04-22 NOTE — TELEPHONE ENCOUNTER
Summary:    Patient is due/failing the following:   Eye exam    Reviewed:    [] CARE EVERYWHERE  [] LAST OV NOTE   [] FYI TAB  [] MYCHART ACTIVE?  [] LAST PANEL ENCOUNTER  [] FUTURE APPTS  [] IMMUNIZATIONS  [] Media Tab            Action needed:   Patient needs referral/order: eye exam    Type of outreach:    Sent MyChart message.                                                                               Day Raya/[facility]---[facility]       
Admission

## 2025-04-30 NOTE — LETTER
May 5, 2025      Norma Arcos  [address] [facility] [address]  [place] [place] [address]        Norma Arcos      We recently received a refill request for your METFORMIN 1000MG TABLETS .     Our records show you are due for a follow up visit with your provider.     Please call the clinic at [phone] to schedule as the providers are booking out.        Sincerely,        Taylor Fortune MD    Electronically signed

## 2025-06-26 ENCOUNTER — OFFICE VISIT (OUTPATIENT)
Dept: FAMILY MEDICINE | Facility: CLINIC | Age: 55
End: 2025-06-26
Payer: COMMERCIAL

## 2025-06-26 VITALS
SYSTOLIC BLOOD PRESSURE: 119 MMHG | DIASTOLIC BLOOD PRESSURE: 85 MMHG | TEMPERATURE: 98.8 F | OXYGEN SATURATION: 98 % | WEIGHT: 170 LBS | RESPIRATION RATE: 14 BRPM | HEIGHT: 64 IN | HEART RATE: 73 BPM | BODY MASS INDEX: 29.02 KG/M2

## 2025-06-26 DIAGNOSIS — E11.65 TYPE 2 DIABETES MELLITUS WITH HYPERGLYCEMIA, WITHOUT LONG-TERM CURRENT USE OF INSULIN (H): Primary | ICD-10-CM

## 2025-06-26 DIAGNOSIS — R80.9 MICROALBUMINURIA: ICD-10-CM

## 2025-06-26 DIAGNOSIS — E78.5 HYPERLIPIDEMIA LDL GOAL <100: ICD-10-CM

## 2025-06-26 LAB
ALBUMIN SERPL BCG-MCNC: 4.3 G/DL (ref 3.5–5.2)
ALP SERPL-CCNC: 72 U/L (ref 40–150)
ALT SERPL W P-5'-P-CCNC: 53 U/L (ref 0–70)
ANION GAP SERPL CALCULATED.3IONS-SCNC: 11 MMOL/L (ref 7–15)
AST SERPL W P-5'-P-CCNC: 38 U/L (ref 0–45)
BILIRUB SERPL-MCNC: 1 MG/DL
BUN SERPL-MCNC: 10.5 MG/DL (ref 6–20)
CALCIUM SERPL-MCNC: 9.6 MG/DL (ref 8.8–10.4)
CHLORIDE SERPL-SCNC: 104 MMOL/L (ref 98–107)
CHOLEST SERPL-MCNC: 136 MG/DL
CREAT SERPL-MCNC: 0.97 MG/DL (ref 0.67–1.17)
CREAT UR-MCNC: 128 MG/DL
EGFRCR SERPLBLD CKD-EPI 2021: >90 ML/MIN/1.73M2
EST. AVERAGE GLUCOSE BLD GHB EST-MCNC: 160 MG/DL
FASTING STATUS PATIENT QL REPORTED: ABNORMAL
FASTING STATUS PATIENT QL REPORTED: NORMAL
GLUCOSE SERPL-MCNC: 104 MG/DL (ref 70–99)
HBA1C MFR BLD: 7.2 % (ref 0–5.6)
HCO3 SERPL-SCNC: 25 MMOL/L (ref 22–29)
HDLC SERPL-MCNC: 52 MG/DL
LDLC SERPL CALC-MCNC: 69 MG/DL
MICROALBUMIN UR-MCNC: 60.1 MG/L
MICROALBUMIN/CREAT UR: 46.95 MG/G CR (ref 0–17)
NONHDLC SERPL-MCNC: 84 MG/DL
POTASSIUM SERPL-SCNC: 4.1 MMOL/L (ref 3.4–5.3)
PROT SERPL-MCNC: 7.5 G/DL (ref 6.4–8.3)
SODIUM SERPL-SCNC: 140 MMOL/L (ref 135–145)
TRIGL SERPL-MCNC: 73 MG/DL

## 2025-06-26 RX ORDER — GLIPIZIDE 10 MG/1
10 TABLET, FILM COATED, EXTENDED RELEASE ORAL DAILY
Qty: 90 TABLET | Refills: 1 | Status: SHIPPED | OUTPATIENT
Start: 2025-06-26

## 2025-06-26 RX ORDER — LOSARTAN POTASSIUM 25 MG/1
25 TABLET ORAL DAILY
Qty: 90 TABLET | Refills: 1 | Status: SHIPPED | OUTPATIENT
Start: 2025-06-26

## 2025-06-26 RX ORDER — ATORVASTATIN CALCIUM 40 MG/1
40 TABLET, FILM COATED ORAL DAILY
Qty: 90 TABLET | Refills: 1 | Status: SHIPPED | OUTPATIENT
Start: 2025-06-26

## 2025-06-26 RX ORDER — LANCETS
EACH MISCELLANEOUS
Qty: 100 EACH | Refills: 1 | Status: SHIPPED | OUTPATIENT
Start: 2025-06-26

## 2025-06-26 NOTE — PROGRESS NOTES
"  Assessment & Plan     (E11.65) Type 2 diabetes mellitus with hyperglycemia, without long-term current use of insulin (H)  (primary encounter diagnosis)  Comment:   Plan: Hemoglobin A1c, Comprehensive metabolic panel         (BMP + Alb, Alk Phos, ALT, AST, Total. Bili,         TP), Lipid panel reflex to direct LDL Fasting,         Albumin Random Urine Quantitative with Creat         Ratio, metFORMIN (GLUCOPHAGE) 1000 MG tablet,         glipiZIDE (GLUCOTROL XL) 10 MG 24 hr tablet,         FOOT EXAM            (E78.5) Hyperlipidemia LDL goal <100  Comment:   Plan: atorvastatin (LIPITOR) 40 MG tablet            (R80.9) Microalbuminuria  Comment:   Plan: losartan (COZAAR) 25 MG tablet                  BMI  Estimated body mass index is 29.64 kg/m  as calculated from the following:    Height as of this encounter: 1.613 m (5' 3.5\").    Weight as of this encounter: 77.1 kg (170 lb).   Weight management plan: Discussed healthy diet and exercise guidelines        Tushar Green is a 55 year old, presenting for the following health issues:  Diabetes (- A1c was drawn at today's visit)  History of Present Illness-  Foli RG Arcos, 55 years, male  - Previous high blood sugar levels, now improved  - Blood pressure previously high, now better  - Following a low carbohydrate, low sugar, and low salt diet  - Engaging in regular walking and healthier eating habits       6/26/2025    10:08 AM   Additional Questions   Roomed by CLAUDIO Jacobs   Accompanied by Self           Objective    /85 (BP Location: Right arm, Patient Position: Sitting, Cuff Size: Adult Large)   Pulse 73   Temp 98.8  F (37.1  C) (Oral)   Resp 14   Ht 1.613 m (5' 3.5\")   Wt 77.1 kg (170 lb)   SpO2 98%   BMI 29.64 kg/m    Body mass index is 29.64 kg/m .  Physical Exam   GENERAL: alert and no distress  EYES: Eyes grossly normal to inspection, PERRL and conjunctivae and sclerae normal  HENT: ear canals and TM's normal, nose and mouth without " ulcers or lesions  NECK: no adenopathy, no asymmetry, masses, or scars  RESP: lungs clear to auscultation - no rales, rhonchi or wheezes  CV: regular rate and rhythm, normal S1 S2, no S3 or S4, no murmur, click or rub, no peripheral edema  ABDOMEN: soft, nontender, no hepatosplenomegaly, no masses and bowel sounds normal  MS: no gross musculoskeletal defects noted, no edema  SKIN: no suspicious lesions or rashes  NEURO: Normal strength and tone, mentation intact and speech normal  PSYCH: mentation appears normal, affect normal/bright            Signed Electronically by: Taylor Fortune MD